# Patient Record
Sex: FEMALE | Race: WHITE | Employment: FULL TIME | URBAN - METROPOLITAN AREA
[De-identification: names, ages, dates, MRNs, and addresses within clinical notes are randomized per-mention and may not be internally consistent; named-entity substitution may affect disease eponyms.]

---

## 2017-01-17 ENCOUNTER — MYC MEDICAL ADVICE (OUTPATIENT)
Dept: FAMILY MEDICINE | Facility: CLINIC | Age: 47
End: 2017-01-17

## 2017-01-17 RX ORDER — ETHYNODIOL DIACETATE AND ETHINYL ESTRADIOL 1 MG-35MCG
1 KIT ORAL DAILY
Qty: 90 TABLET | Refills: 2 | Status: SHIPPED | OUTPATIENT
Start: 2017-01-17 | End: 2017-02-02

## 2017-01-17 NOTE — TELEPHONE ENCOUNTER
Zovia 1-35 E      Last Written Prescription Date:  11/27/2015  Last Fill Quantity: 90,   # refills: 3  Last Office Visit with [facility], [facility] or [facility] prescribing provider: 9/30/2016  Future Office visit:       Routing refill request to provider for review/approval because:  Drug not on the [facility], [facility] or [facility] refill protocol or controlled substance

## 2017-01-17 NOTE — Clinical Note
[address] [id number] [phone]      January 25, 2017      Coty Myles  [address] [facility] [address]              Dear Coty,    In order to ensure we are providing the best quality care, we have reviewed your chart and see that you are due for:    1 Physical with Pap  2 Diabetes Follow-up  3 Fasting cholesterol check    Please call the clinic at your earliest convenience to schedule an appointment.    Thank you for trusting us with your health care.    Sincerely,    Dr. Graciela Gates/jorge luis

## 2017-01-17 NOTE — TELEPHONE ENCOUNTER
Panel Management Review      Patient has the following on her problem list:     Diabetes    ASA:  Not on file    Last A1C  A1C     10.3   8/19/2016  A1C     10.3   4/27/2016  A1C      9.5   11/27/2015  A1C      9.4   8/28/2015  A1C      9.7   4/24/2015  A1C tested: Failed    Last LDL:    CHOL      136   11/27/2015  HDL       52   11/27/2015  LDL       44   11/27/2015  LDL       45   4/29/2014  TRIG      199   11/27/2015  CHOLHDLRATIO      2.1   8/28/2015  NHDL       84   11/27/2015    Is the patient on a Statin? YES             Is the patient on Aspirin? NO    Medications     HMG CoA Reductase Inhibitors    lovastatin (MEVACOR) 40 MG tablet          Last three blood pressure readings:  BP Readings from Last 3 Encounters:   09/30/16 146/76   08/19/16 120/78   04/27/16 128/80       Date of last diabetes office visit: 8/19/16     Tobacco History:     History   Smoking status     Never Smoker    Smokeless tobacco     Never Used             Composite cancer screening  Chart review shows that this patient is due/due soon for the following Pap Smear  Summary:    Patient is due/failing the following:   A1C, PAP and PHYSICAL    Action needed:   Patient needs office visit for Physical with Pap and Diabetes F/u.    Type of outreach:    Sent myQaa message.    Questions for provider review:    None                                                                                                                                      Nichelle Low MA       Chart routed to Care Team .

## 2017-01-23 DIAGNOSIS — E78.5 HYPERLIPIDEMIA LDL GOAL <100: Primary | ICD-10-CM

## 2017-01-23 RX ORDER — LOVASTATIN 40 MG
40 TABLET ORAL AT BEDTIME
Qty: 30 TABLET | Refills: 0 | Status: SHIPPED | OUTPATIENT
Start: 2017-01-23 | End: 2017-02-23

## 2017-01-23 NOTE — TELEPHONE ENCOUNTER
lovastatin (MEVACOR) 40 MG tablet     Last Written Prescription Date: 10-  Last Fill Quantity: 90, # refills: 0  Last Office Visit with [facility], [facility] or [facility] prescribing provider: 9-       CHOL      136   11/27/2015  HDL       52   11/27/2015  LDL       44   11/27/2015  LDL       45   4/29/2014  TRIG      199   11/27/2015  CHOLHDLRATIO      2.1   8/28/2015

## 2017-01-23 NOTE — TELEPHONE ENCOUNTER
Medication is being filled for 1 time refill only due to:  Patient needs to be seen because she is due for fasting labs and a diabetes check..     Raquel Doyle RN   January 23, 2017 11:23 AM  [facility] Triage   [phone]

## 2017-01-27 DIAGNOSIS — E11.9 TYPE 2 DIABETES MELLITUS (H): Primary | ICD-10-CM

## 2017-01-27 RX ORDER — INSULIN LISPRO 100 [IU]/ML
INJECTION, SUSPENSION SUBCUTANEOUS
Qty: 15 ML | Refills: 1 | Status: SHIPPED | OUTPATIENT
Start: 2017-01-27 | End: 2017-03-18

## 2017-01-27 NOTE — TELEPHONE ENCOUNTER
insulin pen needle (BD FRANCOISE U/F) 32G X 4 MM      Last Written Prescription Date: 12-  Last Fill Quantity: 100,  # refills: prn   Last Office Visit with [facility], [facility] or  Health prescribing provider: 9-                                                 insulin lispro prot & lispro (HUMALOG MIX 75/25 KWIKPEN) 100 UNITS/ML susp         Last Written Prescription Date: 8-  Last Fill Quantity: 3 mos, # refills: 1  Last Office Visit with [facility], [facility] or  Health prescribing provider:  9-        BP Readings from Last 3 Encounters:   09/30/16 146/76   08/19/16 120/78   04/27/16 128/80     MICROL        6   1/24/2014  No results found for this basename: microalbumin  CREATININE   Date Value Ref Range Status   08/19/2016 0.86 0.52 - 1.04 mg/dL Final   ]  GFR ESTIMATE   Date Value Ref Range Status   08/19/2016 70 >60 mL/min/1.7m2 Final     Comment:     Non  GFR Calc   11/27/2015 77 >60 mL/min/1.7m2 Final     Comment:     Non  GFR Calc   04/24/2015 77 >60 mL/min/1.7m2 Final     Comment:     Non  GFR Calc     GFR ESTIMATE IF BLACK   Date Value Ref Range Status   08/19/2016 85 >60 mL/min/1.7m2 Final     Comment:      GFR Calc   11/27/2015 >90   GFR Calc   >60 mL/min/1.7m2 Final   04/24/2015 >90   GFR Calc   >60 mL/min/1.7m2 Final     CHOL      136   11/27/2015  HDL       52   11/27/2015  LDL       44   11/27/2015  LDL       45   4/29/2014  TRIG      199   11/27/2015  CHOLHDLRATIO      2.1   8/28/2015  AST       34   1/31/2013  ALT       33   1/31/2013  A1C     10.3   8/19/2016  A1C     10.3   4/27/2016  A1C      9.5   11/27/2015  A1C      9.4   8/28/2015  A1C      9.7   4/24/2015  POTASSIUM   Date Value Ref Range Status   08/19/2016 4.1 3.4 - 5.3 mmol/L Final

## 2017-02-02 ENCOUNTER — OFFICE VISIT (OUTPATIENT)
Dept: INTERNAL MEDICINE | Facility: CLINIC | Age: 47
End: 2017-02-02
Payer: COMMERCIAL

## 2017-02-02 VITALS
OXYGEN SATURATION: 98 % | TEMPERATURE: 96.6 F | DIASTOLIC BLOOD PRESSURE: 84 MMHG | BODY MASS INDEX: 40.23 KG/M2 | SYSTOLIC BLOOD PRESSURE: 133 MMHG | HEART RATE: 72 BPM | WEIGHT: 253 LBS

## 2017-02-02 DIAGNOSIS — J45.21 INTERMITTENT ASTHMA, WITH ACUTE EXACERBATION: Primary | Chronic | ICD-10-CM

## 2017-02-02 DIAGNOSIS — R05.9 COUGH: ICD-10-CM

## 2017-02-02 DIAGNOSIS — E66.01 MORBID OBESITY, UNSPECIFIED OBESITY TYPE (H): Chronic | ICD-10-CM

## 2017-02-02 DIAGNOSIS — J22 LOWER RESPIRATORY INFECTION: ICD-10-CM

## 2017-02-02 PROCEDURE — 99214 OFFICE O/P EST MOD 30 MIN: CPT | Performed by: NURSE PRACTITIONER

## 2017-02-02 RX ORDER — AZITHROMYCIN 250 MG/1
TABLET, FILM COATED ORAL
Qty: 6 TABLET | Refills: 0 | Status: SHIPPED
Start: 2017-02-02 | End: 2017-02-27

## 2017-02-02 RX ORDER — CODEINE PHOSPHATE AND GUAIFENESIN 10; 100 MG/5ML; MG/5ML
1 SOLUTION ORAL EVERY 4 HOURS PRN
Qty: 120 ML | Refills: 0 | Status: SHIPPED | OUTPATIENT
Start: 2017-02-02 | End: 2017-02-27

## 2017-02-02 RX ORDER — GUAIFENESIN 600 MG/1
600 TABLET, EXTENDED RELEASE ORAL 2 TIMES DAILY PRN
Qty: 20 TABLET | Refills: 0 | Status: SHIPPED
Start: 2017-02-02 | End: 2017-02-27

## 2017-02-02 NOTE — MR AVS SNAPSHOT
After Visit Summary   2/2/2017    Coty Myles    MRN: [id number]           Patient Information     Date Of Birth          1970        Visit Information        Provider Department      2/2/2017 1:40 PM Anamaria Thomas APRN CNP [facility]        Today's Diagnoses     Intermittent asthma, with acute exacerbation    -  1     Morbid obesity, unspecified obesity type (H)         Uncontrolled type 2 diabetes mellitus with complication, with long-term current use of insulin (H)         Cough           Care Instructions    Start taking 300 mg Invokana daily. You can use up the rest of your current supply by taking 3, 100 mg tablets daily  Follow up diabetes in months          Follow-ups after your visit        Who to contact     If you have questions or need follow up information about today's clinic visit or your schedule please contact [facility] directly at [phone].  Normal or non-critical lab and imaging results will be communicated to you by OrangeScapehart, letter or phone within 4 business days after the clinic has received the results. If you do not hear from us within 7 days, please contact the clinic through OrangeScapehart or phone. If you have a critical or abnormal lab result, we will notify you by phone as soon as possible.  Submit refill requests through PaintZen or call your pharmacy and they will forward the refill request to us. Please allow 3 business days for your refill to be completed.          Additional Information About Your Visit        MyChart Information     [employer]Zen gives you secure access to your electronic health record. If you see a primary care provider, you can also send messages to your care team and make appointments. If you have questions, please call your primary care clinic.  If you do not have a primary care provider, please call [phone] and they will assist you.        Care EveryWhere ID     This is your Care EveryWhere ID. This  could be used by other organizations to access your [facility] medical records  [id number]        Your Vitals Were     Pulse Temperature Pulse Oximetry Last Period          72 96.6  F (35.9  C) (Oral) 98% 06/30/2016         Blood Pressure from Last 3 Encounters:   02/02/17 133/84   09/30/16 146/76   08/19/16 120/78    Weight from Last 3 Encounters:   02/02/17 253 lb (114.76 kg)   09/30/16 258 lb 8 oz (117.255 kg)   08/19/16 253 lb (114.76 kg)              Today, you had the following     No orders found for display         Today's Medication Changes          These changes are accurate as of: 2/2/17  2:09 PM.  If you have any questions, ask your nurse or doctor.               Start taking these medicines.        Dose/Directions    azithromycin 250 MG tablet   Commonly known as:  ZITHROMAX   Used for:  Intermittent asthma, with acute exacerbation   Started by:  Anamaria Thomas APRN CNP        Two tablets first day, then one tablet daily for four days.   Quantity:  6 tablet   Refills:  0       guaiFENesin 600 MG 12 hr tablet   Commonly known as:  MUCINEX   Used for:  Intermittent asthma, with acute exacerbation   Started by:  Anamaria Thomas APRN CNP        Dose:  600 mg   Take 1 tablet (600 mg) by mouth 2 times daily as needed for congestion   Quantity:  20 tablet   Refills:  0       guaiFENesin-codeine 100-10 MG/5ML Soln solution   Commonly known as:  ROBITUSSIN AC   Used for:  Cough   Started by:  Anamaria Thomas APRN CNP        Dose:  1 tsp.   Take 5 mLs by mouth every 4 hours as needed for cough   Quantity:  120 mL   Refills:  0         These medicines have changed or have updated prescriptions.        Dose/Directions    * INVOKANA 100 MG tablet   This may have changed:  Another medication with the same name was added. Make sure you understand how and when to take each.   Generic drug:  canagliflozin   Changed by:  Graciela Gates DO        Dose:  100 mg   Take 100 mg by mouth   Refills:  3       *  canagliflozin 300 MG tablet   Commonly known as:  INVOKANA   This may have changed:  You were already taking a medication with the same name, and this prescription was added. Make sure you understand how and when to take each.   Used for:  Uncontrolled type 2 diabetes mellitus with complication, with long-term current use of insulin (H)   Changed by:  Anamaria Thomas APRN CNP        Dose:  300 mg   Take 1 tablet (300 mg) by mouth every morning (before breakfast)   Quantity:  90 tablet   Refills:  0       * Notice:  This list has 2 medication(s) that are the same as other medications prescribed for you. Read the directions carefully, and ask your doctor or other care provider to review them with you.         Where to get your medicines      These medications were sent to [id number] - [place], [place] - [address] [place] [address]  [address] [facility] [address]     Phone:  [phone]    - azithromycin 250 MG tablet  - canagliflozin 300 MG tablet  - guaiFENesin 600 MG 12 hr tablet      Some of these will need a paper prescription and others can be bought over the counter.  Ask your nurse if you have questions.     Bring a paper prescription for each of these medications    - guaiFENesin-codeine 100-10 MG/5ML Soln solution             Primary Care Provider Office Phone # Fax #    Graciela Ceja Kody,  [phone]       [address] [address]        Thank you!     Thank you for choosing [facility]  for your care. Our goal is always to provide you with excellent care. Hearing back from our patients is one way we can continue to improve our services. Please take a few minutes to complete the written survey that you may receive in the mail after your visit with us. Thank you!             Your Updated Medication List - Protect others around you: Learn how to safely use, store and throw away your medicines at  www.disposemymeds.org.          This list is accurate as of: 2/2/17  2:09 PM.  Always use your most recent med list.                   Brand Name Dispense Instructions for use    albuterol 108 (90 BASE) MCG/ACT Inhaler    albuterol    1 Inhaler    Inhale 2 puffs into the lungs every 6 hours       azithromycin 250 MG tablet    ZITHROMAX    6 tablet    Two tablets first day, then one tablet daily for four days.       blood glucose monitoring lancets     1 Box    Testing twice daily.       blood glucose monitoring test strip    no brand specified    1 Month    2-3 times daily testing       cyclobenzaprine 10 MG tablet    FLEXERIL    30 tablet    Take 0.5-1 tablets (5-10 mg) by mouth 3 times daily as needed for muscle spasms       fluticasone 110 MCG/ACT Inhaler    FLOVENT HFA    3 Inhaler    Inhale 2 puffs into the lungs 2 times daily       guaiFENesin 600 MG 12 hr tablet    MUCINEX    20 tablet    Take 1 tablet (600 mg) by mouth 2 times daily as needed for congestion       guaiFENesin-codeine 100-10 MG/5ML Soln solution    ROBITUSSIN AC    120 mL    Take 5 mLs by mouth every 4 hours as needed for cough       HYDROcodone-acetaminophen 5-325 MG per tablet    NORCO    40 tablet    Take 1 tablet by mouth daily as needed for pain       insulin lispro prot & lispro injection    HumaLOG MIX 75/25 KWIKPEN    15 mL    Use twice daily. 48 units.       insulin pen needle 32G X 4 MM    BD FRANCOISE U/F    100 each    Use twice daily.       * INVOKANA 100 MG tablet   Generic drug:  canagliflozin      Take 100 mg by mouth       * canagliflozin 300 MG tablet    INVOKANA    90 tablet    Take 1 tablet (300 mg) by mouth every morning (before breakfast)       levothyroxine 88 MCG tablet    SYNTHROID    90 tablet    Take 1 tablet (88 mcg) by mouth daily       LEXAPRO PO      Take 40 mg by mouth daily.       lisinopril-hydrochlorothiazide 10-12.5 MG per tablet    PRINZIDE/ZESTORETIC    180 tablet    Take 2 tablets by mouth daily        LORazepam 0.5 MG tablet    ATIVAN     0.5 mg as needed       lovastatin 40 MG tablet    MEVACOR    30 tablet    Take 1 tablet (40 mg) by mouth At Bedtime **Due for office visit for further refills**       LUNESTA 3 MG tablet   Generic drug:  eszopiclone     0    1 TABLET AT BEDTIME PRN       metFORMIN 500 MG tablet    GLUCOPHAGE    450 tablet    Take 3 tablets in the am and 2 in the pm.       metoprolol 100 MG tablet    LOPRESSOR    180 tablet    Take 1 tablet (100 mg) by mouth 2 times daily       Multi-vitamin Tabs tablet   Generic drug:  multivitamin, therapeutic with minerals     0    1 TAB PO QD       naproxen sodium 275 MG tablet    ANAPROX    60 tablet    Take 1 tablet (275 mg) by mouth 2 times daily as needed       olopatadine 0.1 % ophthalmic solution    PATANOL    5 mL    Place 1 drop into both eyes 2 times daily       UNKNOWN MED DOSAGE     0    FOLIC ACID- 1 TABLET DAILY       vitamin D 1000 UNITS capsule      1 CAPSULE DAILY       * Notice:  This list has 2 medication(s) that are the same as other medications prescribed for you. Read the directions carefully, and ask your doctor or other care provider to review them with you.

## 2017-02-02 NOTE — PATIENT INSTRUCTIONS
Start taking 300 mg Invokana daily. You can use up the rest of your current supply by taking 3, 100 mg tablets daily  Follow up diabetes in months

## 2017-02-02 NOTE — PROGRESS NOTES
SUBJECTIVE:                                                    Coty Myles is a 46 year old female who presents to clinic today for the following health issues:      ENT Symptoms             Symptoms: cc Present Absent Comment   Fever/Chills  x  Fever- but hasnt checked    Fatigue  x     Muscle Aches  x     Eye Irritation  x  More watery    Sneezing   x    Nasal Luciano/Drg  x     Sinus Pressure/Pain  x  More chest congestion then sinus    Loss of smell   x    Dental pain  x  A week before this had happened    Sore Throat  x  Not severe    Swollen Glands   x    Ear Pain/Fullness  x  Left- more itchy then usual    Cough  x  Not sleeping at night because of cough    Wheeze  x     Chest Pain  x  When she coughs    Shortness of breath  x  More frequent then usual- going up the stairs   Rash   x    Other  x  Propping head up at night to sleep     Symptom duration:  1 week    Symptom severity:  moderate    Treatments tried:  Nyquil calms it down enough to sleep   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  Additional history: none    Patient Active Problem List   Diagnosis     Attention deficit disorder     Sciatica     Hypothyroidism     iamLUMBOSACRAL NEURITIS NOS     Hyperlipidemia LDL goal <100     Hypertension goal BP (blood pressure) < 130/80     Moderate major depression (H)     Family history of ischemic heart disease     Intermittent asthma     ASCUS with positive high risk HPV     Morbid obesity (H)     Headache     Uncontrolled type 2 diabetes mellitus with  complication (H)     Past Surgical History   Procedure Laterality Date     Surgical history of -   1990     right knee arthroscopic     Surgical history of -   2000     lump on neck removed     Ent surgery       Orthopedic surgery         Social History   Substance Use Topics     Smoking status: Never Smoker      Smokeless tobacco: Never Used     Alcohol Use: Yes      Comment: 3 to 4 per year     Family History   Problem Relation Age of Onset     HEART DISEASE Father      heart attack at 64     DIABETES Father      DIABETES Paternal Grandmother      DIABETES Mother      CANCER Paternal Grandfather      Lung CA     DIABETES Paternal Aunt      DIABETES Paternal Uncle      Alcohol/Drug Paternal Uncle      Gynecology Mother      hysterectomy- ovarian cyst     Respiratory Mother      COPD- dependent on oxygen, passed away at age 69     DIABETES Maternal Grandmother      DIABETES Brother      Allergies Mother      to percocet     CEREBROVASCULAR DISEASE No family hx of      C.A.D. Father          Current Outpatient Prescriptions   Medication Sig Dispense Refill     azithromycin (ZITHROMAX) 250 MG tablet Two tablets first day, then one tablet daily for four days. 6 tablet 0     guaiFENesin-codeine (ROBITUSSIN AC) 100-10 MG/5ML SOLN solution Take 5 mLs by mouth every 4 hours as needed for cough 120 mL 0     guaiFENesin (MUCINEX) 600 MG 12 hr tablet Take 1 tablet (600 mg) by mouth 2 times daily as needed for congestion 20 tablet 0     canagliflozin (INVOKANA) 300 MG tablet Take 1 tablet (300 mg) by mouth every morning (before breakfast) 90 tablet 0     insulin pen needle (BD FRANCOISE U/F) 32G X 4 MM Use twice daily. 100 each prn     insulin lispro prot & lispro (HUMALOG MIX 75/25 KWIKPEN) injection Use twice daily. 48 units. 15 mL 1     lovastatin (MEVACOR) 40 MG tablet Take 1 tablet (40 mg) by mouth At Bedtime **Due for office visit for further refills** 30 tablet 0     blood glucose monitoring (NO BRAND SPECIFIED) test strip  2-3 times daily testing 1 Month 12     metoprolol (LOPRESSOR) 100 MG tablet Take 1 tablet (100 mg) by mouth 2 times daily 180 tablet 0     INVOKANA 100 MG tablet Take 100 mg by mouth  3     LORazepam (ATIVAN) 0.5 MG tablet 0.5 mg as needed  2     metFORMIN (GLUCOPHAGE) 500 MG tablet Take 3 tablets in the am and 2 in the pm. 450 tablet 1     olopatadine (PATANOL) 0.1 % ophthalmic solution Place 1 drop into both eyes 2 times daily 5 mL 3     HYDROcodone-acetaminophen (NORCO) 5-325 MG per tablet Take 1 tablet by mouth daily as needed for pain 40 tablet 0     lisinopril-hydrochlorothiazide (PRINZIDE,ZESTORETIC) 10-12.5 MG per tablet Take 2 tablets by mouth daily 180 tablet 3     levothyroxine (SYNTHROID) 88 MCG tablet Take 1 tablet (88 mcg) by mouth daily 90 tablet 1     blood glucose monitoring (ACCU-CHEK MULTICLIX) lancets Testing twice daily. 1 Box 12     fluticasone (FLOVENT HFA) 110 MCG/ACT inhaler Inhale 2 puffs into the lungs 2 times daily 3 Inhaler 1     albuterol (ALBUTEROL) 108 (90 BASE) MCG/ACT inhaler Inhale 2 puffs into the lungs every 6 hours 1 Inhaler 0     naproxen sodium (ANAPROX) 275 MG tablet Take 1 tablet (275 mg) by mouth 2 times daily as needed 60 tablet 1     cyclobenzaprine (FLEXERIL) 10 MG tablet Take 0.5-1 tablets (5-10 mg) by mouth 3 times daily as needed for muscle spasms 30 tablet 1     Escitalopram Oxalate (LEXAPRO PO) Take 40 mg by mouth daily.       VITAMIN D 1000 UNIT PO CAPS 1 CAPSULE DAILY       LUNESTA 3 MG OR TABS 1 TABLET AT BEDTIME PRN 0 0     UNKNOWN MED DOSAGE FOLIC ACID- 1 TABLET DAILY 0 0     MULTI-VITAMIN OR TABS 1 TAB PO QD 0 0     Recent Labs   Lab Test  08/19/16   0829  04/27/16   1634  11/27/15   1037  08/28/15   0859   04/29/14   1350   05/09/13   1111  01/31/13   1812   05/15/12   0809   08/09/11   0939   A1C  10.3*  10.3*  9.5*  9.4*   < >  8.0*   < >  9.3*   --    < >  11.3*   < >   --    LDL   --    --   44  28   --   45   --   43   --    --   84   --   63   HDL   --     --   52  53   --    --    --   48*   --    --   49*   --   48*   TRIG   --    --   199*  159*   --    --    --   210*   --    --   241*   --   193*   ALT   --    --    --    --    --    --    --    --   33   --   18   --   19   CR  0.86   --   0.80   --    < >  0.89   < >   --   0.85   < >  0.80   < >   --    GFRESTIMATED  70   --   77   --    < >  68   < >   --   73   < >  78   < >   --    GFRESTBLACK  85   --   >90   GFR Calc     --    < >  83   < >   --   88   < >  >90   < >   --    POTASSIUM  4.1   --   4.4   --    < >  3.7   < >   --   4.5   < >  4.3   < >   --    TSH   --    --   3.30  4.76*   < >  1.35   --   2.07   --    --   3.81   --    --     < > = values in this interval not displayed.      BP Readings from Last 3 Encounters:   02/02/17 133/84   09/30/16 146/76   08/19/16 120/78    Wt Readings from Last 3 Encounters:   02/02/17 253 lb (114.76 kg)   09/30/16 258 lb 8 oz (117.255 kg)   08/19/16 253 lb (114.76 kg)                  Problem list, Medication list, Allergies, and Medical/Social/Surgical histories reviewed in [facility] and updated as appropriate.    ROS:  Constitutional, HEENT, cardiovascular, pulmonary, gi and gu systems are negative, except as otherwise noted.    OBJECTIVE:                                                    /84 mmHg  Pulse 72  Temp(Src) 96.6  F (35.9  C) (Oral)  Wt 253 lb (114.76 kg)  SpO2 98%  LMP 06/30/2016  Body mass index is 40.23 kg/(m^2).  GENERAL: healthy, alert and no distress  HENT: ear canals and TM's normal, nose and mouth without ulcers or lesions  NECK: no adenopathy  RESP: scattered wheezing, rhonchi, no labored breathing  CV: regular rate and rhythm, normal S1 S2, no S3 or S4, no murmur, click or rub    Diagnostic Test Results:  none      ASSESSMENT/PLAN:                                                        ICD-10-CM    1. Intermittent asthma, with acute exacerbation J45.21 azithromycin (ZITHROMAX) 250 MG tablet     guaiFENesin  (MUCINEX) 600 MG 12 hr tablet   2. Morbid obesity, unspecified obesity type (H) E66.01    3. Uncontrolled type 2 diabetes mellitus with complication, with long-term current use of insulin (H) E11.8 canagliflozin (INVOKANA) 300 MG tablet    E11.65     Z79.4    4. Cough R05 guaiFENesin-codeine (ROBITUSSIN AC) 100-10 MG/5ML SOLN solution   5. Lower respiratory infection J22      Recommend Abx treatment for respiratory infection > 10 weeks during in patient with history of asthma  Due to poorly controlled diabetes, can not do steorid burst  Increase Invokana  Follow up in 3 months  Patient requests refill of Norco at the end of visit. I advised her to get this from her primary care provider. I do see she is getting serial Norco scripts for headache. I recommended to her that she establish a pain contract with one provider.  Patient do for several health maintenance items. She will call to schedule   Patient Instructions   Start taking 300 mg Invokana daily. You can use up the rest of your current supply by taking 3, 100 mg tablets daily  Follow up diabetes in months          CLOVER Gandhi [facility]

## 2017-02-08 DIAGNOSIS — H10.13 CONJUNCTIVITIS, ALLERGIC, BILATERAL: Primary | ICD-10-CM

## 2017-02-08 NOTE — TELEPHONE ENCOUNTER
Routing refill request to provider for review/approval because:  There is no corresponding dx.  Jennifer Pike RN

## 2017-02-08 NOTE — TELEPHONE ENCOUNTER
Olopatadine 0.1% eye drops      Last Written Prescription Date: 9/30/2016  Last Fill Quantity: 5 ml,  # refills: 3   Last Office Visit with [facility], [facility] or [facility] prescribing provider: 9/30/2016                                         Next 5 appointments (look out 90 days)     Feb 27, 2017  8:00 AM   [facility] with Graciela Gates DO   [facility] ([facility])    [address] [place] [address] [id number] [phone]

## 2017-02-14 DIAGNOSIS — I10 HYPERTENSION GOAL BP (BLOOD PRESSURE) < 130/80: Chronic | ICD-10-CM

## 2017-02-14 DIAGNOSIS — E11.9 TYPE 2 DIABETES MELLITUS (H): Primary | ICD-10-CM

## 2017-02-14 DIAGNOSIS — E03.9 HYPOTHYROIDISM: ICD-10-CM

## 2017-02-14 RX ORDER — METOPROLOL TARTRATE 100 MG
100 TABLET ORAL 2 TIMES DAILY
Qty: 180 TABLET | Refills: 1 | Status: SHIPPED | OUTPATIENT
Start: 2017-02-14 | End: 2017-07-21

## 2017-02-14 RX ORDER — LEVOTHYROXINE SODIUM 88 UG/1
88 TABLET ORAL DAILY
Qty: 30 TABLET | Refills: 0 | Status: SHIPPED | OUTPATIENT
Start: 2017-02-14 | End: 2017-03-24

## 2017-02-14 NOTE — TELEPHONE ENCOUNTER
Due for labs, sent x1 with note, ordered HM, other pescription approved per [facility] Refill Protocol.  Delia Begum RN

## 2017-02-14 NOTE — TELEPHONE ENCOUNTER
levothyroxine (SYNTHROID) 88 MCG tablet     Last Written Prescription Date: 8-  Last Quantity: 90, # refills: 1  Last Office Visit with [facility], [facility] or  Health prescribing provider: 2-2-2017   Next 5 appointments (look out 90 days)     Feb 27, 2017  8:00 AM CST   [facility] with Hellina Tegagne Kody, DO   [facility]    [address] [place] [id number]   [phone]                   TSH   Date Value Ref Range Status   11/27/2015 3.30 0.40 - 4.00 mU/L Final       Metoprolol Tart 100mg tab      Last Written Prescription Date: 11-  Last Fill Quantity: 180, # refills: 0    Last Office Visit with [facility], [facility] or  Health prescribing provider:  2-2-2017   Future Office Visit:    Next 5 appointments (look out 90 days)     Feb 27, 2017  8:00 AM CST   [facility] with Hellina Tegagne Kody, DO   [facility]    [address] [place] [id number]   [phone]                    BP Readings from Last 3 Encounters:   02/02/17 133/84   09/30/16 146/76   08/19/16 120/78

## 2017-02-15 RX ORDER — OLOPATADINE HYDROCHLORIDE 1 MG/ML
1 SOLUTION/ DROPS OPHTHALMIC 2 TIMES DAILY
Qty: 5 ML | Refills: 3 | Status: SHIPPED | OUTPATIENT
Start: 2017-02-15 | End: 2017-08-01

## 2017-02-22 DIAGNOSIS — E78.5 HYPERLIPIDEMIA LDL GOAL <100: ICD-10-CM

## 2017-02-22 RX ORDER — LOVASTATIN 40 MG
40 TABLET ORAL AT BEDTIME
Qty: 30 TABLET | Refills: 0 | Status: CANCELLED | OUTPATIENT
Start: 2017-02-22

## 2017-02-22 NOTE — TELEPHONE ENCOUNTER
lovastatin (MEVACOR) 40 MG tablet     Last Written Prescription Date: 1/23/2017  Last Fill Quantity: 30, # refills: 0  Last Office Visit with [facility], [facility] or [facility] prescribing provider: 9/30/2016  Next 5 appointments (look out 90 days)     Feb 27, 2017  8:00 AM SADE [place] [facility] with Graciela Gates,    [facility]    [address] [place] [id number] [phone]                   Lab Results   Component Value Date    CHOL 136 11/27/2015     Lab Results   Component Value Date    HDL 52 11/27/2015     Lab Results   Component Value Date    LDL 44 11/27/2015    LDL 45 04/29/2014     Lab Results   Component Value Date    TRIG 199 11/27/2015     Lab Results   Component Value Date    CHOLHDLRATIO 2.1 08/28/2015

## 2017-02-23 RX ORDER — LOVASTATIN 40 MG
40 TABLET ORAL AT BEDTIME
Qty: 30 TABLET | Refills: 0 | Status: SHIPPED | OUTPATIENT
Start: 2017-02-23 | End: 2017-03-18

## 2017-02-23 NOTE — TELEPHONE ENCOUNTER
Medication is being filled for 1 time refill only due to:  upcomming appointment     Raquel Doyle RN   February 23, 2017 12:20 PM  [facility] Triage   [phone]

## 2017-02-27 ENCOUNTER — OFFICE VISIT (OUTPATIENT)
Dept: FAMILY MEDICINE | Facility: CLINIC | Age: 47
End: 2017-02-27
Payer: COMMERCIAL

## 2017-02-27 ENCOUNTER — TELEPHONE (OUTPATIENT)
Dept: FAMILY MEDICINE | Facility: CLINIC | Age: 47
End: 2017-02-27

## 2017-02-27 VITALS
HEART RATE: 74 BPM | SYSTOLIC BLOOD PRESSURE: 128 MMHG | WEIGHT: 251 LBS | DIASTOLIC BLOOD PRESSURE: 74 MMHG | HEIGHT: 67 IN | TEMPERATURE: 98.1 F | BODY MASS INDEX: 39.39 KG/M2

## 2017-02-27 DIAGNOSIS — Z11.3 SCREEN FOR STD (SEXUALLY TRANSMITTED DISEASE): ICD-10-CM

## 2017-02-27 DIAGNOSIS — Z13.5 SCREENING FOR DIABETIC RETINOPATHY: ICD-10-CM

## 2017-02-27 DIAGNOSIS — R87.610 PAPANICOLAOU SMEAR OF CERVIX WITH ATYPICAL SQUAMOUS CELLS OF UNDETERMINED SIGNIFICANCE (ASC-US): ICD-10-CM

## 2017-02-27 DIAGNOSIS — G89.29 CHRONIC NONINTRACTABLE HEADACHE, UNSPECIFIED HEADACHE TYPE: Chronic | ICD-10-CM

## 2017-02-27 DIAGNOSIS — R51.9 CHRONIC NONINTRACTABLE HEADACHE, UNSPECIFIED HEADACHE TYPE: Chronic | ICD-10-CM

## 2017-02-27 DIAGNOSIS — Z00.01 ENCOUNTER FOR ROUTINE ADULT HEALTH EXAMINATION WITH ABNORMAL FINDINGS: Primary | ICD-10-CM

## 2017-02-27 DIAGNOSIS — E03.9 HYPOTHYROIDISM, UNSPECIFIED TYPE: Chronic | ICD-10-CM

## 2017-02-27 DIAGNOSIS — Z13.89 SCREENING FOR DIABETIC PERIPHERAL NEUROPATHY: ICD-10-CM

## 2017-02-27 DIAGNOSIS — Z12.4 SCREENING FOR MALIGNANT NEOPLASM OF CERVIX: ICD-10-CM

## 2017-02-27 DIAGNOSIS — N84.1 CERVICAL POLYP: ICD-10-CM

## 2017-02-27 DIAGNOSIS — F33.1 MAJOR DEPRESSIVE DISORDER, RECURRENT EPISODE, MODERATE (H): Chronic | ICD-10-CM

## 2017-02-27 DIAGNOSIS — E78.5 HYPERLIPIDEMIA LDL GOAL <100: ICD-10-CM

## 2017-02-27 DIAGNOSIS — M54.30 SCIATICA, UNSPECIFIED LATERALITY: Chronic | ICD-10-CM

## 2017-02-27 DIAGNOSIS — J45.21 INTERMITTENT ASTHMA, WITH ACUTE EXACERBATION: Chronic | ICD-10-CM

## 2017-02-27 LAB — HBA1C MFR BLD: 9.8 % (ref 4.3–6)

## 2017-02-27 PROCEDURE — 87491 CHLMYD TRACH DNA AMP PROBE: CPT | Performed by: FAMILY MEDICINE

## 2017-02-27 PROCEDURE — 99207 C FOOT EXAM  NO CHARGE: CPT | Mod: 25 | Performed by: FAMILY MEDICINE

## 2017-02-27 PROCEDURE — 87591 N.GONORRHOEAE DNA AMP PROB: CPT | Performed by: FAMILY MEDICINE

## 2017-02-27 PROCEDURE — 36415 COLL VENOUS BLD VENIPUNCTURE: CPT | Performed by: FAMILY MEDICINE

## 2017-02-27 PROCEDURE — 84443 ASSAY THYROID STIM HORMONE: CPT | Performed by: FAMILY MEDICINE

## 2017-02-27 PROCEDURE — 87624 HPV HI-RISK TYP POOLED RSLT: CPT | Performed by: FAMILY MEDICINE

## 2017-02-27 PROCEDURE — 80048 BASIC METABOLIC PNL TOTAL CA: CPT | Performed by: FAMILY MEDICINE

## 2017-02-27 PROCEDURE — 99396 PREV VISIT EST AGE 40-64: CPT | Performed by: FAMILY MEDICINE

## 2017-02-27 PROCEDURE — 80061 LIPID PANEL: CPT | Performed by: FAMILY MEDICINE

## 2017-02-27 PROCEDURE — 83036 HEMOGLOBIN GLYCOSYLATED A1C: CPT | Performed by: FAMILY MEDICINE

## 2017-02-27 PROCEDURE — 88175 CYTOPATH C/V AUTO FLUID REDO: CPT | Performed by: FAMILY MEDICINE

## 2017-02-27 RX ORDER — HYDROCODONE BITARTRATE AND ACETAMINOPHEN 5; 325 MG/1; MG/1
1 TABLET ORAL DAILY PRN
Qty: 40 TABLET | Refills: 0 | Status: SHIPPED | OUTPATIENT
Start: 2017-02-27 | End: 2017-07-21

## 2017-02-27 ASSESSMENT — ANXIETY QUESTIONNAIRES
2. NOT BEING ABLE TO STOP OR CONTROL WORRYING: NOT AT ALL
GAD7 TOTAL SCORE: 0
7. FEELING AFRAID AS IF SOMETHING AWFUL MIGHT HAPPEN: NOT AT ALL
3. WORRYING TOO MUCH ABOUT DIFFERENT THINGS: NOT AT ALL
1. FEELING NERVOUS, ANXIOUS, OR ON EDGE: NOT AT ALL
5. BEING SO RESTLESS THAT IT IS HARD TO SIT STILL: NOT AT ALL
6. BECOMING EASILY ANNOYED OR IRRITABLE: NOT AT ALL

## 2017-02-27 ASSESSMENT — PATIENT HEALTH QUESTIONNAIRE - PHQ9: 5. POOR APPETITE OR OVEREATING: NOT AT ALL

## 2017-02-27 NOTE — TELEPHONE ENCOUNTER
Ordered.  Can be closed.    Grace BAUTISTA, Certified Medical Assistant (AAMA)February 27, 2017 1:52 PM

## 2017-02-27 NOTE — PATIENT INSTRUCTIONS
Follow up for eye exam  Follow up with gyn , get pelvic US done    Preventive Health Recommendations  Female Ages 40 to 49    Yearly exam:     See your health care provider every year in order to  1. Review health changes.   2. Discuss preventive care.    3. Review your medicines if your doctor prescribed any.      Get a Pap test every three years (unless you have an abnormal result and your provider advises testing more often).      If you get Pap tests with HPV test, you only need to test every 5 years, unless you have an abnormal result. You do not need a Pap test if your uterus was removed (hysterectomy) and you have not had cancer.      You should be tested each year for STDs (sexually transmitted diseases), if you're at risk.       Ask your doctor if you should have a mammogram.      Have a colonoscopy (test for colon cancer) if someone in your family has had colon cancer or polyps before age 50.       Have a cholesterol test every 5 years.       Have a diabetes test (fasting glucose) after age 45. If you are at risk for diabetes, you should have this test every 3 years.    Shots: Get a flu shot each year. Get a tetanus shot every 10 years.     Nutrition:     Eat at least 5 servings of fruits and vegetables each day.    Eat whole-grain bread, whole-wheat pasta and brown rice instead of white grains and rice.    Talk to your provider about Calcium and Vitamin D.     Lifestyle    Exercise at least 150 minutes a week (an average of 30 minutes a day, 5 days a week). This will help you control your weight and prevent disease.    Limit alcohol to one drink per day.    No smoking.     Wear sunscreen to prevent skin cancer.    See your dentist every six months for an exam and cleaning.

## 2017-02-27 NOTE — MR AVS SNAPSHOT
After Visit Summary   2/27/2017    Coty Myles    MRN: [id number]           Patient Information     Date Of Birth          1970        Visit Information        Provider Department      2/27/2017 8:00 AM Graciela Gates DO [facility]        Today's Diagnoses     Uncontrolled type 2 diabetes mellitus with complication, with long-term current use of insulin (H)    -  1    Screening for malignant neoplasm of cervix        Screening for diabetic retinopathy        Screening for diabetic peripheral neuropathy        Type 2 diabetes mellitus (H)        Hyperlipidemia LDL goal <100        Hypothyroidism, unspecified type        Major depressive disorder, recurrent episode, moderate (H)        Intermittent asthma, with acute exacerbation        Cervical polyp        Chronic nonintractable headache, unspecified headache type        Sciatica, unspecified laterality        Papanicolaou smear of cervix with atypical squamous cells of undetermined significance (ASC-US)          Care Instructions    Follow up for eye exam  Follow up with gyn , get pelvic US done    Preventive Health Recommendations  Female Ages 40 to 49    Yearly exam:     See your health care provider every year in order to  1. Review health changes.   2. Discuss preventive care.    3. Review your medicines if your doctor prescribed any.      Get a Pap test every three years (unless you have an abnormal result and your provider advises testing more often).      If you get Pap tests with HPV test, you only need to test every 5 years, unless you have an abnormal result. You do not need a Pap test if your uterus was removed (hysterectomy) and you have not had cancer.      You should be tested each year for STDs (sexually transmitted diseases), if you're at risk.       Ask your doctor if you should have a mammogram.      Have a colonoscopy (test for colon cancer) if someone in your family has had colon cancer or polyps  before age 50.       Have a cholesterol test every 5 years.       Have a diabetes test (fasting glucose) after age 45. If you are at risk for diabetes, you should have this test every 3 years.    Shots: Get a flu shot each year. Get a tetanus shot every 10 years.     Nutrition:     Eat at least 5 servings of fruits and vegetables each day.    Eat whole-grain bread, whole-wheat pasta and brown rice instead of white grains and rice.    Talk to your provider about Calcium and Vitamin D.     Lifestyle    Exercise at least 150 minutes a week (an average of 30 minutes a day, 5 days a week). This will help you control your weight and prevent disease.    Limit alcohol to one drink per day.    No smoking.     Wear sunscreen to prevent skin cancer.    See your dentist every six months for an exam and cleaning.        Follow-ups after your visit        Who to contact     If you have questions or need follow up information about today's clinic visit or your schedule please contact [facility] directly at [phone].  Normal or non-critical lab and imaging results will be communicated to you by [employer], letter or phone within 4 business days after the clinic has received the results. If you do not hear from us within 7 days, please contact the clinic through [employer] or phone. If you have a critical or abnormal lab result, we will notify you by phone as soon as possible.  Submit refill requests through [employer] or call your pharmacy and they will forward the refill request to us. Please allow 3 business days for your refill to be completed.          Additional Information About Your Visit        [employer] Information     [employer] gives you secure access to your electronic health record. If you see a primary care provider, you can also send messages to your care team and make appointments. If you have questions, please call your primary care clinic.  If you do not have a primary care provider, please call  "[phone] and they will assist you.        Care EveryWhere ID     This is your Care EveryWhere ID. This could be used by other organizations to access your [facility] [employer] medical records  [id number]        Your Vitals Were     Pulse Temperature Height Last Period BMI (Body Mass Index)       74 98.1  F (36.7  C) (Oral) 5' 6.5\" (1.689 m) 06/30/2016 39.91 kg/m2        Blood Pressure from Last 3 Encounters:   02/27/17 128/74   02/02/17 133/84   09/30/16 146/76    Weight from Last 3 Encounters:   02/27/17 251 lb (113.9 kg)   02/02/17 253 lb (114.8 kg)   09/30/16 258 lb 8 oz (117.3 kg)              We Performed the Following     Hemoglobin A1c          Today's Medication Changes          These changes are accurate as of: 2/27/17  9:01 AM.  If you have any questions, ask your nurse or doctor.               Stop taking these medicines if you haven't already. Please contact your care team if you have questions.     azithromycin 250 MG tablet   Commonly known as:  ZITHROMAX   Stopped by:  Graciela Gates,            cyclobenzaprine 10 MG tablet   Commonly known as:  FLEXERIL   Stopped by:  Graciela Gates DO           guaiFENesin 600 MG 12 hr tablet   Commonly known as:  MUCINEX   Stopped by:  Graciela Gates DO           guaiFENesin-codeine 100-10 MG/5ML Soln solution   Commonly known as:  ROBITUSSIN AC   Stopped by:  Graciela Gates DO           naproxen sodium 275 MG tablet   Commonly known as:  ANAPROX   Stopped by:  Graciela Gates DO                    Primary Care Provider Office Phone # Fax #    Graciela Gates [phone]       [address] [address]        Thank you!     Thank you for choosing [facility]  for your care. Our goal is always to provide you with excellent care. Hearing back from our patients is one way we can continue to improve our services. Please take a few " minutes to complete the written survey that you may receive in the mail after your visit with us. Thank you!             Your Updated Medication List - Protect others around you: Learn how to safely use, store and throw away your medicines at www.disposemymeds.org.          This list is accurate as of: 2/27/17  9:01 AM.  Always use your most recent med list.                   Brand Name Dispense Instructions for use    albuterol 108 (90 BASE) MCG/ACT Inhaler    albuterol    1 Inhaler    Inhale 2 puffs into the lungs every 6 hours       blood glucose monitoring lancets     1 Box    Testing twice daily.       blood glucose monitoring test strip    no brand specified    1 Month    2-3 times daily testing       fluticasone 110 MCG/ACT Inhaler    FLOVENT HFA    3 Inhaler    Inhale 2 puffs into the lungs 2 times daily       HYDROcodone-acetaminophen 5-325 MG per tablet    NORCO    40 tablet    Take 1 tablet by mouth daily as needed for pain       insulin lispro prot & lispro injection    HumaLOG MIX 75/25 KWIKPEN    15 mL    Use twice daily. 48 units.       insulin pen needle 32G X 4 MM    BD FRANCOISE U/F    100 each    Use twice daily.       * INVOKANA 100 MG tablet   Generic drug:  canagliflozin      Take 100 mg by mouth       * canagliflozin 300 MG tablet    INVOKANA    90 tablet    Take 1 tablet (300 mg) by mouth every morning (before breakfast)       levothyroxine 88 MCG tablet    SYNTHROID    30 tablet    Take 1 tablet (88 mcg) by mouth daily Due for labs for refills! Please schedule a lab only appt- I will place orders.       LEXAPRO PO      Take 40 mg by mouth daily.       lisinopril-hydrochlorothiazide 10-12.5 MG per tablet    PRINZIDE/ZESTORETIC    180 tablet    Take 2 tablets by mouth daily       LORazepam 0.5 MG tablet    ATIVAN     0.5 mg as needed       lovastatin 40 MG tablet    MEVACOR    30 tablet    Take 1 tablet (40 mg) by mouth At Bedtime **Due for office visit for further refills**       LUNESTA 3 MG  tablet   Generic drug:  eszopiclone     0    1 TABLET AT BEDTIME PRN       metFORMIN 500 MG tablet    GLUCOPHAGE    450 tablet    Take 3 tablets in the am and 2 in the pm.       metoprolol 100 MG tablet    LOPRESSOR    180 tablet    Take 1 tablet (100 mg) by mouth 2 times daily       Multi-vitamin Tabs tablet   Generic drug:  multivitamin, therapeutic with minerals     0    1 TAB PO QD       olopatadine 0.1 % ophthalmic solution    PATANOL    5 mL    Place 1 drop into both eyes 2 times daily       UNKNOWN MED DOSAGE     0    FOLIC ACID- 1 TABLET DAILY       vitamin D 1000 UNITS capsule      1 CAPSULE DAILY       * Notice:  This list has 2 medication(s) that are the same as other medications prescribed for you. Read the directions carefully, and ask your doctor or other care provider to review them with you.

## 2017-02-27 NOTE — TELEPHONE ENCOUNTER
Patient here for appointment today and left without taking the prescription.      I left a message for patient to return call to clinic.  We need to find out how she wants to get the script. I can put at  for her or we can mail to her pharmacy?    Grace BAUTISTA, Certified Medical Assistant (AAMA)February 27, 2017 1:55 PM

## 2017-02-27 NOTE — NURSING NOTE
"Chief Complaint   Patient presents with     Physical     Other     Due for Health MAintenance       Initial /74 (BP Location: Right arm, Patient Position: Chair, Cuff Size: Adult Large)  Pulse 74  Temp 98.1  F (36.7  C) (Oral)  Ht 5' 6.5\" (1.689 m)  Wt 251 lb (113.9 kg)  LMP 06/30/2016  BMI 39.91 kg/m2 Estimated body mass index is 39.91 kg/(m^2) as calculated from the following:    Height as of this encounter: 5' 6.5\" (1.689 m).    Weight as of this encounter: 251 lb (113.9 kg).  Medication Reconciliation: complete   Charo De León MA      "

## 2017-02-27 NOTE — LETTER
[facility]  [address] [place] [id number] [phone]      March 13, 2017      Coty Myles  [address] [facility] [address]              Dear Coty,    Your renal function is slightly abnormal, please recheck in 3 months labs, otherwise normal   Results for orders placed or performed in visit on 02/27/17   BASIC METABOLIC PANEL   Result Value Ref Range    Sodium 139 133 - 144 mmol/L    Potassium 4.1 3.4 - 5.3 mmol/L    Chloride 105 94 - 109 mmol/L    Carbon Dioxide 25 20 - 32 mmol/L    Anion Gap 9 3 - 14 mmol/L    Glucose 153 (H) 70 - 99 mg/dL    Urea Nitrogen 30 7 - 30 mg/dL    Creatinine 1.10 (H) 0.52 - 1.04 mg/dL    GFR Estimate 53 (L) >60 mL/min/1.7m2    GFR Estimate If Black 64 >60 mL/min/1.7m2    Calcium 9.6 8.5 - 10.1 mg/dL   Lipid panel reflex to direct LDL   Result Value Ref Range    Cholesterol 168 <200 mg/dL    Triglycerides 300 (H) <150 mg/dL    HDL Cholesterol 42 (L) >49 mg/dL    LDL Cholesterol Calculated 66 <100 mg/dL    Non HDL Cholesterol 126 <130 mg/dL   TSH   Result Value Ref Range    TSH 3.55 0.40 - 4.00 mU/L   HPV High Risk Types DNA Cervical   Result Value Ref Range    HPV 16 DNA Negative NEG    HPV 18 DNA Negative NEG    Other HR HPV Positive (A) NEG    Final Diagnosis       This patient's sample is positive for other HR HPV DNA (types 31, 33, 35, 39, 45,   51, 52, 56, 58, 59, 66 or 68), not HPV 16 or HPV 18 DNA. This result requires   clinical correlation with concurrent cytology findings.  (Note)  METHODOLOGY:  The Roche festus 4800 system uses automated extraction,  simultaneous amplification of HPV (L1 region) and beta-globin,  followed by  real time detection of fluorescent labeled HPV and beta  globin using specific oligonucleotide probes . The test specifically  identifies types HPV 16 DNA and HPV 18 DNA while concurrently  detecting the rest of the high risk types (31, 33, 35, 39, 45, 51,  52, 56, 58, 59, 66 or 68).    COMMENTS:   This test is not intended for use as a screening device  for women under age 30 with normal cervical cytology.  Results should  be correlated with cytologic and histologic findings. Close clinical  followup is recommended.    This test was developed and its performance characteristics  determined by the [facility]. It has not been cleared or approved by the  FDA. The laboratory is regulated under CLIA as qualified to perform  high-complexity testing. This test is used for clinical purposes. It  should not be regarded as investigational or for research.        Specimen Description Cervical Cells  [id number]      Hemoglobin A1c   Result Value Ref Range    Hemoglobin A1C 9.8 (H) 4.3 - 6.0 %   Pap imaged thin layer diagnostic with HPV (select HPV order below)   Result Value Ref Range    PAP NIL     Copath Report         Patient Name: MIKE PADILLA  MR#: [id number]  Specimen #: [id number]  Collected: 2/27/2017  Received: 2/28/2017  Reported: 3/2/2017 10:17  Ordering Phy(s): MALKA HERNANDEZ    For improved result formatting, select 'View Enhanced Report Format'  under Linked Documents section.    SPECIMEN/STAIN PROCESS:  Pap Imaged thin layer prep diagnostic (SurePath, FocalPoint with guided  screening)       Pap-Cyto x 1, HPV ordered x 1    SOURCE: Cervical, endocervical  ----------------------------------------------------------------   Pap Imaged thin layer prep diagnostic (SurePath, FocalPoint with guided  screening)  SPECIMEN ADEQUACY:  Satisfactory for evaluation.  -Transformation zone component present.    CYTOLOGIC INTERPRETATION:    Negative for Intraepithelial Lesion or Malignancy         Other Non-Neoplastic Findings:  -Inflammation present.    Electronically signed out by:  CATALINA Krueger (ASCP)    Processed and screened at [facility],  [facility]    CLINICAL HISTORY:  LMP: 6/30/16  Previous  ASC-US  Date of Last Pap: 11/27/15,    Papanicolaou Test Limitations:  Cervical cytology is a screening test  with limited sensitivity; regular screening is critical for cancer  prevention; Pap tests are primarily effective for the  diagnosis/prevention of squamous cell carcinoma, not adenocarcinomas or  other cancers.    TESTING LAB LOCATION:  [address]  [phone]    COLLECTION SITE:  Client:  [facility]  Location: [facility] (B)     Neisseria gonorrhoeae PCR   Result Value Ref Range    Specimen Descrip Cervix     N Gonorrhea PCR  NEG     Negative   Negative for N. gonorrhoeae rRNA by transcription mediated amplification.   A negative result by transcription mediated amplification does not preclude the   presence of N. gonorrhoeae infection because results are dependent on proper   and adequate collection, absence of inhibitors, and sufficient rRNA to be   detected.     Chlamydia trachomatis PCR   Result Value Ref Range    Specimen Description Cervix     Chlamydia Trachomatis PCR  NEG     Negative   Negative for C. trachomatis rRNA by transcription mediated amplification.   A negative result by transcription mediated amplification does not preclude the   presence of C. trachomatis infection because results are dependent on proper   and adequate collection, absence of inhibitors, and sufficient rRNA to be   detected.             Sincerely,    Graciela Gates DO/jorge luis

## 2017-02-28 LAB
ANION GAP SERPL CALCULATED.3IONS-SCNC: 9 MMOL/L (ref 3–14)
BUN SERPL-MCNC: 30 MG/DL (ref 7–30)
C TRACH DNA SPEC QL NAA+PROBE: NORMAL
CALCIUM SERPL-MCNC: 9.6 MG/DL (ref 8.5–10.1)
CHLORIDE SERPL-SCNC: 105 MMOL/L (ref 94–109)
CHOLEST SERPL-MCNC: 168 MG/DL
CO2 SERPL-SCNC: 25 MMOL/L (ref 20–32)
CREAT SERPL-MCNC: 1.1 MG/DL (ref 0.52–1.04)
GFR SERPL CREATININE-BSD FRML MDRD: 53 ML/MIN/1.7M2
GLUCOSE SERPL-MCNC: 153 MG/DL (ref 70–99)
HDLC SERPL-MCNC: 42 MG/DL
LDLC SERPL CALC-MCNC: 66 MG/DL
N GONORRHOEA DNA SPEC QL NAA+PROBE: NORMAL
NONHDLC SERPL-MCNC: 126 MG/DL
POTASSIUM SERPL-SCNC: 4.1 MMOL/L (ref 3.4–5.3)
SODIUM SERPL-SCNC: 139 MMOL/L (ref 133–144)
SPECIMEN SOURCE: NORMAL
SPECIMEN SOURCE: NORMAL
TRIGL SERPL-MCNC: 300 MG/DL
TSH SERPL DL<=0.05 MIU/L-ACNC: 3.55 MU/L (ref 0.4–4)

## 2017-02-28 ASSESSMENT — PATIENT HEALTH QUESTIONNAIRE - PHQ9: SUM OF ALL RESPONSES TO PHQ QUESTIONS 1-9: 5

## 2017-02-28 ASSESSMENT — ASTHMA QUESTIONNAIRES: ACT_TOTALSCORE: 25

## 2017-02-28 ASSESSMENT — ANXIETY QUESTIONNAIRES: GAD7 TOTAL SCORE: 0

## 2017-03-02 LAB
COPATH REPORT: NORMAL
PAP: NORMAL

## 2017-03-03 LAB
FINAL DIAGNOSIS: ABNORMAL
HPV HR 12 DNA CVX QL NAA+PROBE: POSITIVE
HPV16 DNA SPEC QL NAA+PROBE: NEGATIVE
HPV18 DNA SPEC QL NAA+PROBE: NEGATIVE
SPECIMEN DESCRIPTION: ABNORMAL

## 2017-03-13 NOTE — PROGRESS NOTES
Your renal function is slightly abnormal, please recheck in 3 months labs, otherwise normal  Take care  CHARLES GonzalezO

## 2017-03-18 ENCOUNTER — MYC REFILL (OUTPATIENT)
Dept: FAMILY MEDICINE | Facility: CLINIC | Age: 47
End: 2017-03-18

## 2017-03-18 DIAGNOSIS — E78.5 HYPERLIPIDEMIA LDL GOAL <100: ICD-10-CM

## 2017-03-18 DIAGNOSIS — E11.9 TYPE 2 DIABETES MELLITUS (H): ICD-10-CM

## 2017-03-20 DIAGNOSIS — E11.9 TYPE 2 DIABETES MELLITUS (H): Primary | ICD-10-CM

## 2017-03-20 RX ORDER — PEN NEEDLE, DIABETIC 32GX 5/32"
NEEDLE, DISPOSABLE MISCELLANEOUS
Qty: 200 EACH | Refills: 0 | Status: SHIPPED | OUTPATIENT
Start: 2017-03-20 | End: 2017-06-29

## 2017-03-20 NOTE — TELEPHONE ENCOUNTER
metFORMIN (GLUCOPHAGE) 500 MG tablet         Last Written Prescription Date: 9/30/16  Last Fill Quantity: 450, # refills: 1  Last Office Visit with [facility], [facility] or  Health prescribing provider:  2/27/17   Next 5 appointments (look out 90 days)     Mar 30, 2017  3:15 PM CDT   [facility] Visit with Jessica Huizar MD   [facility]    [address] [place] [id number] [phone]                   BP Readings from Last 3 Encounters:   02/27/17 128/74   02/02/17 133/84   09/30/16 146/76     Lab Results   Component Value Date    MICROL 6 01/24/2014     No results found for: MICROALBUMIN  Creatinine   Date Value Ref Range Status   02/27/2017 1.10 (H) 0.52 - 1.04 mg/dL Final   ]  GFR Estimate   Date Value Ref Range Status   02/27/2017 53 (L) >60 mL/min/1.7m2 Final     Comment:     Non  GFR Calc   08/19/2016 70 >60 mL/min/1.7m2 Final     Comment:     Non  GFR Calc   11/27/2015 77 >60 mL/min/1.7m2 Final     Comment:     Non  GFR Calc     GFR Estimate If Black   Date Value Ref Range Status   02/27/2017 64 >60 mL/min/1.7m2 Final     Comment:      GFR Calc   08/19/2016 85 >60 mL/min/1.7m2 Final     Comment:      GFR Calc   11/27/2015 >90   GFR Calc   >60 mL/min/1.7m2 Final     Lab Results   Component Value Date    CHOL 168 02/27/2017     Lab Results   Component Value Date    HDL 42 02/27/2017     Lab Results   Component Value Date    LDL 66 02/27/2017    LDL 45 04/29/2014     Lab Results   Component Value Date    TRIG 300 02/27/2017     Lab Results   Component Value Date    CHOLHDLRATIO 2.1 08/28/2015     Lab Results   Component Value Date    AST 34 01/31/2013     Lab Results   Component Value Date    ALT 33 01/31/2013     Lab Results   Component Value Date    A1C 9.8 02/27/2017    A1C 10.3 08/19/2016    A1C 10.3 04/27/2016    A1C 9.5 11/27/2015    A1C 9.4  08/28/2015     Potassium   Date Value Ref Range Status   02/27/2017 4.1 3.4 - 5.3 mmol/L Final       lovastatin (MEVACOR) 40 MG tablet     Last Written Prescription Date: 2/23/17  Last Fill Quantity: 30, # refills: 0  Last Office Visit with [facility] or [facility] prescribing provider: 2/27/17  Next 5 appointments (look out 90 days)     Mar 30, 2017  3:15 PM CDT   Office Visit with [facility] MD   [facility]    [address] [place] [phone]                   Lab Results   Component Value Date    CHOL 168 02/27/2017     Lab Results   Component Value Date    HDL 42 02/27/2017     Lab Results   Component Value Date    LDL 66 02/27/2017    LDL 45 04/29/2014     Lab Results   Component Value Date    TRIG 300 02/27/2017     Lab Results   Component Value Date    CHOLHDLRATIO 2.1 08/28/2015       insulin pen needle (BD FRANCOISE U/F) 32G X 4 MM   Last Written Prescription Date: 1/27/17  Last Fill Quantity: 100,  # refills: prn   Last Office Visit with [facility], [facility] or  Health prescribing provider: 2/27/17                                         Next 5 appointments (look out 90 days)     Mar 30, 2017  3:15 PM CDT   Office Visit with Jessica Huizar MD   [facility]    [address] [place] [id number] [phone]                  insulin lispro prot & lispro (HUMALOG MIX 75/25 KWIKPEN) injection         Last Written Prescription Date:  1/27/17  Last Fill Quantity: 15 ML,   # refills: 1  Last Office Visit with [facility], [facility] or  Health prescribing provider: 2/27/17  Future Office visit:    Next 5 appointments (look out 90 days)     Mar 30, 2017  3:15 PM CDT   Office Visit with Jessica Huizar MD   [facility]    [address] [place] [id number] [phone]                   Routing refill  request to provider for review/approval because:  Drug not on the [facility] or [facility] refill protocol or controlled substance

## 2017-03-20 NOTE — TELEPHONE ENCOUNTER
insulin pen needle (BD FRANCOISE U/F) 32G X 4 MM    Ordered: prn  Edit     Summary: Use twice daily.  Disp-100 each, R-prn  E-Prescribe   Start: 1/27/2017  Ord/Sold: 1/27/2017 (O)  Report  Taking:   Long-term:   Pharmacy: [facility] [id number] [place] [facility] - [place], [place] - [address]  Med Dose History       Patient Sig: Use twice daily.       Ordered on: 1/27/2017       Authorized by: MALKA HERNANDEZ       Dispense: 100 each       Admin Instructions: Use twice daily.       Prior Authorization: Request PA        Last Office Visit with [facility], [facility] or [facility] prescribing provider: 2-                                         Next 5 appointments (look out 90 days)     Mar 30, 2017  3:15 PM CDT   Office Visit with Jessica Huizar MD   [facility]    [address] [place] [address] [phone]

## 2017-03-20 NOTE — TELEPHONE ENCOUNTER
Prescription approved per [facility] [place] [place] Refill Protocol.     Raquel Doyle RN   March 20, 2017 2:00 PM  [facility] Triage   [phone]

## 2017-03-20 NOTE — TELEPHONE ENCOUNTER
Message from MyChart:  Original authorizing provider: Graciela Gates DO    Coty Myles would like a refill of the following medications:  metFORMIN (GLUCOPHAGE) 500 MG tablet [Graciela Gates DO]  insulin pen needle (BD FRANCOISE U/F) 32G X 4 MM [Graciela Gates DO]  insulin lispro prot & lispro (HUMALOG MIX 75/25 KWIKPEN) injection [Graciela Gates DO]  lovastatin (MEVACOR) 40 MG tablet [Graciela Gates DO]    Preferred pharmacy: [place] [id number] [place] [address]    Comment:

## 2017-03-20 NOTE — TELEPHONE ENCOUNTER
metFORMIN (GLUCOPHAGE) 500 MG tablet    1 ordered  Edit     Summary: Take 3 tablets in the am and 2 in the pm., Disp-450 tablet, R-1, E-Prescribe   Start: 9/30/2016  Ord/Sold: 9/30/2016 (O)  Report  Taking:   Long-term:   Pharmacy: [facility] [id number] [place] [facility] - [address]  Med Dose History       Patient Sig: Take 3 tablets in the am and 2 in the pm.       Ordered on: 9/30/2016       Authorized by: MALKA HERNANDEZ       Dispense: 450 tablet       Admin Instructions: Take 3 tablets in the am and 2 in the pm.        Last Office Visit with G, P or [facility] prescribing provider:  2-   Next 5 appointments (look out 90 days)     Mar 30, 2017  3:15 PM CDT   Office Visit with Jessica Huizar MD   [facility]    [address] [place] [address] [phone]                   BP Readings from Last 3 Encounters:   02/27/17 128/74   02/02/17 133/84   09/30/16 146/76     Lab Results   Component Value Date    MICROL 6 01/24/2014     No results found for: MICROALBUMIN  Creatinine   Date Value Ref Range Status   02/27/2017 1.10 (H) 0.52 - 1.04 mg/dL Final   ]  GFR Estimate   Date Value Ref Range Status   02/27/2017 53 (L) >60 mL/min/1.7m2 Final     Comment:     Non  GFR Calc   08/19/2016 70 >60 mL/min/1.7m2 Final     Comment:     Non  GFR Calc   11/27/2015 77 >60 mL/min/1.7m2 Final     Comment:     Non  GFR Calc     GFR Estimate If Black   Date Value Ref Range Status   02/27/2017 64 >60 mL/min/1.7m2 Final     Comment:      GFR Calc   08/19/2016 85 >60 mL/min/1.7m2 Final     Comment:      GFR Calc   11/27/2015 >90   GFR Calc   >60 mL/min/1.7m2 Final     Lab Results   Component Value Date    CHOL 168 02/27/2017     Lab Results   Component Value Date    HDL 42 02/27/2017     Lab Results   Component Value Date    LDL 66  02/27/2017    LDL 45 04/29/2014     Lab Results   Component Value Date    TRIG 300 02/27/2017     Lab Results   Component Value Date    CHOLHDLRATIO 2.1 08/28/2015     Lab Results   Component Value Date    AST 34 01/31/2013     Lab Results   Component Value Date    ALT 33 01/31/2013     Lab Results   Component Value Date    A1C 9.8 02/27/2017    A1C 10.3 08/19/2016    A1C 10.3 04/27/2016    A1C 9.5 11/27/2015    A1C 9.4 08/28/2015     Potassium   Date Value Ref Range Status   02/27/2017 4.1 3.4 - 5.3 mmol/L Final

## 2017-03-21 NOTE — TELEPHONE ENCOUNTER
Pres[place] per [facility].  Delia Begum RN

## 2017-03-24 DIAGNOSIS — I10 HYPERTENSION GOAL BP (BLOOD PRESSURE) < 130/80: Chronic | ICD-10-CM

## 2017-03-24 RX ORDER — LOVASTATIN 40 MG
40 TABLET ORAL AT BEDTIME
Qty: 90 TABLET | Refills: 3 | Status: SHIPPED | OUTPATIENT
Start: 2017-03-24 | End: 2017-07-06 | Stop reason: SINTOL

## 2017-03-24 RX ORDER — INSULIN LISPRO 100 [IU]/ML
INJECTION, SUSPENSION SUBCUTANEOUS
Qty: 45 ML | Refills: 0 | Status: SHIPPED | OUTPATIENT
Start: 2017-03-24 | End: 2017-07-06

## 2017-03-24 NOTE — TELEPHONE ENCOUNTER
[place] [facility] [place].     Raquel Doyle RN   March 24, 2017 10:40 AM  [facility]   [phone]

## 2017-03-24 NOTE — TELEPHONE ENCOUNTER
levothyroxine (SYNTHROID) 88 MCG tablet   88 mcg, DAILY 0 ordered  Edit     Summary: Take 1 tablet (88 mcg) by mouth daily Due for labs for refills! Please schedule a lab only appt- I will place orders., Disp-30 tablet, R-0, E-Prescribe   Dose, Route, Frequency: 88 mcg, Oral, DAILY  Start: 2/14/2017  Ord/Sold: 2/14/2017 (O)  Report  Taking:   Long-term:   Pharmacy: [place] [id number] [place] [address]  Med Dose History       Patient Sig: Take 1 tablet (88 mcg) by mouth daily Due for labs for refills! Please schedule a lab only appt- I will place orders.       Ordered on: 2/14/2017       Authorized by: MALKA HERNANDEZ       Dispense: 30 tablet       Admin Instructions: Due for labs for refills! Please schedule a lab only appt- I will place orders.       Prior Authorization: Request PA        Last Office Visit with FMUMBERTO, MARTITA or [facility] prescribing provider: 2-   Next 5 appointments (look out 90 days)     Mar 30, 2017  3:15 PM CDT   [facility]   [facility]    [address] [facility] [address] [phone]                   TSH   Date Value Ref Range Status   02/27/2017 3.55 0.40 - 4.00 mU/L Final

## 2017-03-27 DIAGNOSIS — E78.5 HYPERLIPIDEMIA LDL GOAL <100: ICD-10-CM

## 2017-03-27 RX ORDER — LEVOTHYROXINE SODIUM 88 UG/1
88 TABLET ORAL DAILY
Qty: 90 TABLET | Refills: 3 | Status: SHIPPED | OUTPATIENT
Start: 2017-03-27 | End: 2018-03-26

## 2017-03-27 RX ORDER — LOVASTATIN 40 MG
TABLET ORAL
Qty: 30 TABLET | Refills: 0 | OUTPATIENT
Start: 2017-03-27

## 2017-03-27 NOTE — TELEPHONE ENCOUNTER
Prescription approved per [facility] [place] [facility] Refill Protocol.     Raquel Doyle RN

## 2017-03-28 NOTE — TELEPHONE ENCOUNTER
Called patient because she never picked up her script for Norco.    Left VM message for patient to call back and ask to speak to Melissa  to let us know whether she wants this script or not.    Placed script in MA pending folder.    Melissa Dhillon

## 2017-03-30 NOTE — TELEPHONE ENCOUNTER
An Item was picked up at the [facility] :    Date it was picked up?  March 30, 2017    What was picked up?  Envelope/script    Who picked them up?  [place]    Relationship to patient?  Patient     Did they show ID?  Yes    Was it logged in book? Yes    Who gave it to the patient?  Marta Alvarez-Patient Representative

## 2017-03-30 NOTE — TELEPHONE ENCOUNTER
Patient states that she will be in tomorrow to  the script.  Will place back at the .    Grace BAUTISTA, Certified Medical Assistant (AAMA)March 29, 2017 7:04 PM

## 2017-03-31 ENCOUNTER — TELEPHONE (OUTPATIENT)
Dept: NURSING | Facility: CLINIC | Age: 47
End: 2017-03-31

## 2017-03-31 NOTE — TELEPHONE ENCOUNTER
Pharmacy can't fill prescription of [place]. Needs a BLANCA. Please call.  Flor Dudley RN-[facility]

## 2017-04-03 NOTE — TELEPHONE ENCOUNTER
Called [facility], confirmed they are referring to the norco script from 2/27 & gave Dr. Gates's BLANCA number.  Delia Begum RN

## 2017-04-05 ENCOUNTER — OFFICE VISIT (OUTPATIENT)
Dept: OBGYN | Facility: CLINIC | Age: 47
End: 2017-04-05
Payer: COMMERCIAL

## 2017-04-05 VITALS
TEMPERATURE: 98 F | HEIGHT: 67 IN | DIASTOLIC BLOOD PRESSURE: 76 MMHG | WEIGHT: 252.8 LBS | SYSTOLIC BLOOD PRESSURE: 118 MMHG | BODY MASS INDEX: 39.68 KG/M2

## 2017-04-05 DIAGNOSIS — R87.810 ASCUS WITH POSITIVE HIGH RISK HPV CERVICAL: ICD-10-CM

## 2017-04-05 DIAGNOSIS — R87.610 ASCUS WITH POSITIVE HIGH RISK HPV CERVICAL: ICD-10-CM

## 2017-04-05 DIAGNOSIS — N84.1 CERVICAL POLYP: Primary | ICD-10-CM

## 2017-04-05 PROCEDURE — 57500 BIOPSY OF CERVIX: CPT | Performed by: OBSTETRICS & GYNECOLOGY

## 2017-04-05 PROCEDURE — 88305 TISSUE EXAM BY PATHOLOGIST: CPT | Performed by: OBSTETRICS & GYNECOLOGY

## 2017-04-05 PROCEDURE — 99201 ZZC OFFICE/OUTPT VISIT, NEW, LEVEL I: CPT | Mod: 25 | Performed by: OBSTETRICS & GYNECOLOGY

## 2017-04-05 NOTE — MR AVS SNAPSHOT
"              After Visit Summary   4/5/2017    Coty Myles    MRN: [id number]           Patient Information     Date Of Birth          1970        Visit Information        Provider Department      4/5/2017 8:30 AM Jessica Huizar MD [facility]        Today's Diagnoses     Cervical polyp    -  1    ASCUS with positive high risk HPV cervical           Follow-ups after your visit        Who to contact     If you have questions or need follow up information about today's clinic visit or your schedule please contact [facility] directly at [phone].  Normal or non-critical lab and imaging results will be communicated to you by Place[employer], letter or phone within 4 business days after the clinic has received the results. If you do not hear from us within 7 days, please contact the clinic through [employer] or phone. If you have a critical or abnormal lab result, we will notify you by phone as soon as possible.  Submit refill requests through [employer] or call your pharmacy and they will forward the refill request to us. Please allow 3 business days for your refill to be completed.          Additional Information About Your Visit        MyChart Information     [employer] gives you secure access to your electronic health record. If you see a primary care provider, you can also send messages to your care team and make appointments. If you have questions, please call your primary care clinic.  If you do not have a primary care provider, please call [phone] and they will assist you.        Care EveryWhere ID     This is your Care EveryWhere ID. This could be used by other organizations to access your [place] medical records  [id number]        Your Vitals Were     Temperature Height Last Period Breastfeeding? BMI (Body Mass Index)       98  F (36.7  C) (Oral) 5' 6.5\" (1.689 m) 06/30/2016 No 40.19 kg/m2        Blood Pressure from Last 3 Encounters:   04/05/17 118/76   02/27/17 " 128/74   02/02/17 133/84    Weight from Last 3 Encounters:   04/05/17 252 lb 12.8 oz (114.7 kg)   02/27/17 251 lb (113.9 kg)   02/02/17 253 lb (114.8 kg)              We Performed the Following     BIOPSY/EXCIS CERVICAL LESION W/WO FULGURATION     Surgical pathology exam        Primary Care Provider Office Phone # Fax #    Graciela Gates [phone]       [facility] [address] [facility] [address]        Thank you!     Thank you for choosing [facility]  for your care. Our goal is always to provide you with excellent care. Hearing back from our patients is one way we can continue to improve our services. Please take a few minutes to complete the written survey that you may receive in the mail after your visit with us. Thank you!             Your Updated Medication List - Protect others around you: Learn how to safely use, store and throw away your medicines at www.disposemymeds.org.          This list is accurate as of: 4/5/17 11:59 PM.  Always use your most recent med list.                   Brand Name Dispense Instructions for use    albuterol 108 (90 BASE) MCG/ACT Inhaler    albuterol    1 Inhaler    Inhale 2 puffs into the lungs every 6 hours       BD FRANCOISE U/F 32G X 4 MM   Generic drug:  insulin pen needle     200 each    USE TWICE DAILY.       blood glucose monitoring lancets     1 Box    Testing twice daily.       blood glucose monitoring test strip    no brand specified    1 Month    2-3 times daily testing       fluticasone 110 MCG/ACT Inhaler    FLOVENT HFA    3 Inhaler    Inhale 2 puffs into the lungs 2 times daily       HYDROcodone-acetaminophen 5-325 MG per tablet    NORCO    40 tablet    Take 1 tablet by mouth daily as needed for pain       insulin lispro prot & lispro injection    HumaLOG MIX 75/25 KWIKPEN    45 mL    Use twice daily. 48 units.       * INVOKANA 100 MG tablet   Generic drug:  canagliflozin      Take 100 mg by  mouth       * canagliflozin 300 MG tablet    INVOKANA    90 tablet    Take 1 tablet (300 mg) by mouth every morning (before breakfast)       levothyroxine 88 MCG tablet    SYNTHROID/LEVOTHROID    90 tablet    Take 1 tablet (88 mcg) by mouth daily       LEXAPRO PO      Take 40 mg by mouth daily.       lisinopril-hydrochlorothiazide 10-12.5 MG per tablet    PRINZIDE/ZESTORETIC    180 tablet    Take 2 tablets by mouth daily       LORazepam 0.5 MG tablet    ATIVAN     0.5 mg as needed       lovastatin 40 MG tablet    MEVACOR    90 tablet    Take 1 tablet (40 mg) by mouth At Bedtime       LUNESTA 3 MG tablet   Generic drug:  eszopiclone     0    1 TABLET AT BEDTIME PRN       metFORMIN 500 MG tablet    GLUCOPHAGE    450 tablet    TAKE 3 TABLETS BY MOUTH IN THE AM AND 2 TABS IN THE PM.       metoprolol 100 MG tablet    LOPRESSOR    180 tablet    Take 1 tablet (100 mg) by mouth 2 times daily       Multi-vitamin Tabs tablet   Generic drug:  multivitamin, therapeutic with minerals     0    1 TAB PO QD       olopatadine 0.1 % ophthalmic solution    PATANOL    5 mL    Place 1 drop into both eyes 2 times daily       UNKNOWN MED DOSAGE     0    FOLIC ACID- 1 TABLET DAILY       vitamin D 1000 UNITS capsule      1 CAPSULE DAILY       * Notice:  This list has 2 medication(s) that are the same as other medications prescribed for you. Read the directions carefully, and ask your doctor or other care provider to review them with you.

## 2017-04-05 NOTE — NURSING NOTE
"Chief Complaint   Patient presents with     Consult     RECHECK       Initial /76 (BP Location: Right arm, Patient Position: Chair, Cuff Size: Adult Large)  Temp 98  F (36.7  C) (Oral)  Ht 5' 6.5\" (1.689 m)  Wt 252 lb 12.8 oz (114.7 kg)  LMP 2016  Breastfeeding? No  BMI 40.19 kg/m2 Estimated body mass index is 40.19 kg/(m^2) as calculated from the following:    Height as of this encounter: 5' 6.5\" (1.689 m).    Weight as of this encounter: 252 lb 12.8 oz (114.7 kg).  BP completed using cuff size: large        The following HM Due: mammogram      The following patient reported/Care Every where data was sent to:  P ABSTRACT QUALITY INITIATIVES [78812]  n/a    patient has appointment for today and Pt declines to have mammo.             "

## 2017-04-05 NOTE — PROGRESS NOTES
CC: cervical polyp  HPI:  Coty Myles is a 47 year old female here for a consultation regarding:     Recent annual physical exam noted cervical polyp.  Recurrent ASCUS paps with + HP HPV --- ongoing for past several  Yrs.  Patient does not want colpo.     3 months of nearly continuous bleeding light and heavy for 3 months Sept to Nov then sptopped and nothing.  occ hot flashes.      Patient's last menstrual period was 06/30/2016.            Past GYN history:   Lab Results   Component Value Date    PAP NIL 02/27/2017    PAP ASC-US 11/27/2015    PAP NIL 11/07/2014           Past Medical History:   Diagnosis Date     Alexia and dyslexia      Allergic rhinitis due to other allergen      ASCUS on Pap smear 9/20/13    neg HPV. refused colp. repeat pap/HPV in 2014 with annual exam     ASCUS with positive high risk HPV 8/13/13,11/27/15    9/11/12 colp- WNL     Attention deficit disorder without mention of hyperactivity      Calculus of kidney      Cervical high risk HPV (human papillomavirus) test positive 02/27/2017     Depressive disorder, not elsewhere classified      Diabetes mellitus with complication (H) 3/22/2010     Essential hypertension, benign      High risk HPV infection 11/7/14    normal pap     Migraine headaches 5/11/2011     Mild intermittent asthma      Moderate major depression (H) 11/22/2010     Other and unspecified hyperlipidemia      Sciatica      Type II or unspecified type diabetes mellitus without mention of complication, not stated as uncontrolled        Past Surgical History:   Procedure Laterality Date     ENT SURGERY       ORTHOPEDIC SURGERY       SURGICAL HISTORY OF -   1990    right knee arthroscopic     SURGICAL HISTORY OF -   2000    lump on neck removed       Family History documented in the data base    Medications:    Current Outpatient Prescriptions:      levothyroxine (SYNTHROID/LEVOTHROID) 88 MCG tablet, Take 1 tablet (88 mcg) by mouth daily, Disp: 90 tablet, Rfl: 3     insulin lispro  prot & lispro (HUMALOG MIX 75/25 KWIKPEN) injection, Use twice daily. 48 units., Disp: 45 mL, Rfl: 0     lovastatin (MEVACOR) 40 MG tablet, Take 1 tablet (40 mg) by mouth At Bedtime, Disp: 90 tablet, Rfl: 3     metFORMIN (GLUCOPHAGE) 500 MG tablet, TAKE 3 TABLETS BY MOUTH IN THE AM AND 2 TABS IN THE PM., Disp: 450 tablet, Rfl: 1     BD FRANCOISE U/F 32G X 4 MM insulin pen needle, USE TWICE DAILY., Disp: 200 each, Rfl: 0     HYDROcodone-acetaminophen (NORCO) 5-325 MG per tablet, Take 1 tablet by mouth daily as needed for pain, Disp: 40 tablet, Rfl: 0     olopatadine (PATANOL) 0.1 % ophthalmic solution, Place 1 drop into both eyes 2 times daily, Disp: 5 mL, Rfl: 3     metoprolol (LOPRESSOR) 100 MG tablet, Take 1 tablet (100 mg) by mouth 2 times daily, Disp: 180 tablet, Rfl: 1     canagliflozin (INVOKANA) 300 MG tablet, Take 1 tablet (300 mg) by mouth every morning (before breakfast), Disp: 90 tablet, Rfl: 0     blood glucose monitoring (NO BRAND SPECIFIED) test strip, 2-3 times daily testing, Disp: 1 Month, Rfl: 12     INVOKANA 100 MG tablet, Take 100 mg by mouth, Disp: , Rfl: 3     LORazepam (ATIVAN) 0.5 MG tablet, 0.5 mg as needed, Disp: , Rfl: 2     lisinopril-hydrochlorothiazide (PRINZIDE,ZESTORETIC) 10-12.5 MG per tablet, Take 2 tablets by mouth daily, Disp: 180 tablet, Rfl: 3     blood glucose monitoring (ACCU-CHEK MULTICLIX) lancets, Testing twice daily., Disp: 1 Box, Rfl: 12     fluticasone (FLOVENT HFA) 110 MCG/ACT inhaler, Inhale 2 puffs into the lungs 2 times daily, Disp: 3 Inhaler, Rfl: 1     albuterol (ALBUTEROL) 108 (90 BASE) MCG/ACT inhaler, Inhale 2 puffs into the lungs every 6 hours, Disp: 1 Inhaler, Rfl: 0     Escitalopram Oxalate (LEXAPRO PO), Take 40 mg by mouth daily., Disp: , Rfl:      VITAMIN D 1000 UNIT PO CAPS, 1 CAPSULE DAILY, Disp: , Rfl:      LUNESTA 3 MG OR TABS, 1 TABLET AT BEDTIME PRN, Disp: 0, Rfl: 0     UNKNOWN MED DOSAGE, FOLIC ACID- 1 TABLET DAILY, Disp: 0, Rfl: 0     MULTI-VITAMIN OR  "TABS, 1 TAB PO QD, Disp: 0, Rfl: 0    Allergies:  Meperidine; Percocet [oxycodone-acetaminophen]; Prednisone; Trazodone; and Zocor [hmg-coa-r inhibitors]    ROS:  She denies abnormal vaginal bleeding, discharge or unusual pelvic pain, no dysuria, frequency or hematuria.    EXAM:  /76 (BP Location: Right arm, Patient Position: Chair, Cuff Size: Adult Large)  Temp 98  F (36.7  C) (Oral)  Ht 5' 6.5\" (1.689 m)  Wt 252 lb 12.8 oz (114.7 kg)  LMP 06/30/2016  Breastfeeding? No  BMI 40.19 kg/m2  Appearance: in no apparent distress     Pelvic:  EG - normal adult female.  BUS - within normal limits.  Vagina - well rugated, no discharge.  Cervix -  < 1 cm cervical polyp BME not done.     procedure: after verbal consent was given, the < 1 cm  endocervical polyp was grasped with a ring forceps and gently removed without difficulty.  Silver nitrate was used to cauterize the base of the polyp.  The polyp was submitted to pathology.  Pt tolerated it without a problem.        ASSESSMENT:  Encounter Diagnoses   Name Primary?     Cervical polyp Yes     ASCUS with positive high risk HPV cervical         PLAN:   Cervical polyp removed and sent to path today  Persistent ASCUS pap with HR HPV- other --> discussed and reviewed pap findings going back to 2012.  Patient declines another colpo.  discussed ok to do cotesting in one yr as she is good about follow up.      Noted today that patient has a lot of anxiety/discomfort with pelvic exams and really does not like to be touched \"down there\". But she tolerated the polyp removal ok today.     Jessica Huizar MD            "

## 2017-04-06 DIAGNOSIS — E11.9 TYPE 2 DIABETES MELLITUS (H): ICD-10-CM

## 2017-04-06 LAB — COPATH REPORT: NORMAL

## 2017-04-06 NOTE — TELEPHONE ENCOUNTER
insulin lispro prot & lispro (HUMALOG MIX 75/25 KWIKPEN) injection    0 ordered  Reorder     Summary: Use twice daily. 48 units., Disp-45 mL, R-0, E-Prescribe   Start: 3/24/2017  Ord/Sold: 3/24/2017 (O)  Report  Taking:   Long-term:   Pharmacy: [facility] [id number] [place] [facility] [place], [place] - [address]  Med Dose History  ChangeDiscontinue       Patient Sig: Use twice daily. 48 units.       Ordered on: 3/24/2017       Authorized by: MALKA HERNANDEZ       Dispense: 45 mL       Admin Instructions: Use twice daily. 48 units.       Prior Authorization: Request PA          Last Office Visit with [facility] or [facility] prescribing provider:  2-        BP Readings from Last 3 Encounters:   04/05/17 118/76   02/27/17 128/74   02/02/17 133/84     Lab Results   Component Value Date    MICROL 6 01/24/2014     Lab Results   Component Value Date    UMALCR 4.51 01/24/2014     Creatinine   Date Value Ref Range Status   02/27/2017 1.10 (H) 0.52 - 1.04 mg/dL Final   ]  GFR Estimate   Date Value Ref Range Status   02/27/2017 53 (L) >60 mL/min/1.7m2 Final     Comment:     Non  GFR Calc   08/19/2016 70 >60 mL/min/1.7m2 Final     Comment:     Non  GFR Calc   11/27/2015 77 >60 mL/min/1.7m2 Final     Comment:     Non  GFR Calc     GFR Estimate If Black   Date Value Ref Range Status   02/27/2017 64 >60 mL/min/1.7m2 Final     Comment:      GFR Calc   08/19/2016 85 >60 mL/min/1.7m2 Final     Comment:      GFR Calc   11/27/2015 >90   GFR Calc   >60 mL/min/1.7m2 Final     Lab Results   Component Value Date    CHOL 168 02/27/2017     Lab Results   Component Value Date    HDL 42 02/27/2017     Lab Results   Component Value Date    LDL 66 02/27/2017     Lab Results   Component Value Date    TRIG 300 02/27/2017     Lab Results   Component Value Date    CHOLHDLRATIO 2.1 08/28/2015     Lab Results   Component Value Date    AST 34  01/31/2013     Lab Results   Component Value Date    ALT 33 01/31/2013     Lab Results   Component Value Date    A1C 9.8 02/27/2017    A1C 10.3 08/19/2016    A1C 10.3 04/27/2016    A1C 9.5 11/27/2015    A1C 9.4 08/28/2015     Potassium   Date Value Ref Range Status   02/27/2017 4.1 3.4 - 5.3 mmol/L Final

## 2017-04-07 RX ORDER — INSULIN LISPRO 100 [IU]/ML
INJECTION, SUSPENSION SUBCUTANEOUS
Qty: 45 ML | Refills: 0 | Status: SHIPPED | OUTPATIENT
Start: 2017-04-07 | End: 2017-05-01

## 2017-04-07 NOTE — TELEPHONE ENCOUNTER
Prescription approved per [facility] Refill [facility].     Raquel Doyle RN

## 2017-05-01 ENCOUNTER — OFFICE VISIT (OUTPATIENT)
Dept: INTERNAL MEDICINE | Facility: CLINIC | Age: 47
End: 2017-05-01
Payer: COMMERCIAL

## 2017-05-01 VITALS
BODY MASS INDEX: 39.91 KG/M2 | SYSTOLIC BLOOD PRESSURE: 120 MMHG | DIASTOLIC BLOOD PRESSURE: 82 MMHG | WEIGHT: 251 LBS | HEART RATE: 78 BPM | TEMPERATURE: 97.9 F | OXYGEN SATURATION: 97 %

## 2017-05-01 DIAGNOSIS — E11.8 TYPE 2 DIABETES MELLITUS WITH COMPLICATION, WITH LONG-TERM CURRENT USE OF INSULIN (H): ICD-10-CM

## 2017-05-01 DIAGNOSIS — Z13.5 SCREENING FOR DIABETIC RETINOPATHY: ICD-10-CM

## 2017-05-01 DIAGNOSIS — Z79.4 TYPE 2 DIABETES MELLITUS WITH COMPLICATION, WITH LONG-TERM CURRENT USE OF INSULIN (H): ICD-10-CM

## 2017-05-01 DIAGNOSIS — E78.5 HYPERLIPIDEMIA LDL GOAL <100: ICD-10-CM

## 2017-05-01 DIAGNOSIS — G72.9 MYOPATHY: ICD-10-CM

## 2017-05-01 LAB
ANION GAP SERPL CALCULATED.3IONS-SCNC: 15 MMOL/L (ref 3–14)
BUN SERPL-MCNC: 34 MG/DL (ref 7–30)
CALCIUM SERPL-MCNC: 9.8 MG/DL (ref 8.5–10.1)
CHLORIDE SERPL-SCNC: 100 MMOL/L (ref 94–109)
CHOLEST SERPL-MCNC: 189 MG/DL
CK SERPL-CCNC: 212 U/L (ref 30–225)
CO2 SERPL-SCNC: 24 MMOL/L (ref 20–32)
CREAT SERPL-MCNC: 1.05 MG/DL (ref 0.52–1.04)
GFR SERPL CREATININE-BSD FRML MDRD: 56 ML/MIN/1.7M2
GLUCOSE SERPL-MCNC: 208 MG/DL (ref 70–99)
HBA1C MFR BLD: 9.7 % (ref 4.3–6)
HDLC SERPL-MCNC: 45 MG/DL
LDLC SERPL CALC-MCNC: ABNORMAL MG/DL
LDLC SERPL DIRECT ASSAY-MCNC: 92 MG/DL
NONHDLC SERPL-MCNC: 144 MG/DL
POTASSIUM SERPL-SCNC: 4.3 MMOL/L (ref 3.4–5.3)
SODIUM SERPL-SCNC: 139 MMOL/L (ref 133–144)
TRIGL SERPL-MCNC: 429 MG/DL

## 2017-05-01 PROCEDURE — 82550 ASSAY OF CK (CPK): CPT | Performed by: NURSE PRACTITIONER

## 2017-05-01 PROCEDURE — 80048 BASIC METABOLIC PNL TOTAL CA: CPT | Performed by: NURSE PRACTITIONER

## 2017-05-01 PROCEDURE — 83721 ASSAY OF BLOOD LIPOPROTEIN: CPT | Performed by: NURSE PRACTITIONER

## 2017-05-01 PROCEDURE — 80061 LIPID PANEL: CPT | Performed by: NURSE PRACTITIONER

## 2017-05-01 PROCEDURE — 83036 HEMOGLOBIN GLYCOSYLATED A1C: CPT | Performed by: NURSE PRACTITIONER

## 2017-05-01 PROCEDURE — 36415 COLL VENOUS BLD VENIPUNCTURE: CPT | Performed by: NURSE PRACTITIONER

## 2017-05-01 PROCEDURE — 99213 OFFICE O/P EST LOW 20 MIN: CPT | Performed by: NURSE PRACTITIONER

## 2017-05-01 ASSESSMENT — PAIN SCALES - GENERAL: PAINLEVEL: NO PAIN (0)

## 2017-05-01 NOTE — PATIENT INSTRUCTIONS
Stop taking your statin for 2 weeks. If you still have the cramping after this time, restart taking it  If the cramping and muscle aches have resolved call the clinic and I will order a different statin for you to try

## 2017-05-01 NOTE — MR AVS SNAPSHOT
After Visit Summary   5/1/2017    Coty Myles    MRN: [id number]           Patient Information     Date Of Birth          1970        Visit Information        Provider Department      5/1/2017 9:00 AM Anamaria Thomas APRN CNP [facility]        Today's Diagnoses     Uncontrolled type 2 diabetes mellitus with complication, with long-term current use of insulin (H)    -  1    Screening for diabetic retinopathy        Hyperlipidemia LDL goal <100          Care Instructions    Stop taking your statin for 2 weeks. If you still have the cramping after this time, restart taking it  If the cramping and muscle aches have resolved call the clinic and I will order a different statin for you to try            Follow-ups after your visit        Who to contact     If you have questions or need follow up information about today's clinic visit or your schedule please contact [facility] directly at [phone].  Normal or non-critical lab and imaging results will be communicated to you by MyChart, letter or phone within 4 business days after the clinic has received the results. If you do not hear from us within 7 days, please contact the clinic through [employer] or phone. If you have a critical or abnormal lab result, we will notify you by phone as soon as possible.  Submit refill requests through COTA or call your pharmacy and they will forward the refill request to us. Please allow 3 business days for your refill to be completed.          Additional Information About Your Visit        MyChart Information     COTA gives you secure access to your electronic health record. If you see a primary care provider, you can also send messages to your care team and make appointments. If you have questions, please call your primary care clinic.  If you do not have a primary care provider, please call [phone] and they will assist you.        Care EveryWhere ID      This is your Care EveryWhere ID. This could be used by other organizations to access your [facility] medical records  [id number]        Your Vitals Were     Pulse Temperature Last Period Pulse Oximetry Breastfeeding? BMI (Body Mass Index)    78 97.9  F (36.6  C) (Oral) 06/30/2016 97% No 39.91 kg/m2       Blood Pressure from Last 3 Encounters:   05/01/17 120/82   04/05/17 118/76   02/27/17 128/74    Weight from Last 3 Encounters:   05/01/17 251 lb (113.9 kg)   04/05/17 252 lb 12.8 oz (114.7 kg)   02/27/17 251 lb (113.9 kg)              We Performed the Following     Albumin Random Urine Quantitative     Hemoglobin A1c     Lipid panel reflex to direct LDL          Today's Medication Changes          These changes are accurate as of: 5/1/17  9:38 AM.  If you have any questions, ask your nurse or doctor.               These medicines have changed or have updated prescriptions.        Dose/Directions    canagliflozin 300 MG tablet   Commonly known as:  INVOKANA   This may have changed:  Another medication with the same name was removed. Continue taking this medication, and follow the directions you see here.   Used for:  Uncontrolled type 2 diabetes mellitus with complication, with long-term current use of insulin (H)   Changed by:  Anamaria Thomas APRN CNP        Dose:  300 mg   Take 1 tablet (300 mg) by mouth every morning (before breakfast)   Quantity:  90 tablet   Refills:  3            Where to get your medicines      These medications were sent to [facility] [id number] [facility] - [place], [place] - [address]  [address] [address]     Phone:  [phone]     canagliflozin 300 MG tablet                Primary Care Provider Office Phone # Fax #    Graciela Otthusseingenesis Kody,  [phone]       [address] [address]        Thank you!     Thank you for choosing [facility]  for your care. Our goal is  always to provide you with excellent care. Hearing back from our patients is one way we can continue to improve our services. Please take a few minutes to complete the written survey that you may receive in the mail after your visit with us. Thank you!             Your Updated Medication List - Protect others around you: Learn how to safely use, store and throw away your medicines at www.disposemymeds.org.          This list is accurate as of: 5/1/17  9:38 AM.  Always use your most recent med list.                   Brand Name Dispense Instructions for use    albuterol 108 (90 BASE) MCG/ACT Inhaler    albuterol    1 Inhaler    Inhale 2 puffs into the lungs every 6 hours       BD FRANCOISE U/F 32G X 4 MM   Generic drug:  insulin pen needle     200 each    USE TWICE DAILY.       blood glucose monitoring lancets     1 Box    Testing twice daily.       blood glucose monitoring test strip    no brand specified    1 Month    2-3 times daily testing       canagliflozin 300 MG tablet    INVOKANA    90 tablet    Take 1 tablet (300 mg) by mouth every morning (before breakfast)       fluticasone 110 MCG/ACT Inhaler    FLOVENT HFA    3 Inhaler    Inhale 2 puffs into the lungs 2 times daily       HYDROcodone-acetaminophen 5-325 MG per tablet    NORCO    40 tablet    Take 1 tablet by mouth daily as needed for pain       insulin lispro prot & lispro injection    HumaLOG MIX 75/25 KWIKPEN    45 mL    Use twice daily. 48 units.       levothyroxine 88 MCG tablet    SYNTHROID/LEVOTHROID    90 tablet    Take 1 tablet (88 mcg) by mouth daily       LEXAPRO PO      Take 40 mg by mouth daily.       lisinopril-hydrochlorothiazide 10-12.5 MG per tablet    PRINZIDE/ZESTORETIC    180 tablet    Take 2 tablets by mouth daily       LORazepam 0.5 MG tablet    ATIVAN     0.5 mg as needed Reported on 5/1/2017       lovastatin 40 MG tablet    MEVACOR    90 tablet    Take 1 tablet (40 mg) by mouth At Bedtime       LUNESTA 3 MG tablet   Generic drug:   eszopiclone     0    1 TABLET AT BEDTIME PRN       metFORMIN 500 MG tablet    GLUCOPHAGE    450 tablet    TAKE 3 TABLETS BY MOUTH IN THE AM AND 2 TABS IN THE PM.       metoprolol 100 MG tablet    LOPRESSOR    180 tablet    Take 1 tablet (100 mg) by mouth 2 times daily       Multi-vitamin Tabs tablet   Generic drug:  multivitamin, therapeutic with minerals     0    1 TAB PO QD       olopatadine 0.1 % ophthalmic solution    PATANOL    5 mL    Place 1 drop into both eyes 2 times daily       UNKNOWN MED DOSAGE     0    FOLIC ACID- 1 TABLET DAILY       vitamin D 1000 UNITS capsule      1 CAPSULE DAILY

## 2017-05-01 NOTE — PROGRESS NOTES
SUBJECTIVE:                                                    Coty Myles is a 47 year old female who presents to clinic today for the following health issues:        Diabetes Follow-up      Patient is checking blood sugars: few  times a week    Diabetic concerns: None     Symptoms of hypoglycemia (low blood sugar): shaky at around 3 in the afternoon and needs a snack.     Paresthesias (numbness or burning in feet) or sores: No     Date of last diabetic eye exam: over due     Hyperlipidemia Follow-Up      Rate your low fat/cholesterol diet?: fair    Taking statin?  Yes, muscle aches after starting statin    Other lipid medications/supplements?:  none     Hypertension Follow-up      Outpatient blood pressures are being checked at work.  Results are with in normal range.    Low Salt Diet: low salt         Amount of exercise or physical activity: 1 day/week for an average of 15-30 minutes    Problems taking medications regularly: No    Medication side effects: muscle aches    Diet: low salt, low fat/cholesterol and carbohydrate counting    Was following with endocrinology. Location is too far for convenience  I increased invokana 2/2/17  Lab Results   Component Value Date    A1C 9.8 02/27/2017    A1C 10.3 08/19/2016    A1C 10.3 04/27/2016    A1C 9.5 11/27/2015    A1C 9.4 08/28/2015     This morning glucose was 160-170  Was feeling lightheaded in the afternoon. Now she has a snack 3-4 hours after lunch and is no longer having hypoglycemic episodes  Does not check blood sugar daily. Checks maybe twice a week. Only remembers reading from this morning  Denies other concerns    Complains of intermittent muscle aches in right lower extremity from knee to ankle  Happens 5-7 times a week. Concerns it's from her statin      Problem list and histories reviewed & adjusted, as indicated.  Additional history: none    Patient Active Problem List   Diagnosis     Attention deficit disorder     Sciatica     Hypothyroidism      iamLUMBOSACRAL NEURITIS NOS     Hyperlipidemia LDL goal <100     Hypertension goal BP (blood pressure) < 130/80     Moderate major depression (H)     Family history of ischemic heart disease     Intermittent asthma     ASCUS with positive high risk HPV cervical     Morbid obesity (H)     Headache     Uncontrolled type 2 diabetes mellitus with complication (H)     Past Surgical History:   Procedure Laterality Date     ENT SURGERY       ORTHOPEDIC SURGERY       SURGICAL HISTORY OF -   1990    right knee arthroscopic     SURGICAL HISTORY OF - 2000    lump on neck removed       Social History   Substance Use Topics     Smoking status: Never Smoker     Smokeless tobacco: Never Used     Alcohol use Yes      Comment: 3 to 4 per year     Family History   Problem Relation Age of Onset     HEART DISEASE Father      heart attack at 64     DIABETES Father      C.A.D. Father      DIABETES Paternal Grandmother      CANCER Paternal Grandfather      Lung CA     DIABETES Maternal Grandmother      DIABETES Mother      Gynecology Mother      hysterectomy- ovarian cyst     Respiratory Mother      COPD- dependent on oxygen, passed away at age 69     Allergies Mother      to percocet     DIABETES Paternal Aunt      DIABETES Paternal Uncle      Alcohol/Drug Paternal Uncle      DIABETES Brother      CEREBROVASCULAR DISEASE No family hx of          Current Outpatient Prescriptions   Medication Sig Dispense Refill     canagliflozin (INVOKANA) 300 MG tablet Take 1 tablet (300 mg) by mouth every morning (before breakfast) 90 tablet 3     blood glucose monitoring (NO BRAND SPECIFIED) test strip 2-3 times daily testing 1 Box 3     blood glucose monitoring (NO BRAND SPECIFIED) meter device kit Use to test blood sugar 2-3 times daily or as directed. 1 kit 0     levothyroxine (SYNTHROID/LEVOTHROID) 88 MCG tablet Take 1 tablet (88 mcg) by mouth daily 90 tablet 3     insulin lispro prot & lispro (HUMALOG MIX 75/25 KWIKPEN) injection Use twice  daily. 48 units. 45 mL 0     lovastatin (MEVACOR) 40 MG tablet Take 1 tablet (40 mg) by mouth At Bedtime 90 tablet 3     metFORMIN (GLUCOPHAGE) 500 MG tablet TAKE 3 TABLETS BY MOUTH IN THE AM AND 2 TABS IN THE PM. 450 tablet 1     BD FRANCOISE U/F 32G X 4 MM insulin pen needle USE TWICE DAILY. 200 each 0     olopatadine (PATANOL) 0.1 % ophthalmic solution Place 1 drop into both eyes 2 times daily 5 mL 3     metoprolol (LOPRESSOR) 100 MG tablet Take 1 tablet (100 mg) by mouth 2 times daily 180 tablet 1     lisinopril-hydrochlorothiazide (PRINZIDE,ZESTORETIC) 10-12.5 MG per tablet Take 2 tablets by mouth daily 180 tablet 3     blood glucose monitoring (ACCU-CHEK MULTICLIX) lancets Testing twice daily. 1 Box 12     albuterol (ALBUTEROL) 108 (90 BASE) MCG/ACT inhaler Inhale 2 puffs into the lungs every 6 hours 1 Inhaler 0     Escitalopram Oxalate (LEXAPRO PO) Take 40 mg by mouth daily.       VITAMIN D 1000 UNIT PO CAPS 1 CAPSULE DAILY       LUNESTA 3 MG OR TABS 1 TABLET AT BEDTIME PRN 0 0     UNKNOWN MED DOSAGE FOLIC ACID- 1 TABLET DAILY 0 0     MULTI-VITAMIN OR TABS 1 TAB PO QD 0 0     [DISCONTINUED] HUMALOG MIX 75/25 KWIKPEN (75-25) 100 UNIT/ML susp INJECT 48 UNITS SUBCUTANEOUSLY TWICE DAILY AS INSTRUCTED 45 mL 0     HYDROcodone-acetaminophen (NORCO) 5-325 MG per tablet Take 1 tablet by mouth daily as needed for pain (Patient not taking: Reported on 5/1/2017) 40 tablet 0     [DISCONTINUED] blood glucose monitoring (NO BRAND SPECIFIED) test strip 2-3 times daily testing 1 Month 12     LORazepam (ATIVAN) 0.5 MG tablet 0.5 mg as needed Reported on 5/1/2017  2     fluticasone (FLOVENT HFA) 110 MCG/ACT inhaler Inhale 2 puffs into the lungs 2 times daily (Patient not taking: Reported on 5/1/2017) 3 Inhaler 1       Reviewed and updated as needed this visit by clinical staff  Tobacco  Allergies  Meds  Med Hx  Surg Hx  Fam Hx  Soc Hx      Reviewed and updated as needed this visit by Provider         ROS:  Constitutional,  HEENT, cardiovascular, pulmonary, gi and gu systems are negative, except as otherwise noted.    OBJECTIVE:                                                    /82 (BP Location: Right arm, Patient Position: Chair, Cuff Size: Adult Large)  Pulse 78  Temp 97.9  F (36.6  C) (Oral)  Wt 251 lb (113.9 kg)  LMP 06/30/2016  SpO2 97%  Breastfeeding? No  BMI 39.91 kg/m2  Body mass index is 39.91 kg/(m^2).  GENERAL: healthy, alert and no distress  RESP: lungs clear to auscultation - no rales, rhonchi or wheezes  CV: regular rate and rhythm, normal S1 S2, no S3 or S4, no murmur, click or rub, no peripheral edema  MS: no gross musculoskeletal defects noted, no edema  SKIN: no suspicious lesions or rashes  NEURO: Normal strength and tone, mentation intact and speech normal  PSYCH: mentation appears normal, affect normal/bright    Diagnostic Test Results:  none      ASSESSMENT/PLAN:                                                        ICD-10-CM    1. Uncontrolled type 2 diabetes mellitus with complication, with long-term current use of insulin (H) E11.8 canagliflozin (INVOKANA) 300 MG tablet    E11.65 Hemoglobin A1c    Z79.4 blood glucose monitoring (NO BRAND SPECIFIED) meter device kit     Albumin Random Urine Quantitative     CANCELED: Albumin Random Urine Quantitative   2. Screening for diabetic retinopathy Z13.5    3. Hyperlipidemia LDL goal <100 E78.5 Lipid panel reflex to direct LDL   4. Type 2 diabetes mellitus with complication, with long-term current use of insulin (H) E11.8 blood glucose monitoring (NO BRAND SPECIFIED) test strip    Z79.4 Basic metabolic panel   5. Myopathy G72.9 CK total       Muscle pain does not appear to be statin related myopathy but reasonable to discontinue for 2 weeks to evaluate for symptom resolution. Advised if pain not resolved after 2 weeks restart statin. If pain has completely resolved, call clinic and will try alternative statin treatment  Medication adjustment pending A1c.  Likely will need insulin increase vs consider adding third agent.   Monitor kidney function with Inovakan  BP well controlled  Continue current medications    CLOVER Gandhi CNP  [facility]

## 2017-05-01 NOTE — NURSING NOTE
"Chief Complaint   Patient presents with     Diabetes       Initial /82 (BP Location: Right arm, Patient Position: Chair, Cuff Size: Adult Large)  Pulse 78  Temp 97.9  F (36.6  C) (Oral)  Wt 251 lb (113.9 kg)  LMP 06/30/2016  SpO2 97%  Breastfeeding? No  BMI 39.91 kg/m2 Estimated body mass index is 39.91 kg/(m^2) as calculated from the following:    Height as of 4/5/17: 5' 6.5\" (1.689 m).    Weight as of this encounter: 251 lb (113.9 kg).  Medication Reconciliation: complete.  ANITRA Terry MA      "

## 2017-05-02 ENCOUNTER — TELEPHONE (OUTPATIENT)
Dept: INTERNAL MEDICINE | Facility: CLINIC | Age: 47
End: 2017-05-02

## 2017-05-02 NOTE — TELEPHONE ENCOUNTER
I left voicemail for patient to return call to clinic. Please see if I'm available when she calls or ask for a time that I can call her back.  You can let her know I'm calling about her diabetes. Her renal function has declined slightly since increasing the Invokana. I discontinued that medication and would like to start something new, called Trulicity. It's a once weekly injectable medication.

## 2017-05-02 NOTE — TELEPHONE ENCOUNTER
Spoke with patient regarding lab results. Invokana discontinued.  Reviewed proper usage and risk and benefits of Trulicity.  Follow up in 1 month (either clinic visit or by phone) will discuss glucose and consider increasing dosage of Trulicity  Recheck BMP in 1 month to monitor kidney function  Recheck lipids in 3 months. Triglycerides spiked.

## 2017-05-02 NOTE — PROGRESS NOTES
Coty,  Your lab results have been released to [facility].  I tried to reach you by phone to make some medication adjustments. Please call the clinic back or let me know when I can call you to discuss this.   Anamaria Thomas, CNP

## 2017-05-02 NOTE — TELEPHONE ENCOUNTER
"Patient returned call to RN line and left message for call back to her at [phone].  I sent [facility] message to Anamaria, she is not available right now and states she will call patient back.  Attempted to call patient at home number, left message on voicemail identified as \"Coty\" requesting call back to clinic to discuss; I did ask her to leave us a time that might work best for provider to call her back directly if she ends up with voicemail on RN line.  Routed to Anamaria to reach out to patient later.  Megan Hendrickson, RN  [facility]        "

## 2017-05-02 NOTE — TELEPHONE ENCOUNTER
Patient called back, I spoke to her, ghassan Conrad but no response.  Patient states she will have her phone with her until about 2 or 2:15 pm for call back from provider.    Routed to Anamaria.  Megan Hendrickson RN  [facility]

## 2017-05-04 ENCOUNTER — MYC MEDICAL ADVICE (OUTPATIENT)
Dept: INTERNAL MEDICINE | Facility: CLINIC | Age: 47
End: 2017-05-04

## 2017-05-04 ENCOUNTER — TELEPHONE (OUTPATIENT)
Dept: INTERNAL MEDICINE | Facility: CLINIC | Age: 47
End: 2017-05-04

## 2017-05-04 NOTE — TELEPHONE ENCOUNTER
I sent in a prescription for Bydureon which is covered. Same mechanism of action and use.   Again, please call in 1 month, or make clinic visit and we will evaluate for improvement in blood sugars. Don't forget to check daily in the morning and record the numbers.

## 2017-05-04 NOTE — TELEPHONE ENCOUNTER
PA is needed for the medication, Trulicity 0.75mg/0.5 ml Pen Injctr.     Insurance has been called.  Alternatives that are covered are: Victoza, Byetta and Bydureon ( Bydureon is also dosed weekly).         Would you like to start PA or Rx alternative medication?    If PA, please list all medications this patient has tried along with any contraindications that may have been experienced in the past. Thank you.    Pharmacy: [facility]   Phone: [phone]    Insurance Plan: [employer]  Phone: [phone]  Pt ID: [id number]  Group: [id number]    Forwarded to Anamaria Thomas CNP for review.

## 2017-05-04 NOTE — TELEPHONE ENCOUNTER
Called and left message for patient to call back.  Also sent a Mychart with the below message from Nicol Thomas CNP.

## 2017-05-09 ENCOUNTER — MYC MEDICAL ADVICE (OUTPATIENT)
Dept: FAMILY MEDICINE | Facility: CLINIC | Age: 47
End: 2017-05-09

## 2017-05-09 NOTE — TELEPHONE ENCOUNTER
Panel Management Review      Patient has the following on her problem list:     Diabetes    ASA: Not on File    Last A1C  Lab Results   Component Value Date    A1C 9.7 05/01/2017    A1C 9.8 02/27/2017    A1C 10.3 08/19/2016    A1C 10.3 04/27/2016    A1C 9.5 11/27/2015     A1C tested: FAILED    Last LDL:    Lab Results   Component Value Date    CHOL 189 05/01/2017     Lab Results   Component Value Date    HDL 45 05/01/2017     Lab Results   Component Value Date    LDL  05/01/2017     Cannot estimate LDL when triglyceride exceeds 400 mg/dL    LDL 92 05/01/2017     Lab Results   Component Value Date    TRIG 429 05/01/2017     Lab Results   Component Value Date    CHOLHDLRATIO 2.1 08/28/2015     Lab Results   Component Value Date    NHDL 144 05/01/2017       Is the patient on a Statin? YES             Is the patient on Aspirin? NO    Medications     HMG CoA Reductase Inhibitors    lovastatin (MEVACOR) 40 MG tablet          Last three blood pressure readings:  BP Readings from Last 3 Encounters:   05/01/17 120/82   04/05/17 118/76   02/27/17 128/74       Date of last diabetes office visit: 5/1/17     Tobacco History:     History   Smoking Status     Never Smoker   Smokeless Tobacco     Never Used           Composite cancer screening  Chart review shows that this patient is due/due soon for the following None  Summary:    Patient is due/failing the following:   A1C    Action needed:   None at this time- patient was just seen 5/1/17 by Anamaria Thomas CNP, recheck in 3 months    Type of outreach:    None    Questions for provider review:    None                                                                                                                                      Nichelle Low MA

## 2017-05-14 ENCOUNTER — TRANSFERRED RECORDS (OUTPATIENT)
Dept: HEALTH INFORMATION MANAGEMENT | Facility: CLINIC | Age: 47
End: 2017-05-14

## 2017-06-29 DIAGNOSIS — E11.9 TYPE 2 DIABETES MELLITUS (H): ICD-10-CM

## 2017-06-29 NOTE — TELEPHONE ENCOUNTER
exenatide ER (BYDUREON) 2 MG recon vial kit susp for weekly inj         Last Written Prescription Date: 5-4-17  Last Fill Quantity: 1, # refills: 1  Last Office Visit with [facility], [facility] or [facility] prescribing provider:  5-1-17   Next 5 appointments (look out 90 days)     Jun 29, 2017  3:20 PM CDT   SHORT with Vijaya Mahoney PA-C   [facility] ([facility])    [address] [place] [id number] [phone]                   BP Readings from Last 3 Encounters:   05/01/17 120/82   04/05/17 118/76   02/27/17 128/74     Lab Results   Component Value Date    MICROL 6 01/24/2014     Lab Results   Component Value Date    UMALCR 4.51 01/24/2014     Creatinine   Date Value Ref Range Status   05/01/2017 1.05 (H) 0.52 - 1.04 mg/dL Final   ]  GFR Estimate   Date Value Ref Range Status   05/01/2017 56 (L) >60 mL/min/1.7m2 Final     Comment:     Non  GFR Calc   02/27/2017 53 (L) >60 mL/min/1.7m2 Final     Comment:     Non  GFR Calc   08/19/2016 70 >60 mL/min/1.7m2 Final     Comment:     Non  GFR Calc     GFR Estimate If Black   Date Value Ref Range Status   05/01/2017 67 >60 mL/min/1.7m2 Final     Comment:      GFR Calc   02/27/2017 64 >60 mL/min/1.7m2 Final     Comment:      GFR Calc   08/19/2016 85 >60 mL/min/1.7m2 Final     Comment:      GFR Calc     Lab Results   Component Value Date    CHOL 189 05/01/2017     Lab Results   Component Value Date    HDL 45 05/01/2017     Lab Results   Component Value Date    LDL  05/01/2017     Cannot estimate LDL when triglyceride exceeds 400 mg/dL    LDL 92 05/01/2017     Lab Results   Component Value Date    TRIG 429 05/01/2017     Lab Results   Component Value Date    CHOLHDLRATIO 2.1 08/28/2015     Lab Results   Component Value Date    AST 34 01/31/2013     Lab Results   Component Value Date    ALT 33 01/31/2013     Lab Results   Component  Value Date    A1C 9.7 05/01/2017    A1C 9.8 02/27/2017    A1C 10.3 08/19/2016    A1C 10.3 04/27/2016    A1C 9.5 11/27/2015     Potassium   Date Value Ref Range Status   05/01/2017 4.3 3.4 - 5.3 mmol/L Final

## 2017-06-29 NOTE — TELEPHONE ENCOUNTER
Medication Detail      Disp Refills Start End CHAVA   BD FRANCOISE U/F 32G X 4 MM insulin pen needle 200 each 0 3/20/2017  No   Sig: USE TWICE DAILY.   Class: E-Prescribe   Notes to Pharmacy: PATIENT NEEDS A REFILL   Order: [id number]        Last Office Visit with [facility], [facility] or [facility] prescribing provider: 2/27/2017                                         Next 5 appointments (look out 90 days)     Jul 06, 2017  8:20 AM CDT   SHORT with CLOVER Ramirez CNP   [facility]    [address] [place] [id number]   [phone]

## 2017-06-30 RX ORDER — PEN NEEDLE, DIABETIC 32GX 5/32"
NEEDLE, DISPOSABLE MISCELLANEOUS
Qty: 200 EACH | Refills: 0 | Status: SHIPPED | OUTPATIENT
Start: 2017-06-30 | End: 2017-09-30

## 2017-06-30 NOTE — TELEPHONE ENCOUNTER
Prescription approved per [facility] [place] [place] Refill Protocol.     Raquel Doyle RN

## 2017-07-03 ENCOUNTER — MYC MEDICAL ADVICE (OUTPATIENT)
Dept: FAMILY MEDICINE | Facility: CLINIC | Age: 47
End: 2017-07-03

## 2017-07-03 NOTE — TELEPHONE ENCOUNTER
Panel Management Review      Patient has the following on her problem list:     Diabetes    ASA:  Not on File    Last A1C  Lab Results   Component Value Date    A1C 9.7 05/01/2017    A1C 9.8 02/27/2017    A1C 10.3 08/19/2016    A1C 10.3 04/27/2016    A1C 9.5 11/27/2015     A1C tested: FAILED    Last LDL:    Lab Results   Component Value Date    CHOL 189 05/01/2017     Lab Results   Component Value Date    HDL 45 05/01/2017     Lab Results   Component Value Date    LDL  05/01/2017     Cannot estimate LDL when triglyceride exceeds 400 mg/dL    LDL 92 05/01/2017     Lab Results   Component Value Date    TRIG 429 05/01/2017     Lab Results   Component Value Date    CHOLHDLRATIO 2.1 08/28/2015     Lab Results   Component Value Date    NHDL 144 05/01/2017       Is the patient on a Statin? YES             Is the patient on Aspirin? NO    Medications     HMG CoA Reductase Inhibitors    lovastatin (MEVACOR) 40 MG tablet          Last three blood pressure readings:  BP Readings from Last 3 Encounters:   05/01/17 120/82   04/05/17 118/76   02/27/17 128/74       Date of last diabetes office visit: 5/1/17     Tobacco History:     History   Smoking Status     Never Smoker   Smokeless Tobacco     Never Used           Composite cancer screening  Chart review shows that this patient is due/due soon for the following None  Summary:    Patient is due/failing the following:   A1C    Action needed:   Patient needs office visit for  Diabetes Check.    Type of outreach:     None [facility]    Questions for provider review:    None                                                                                                                                      Nichelle Low MA

## 2017-07-06 ENCOUNTER — OFFICE VISIT (OUTPATIENT)
Dept: FAMILY MEDICINE | Facility: CLINIC | Age: 47
End: 2017-07-06
Payer: COMMERCIAL

## 2017-07-06 VITALS
TEMPERATURE: 97.6 F | DIASTOLIC BLOOD PRESSURE: 82 MMHG | BODY MASS INDEX: 39.59 KG/M2 | SYSTOLIC BLOOD PRESSURE: 120 MMHG | HEART RATE: 91 BPM | WEIGHT: 249 LBS | OXYGEN SATURATION: 96 %

## 2017-07-06 DIAGNOSIS — Z79.4 TYPE 2 DIABETES MELLITUS WITH COMPLICATION, WITH LONG-TERM CURRENT USE OF INSULIN (H): ICD-10-CM

## 2017-07-06 DIAGNOSIS — E11.8 TYPE 2 DIABETES MELLITUS WITH COMPLICATION, WITH LONG-TERM CURRENT USE OF INSULIN (H): ICD-10-CM

## 2017-07-06 LAB
ANION GAP SERPL CALCULATED.3IONS-SCNC: 9 MMOL/L (ref 3–14)
BUN SERPL-MCNC: 24 MG/DL (ref 7–30)
CALCIUM SERPL-MCNC: 9.2 MG/DL (ref 8.5–10.1)
CHLORIDE SERPL-SCNC: 106 MMOL/L (ref 94–109)
CO2 SERPL-SCNC: 25 MMOL/L (ref 20–32)
CREAT SERPL-MCNC: 0.87 MG/DL (ref 0.52–1.04)
GFR SERPL CREATININE-BSD FRML MDRD: 69 ML/MIN/1.7M2
GLUCOSE SERPL-MCNC: 95 MG/DL (ref 70–99)
HBA1C MFR BLD: 8.8 % (ref 4.3–6)
POTASSIUM SERPL-SCNC: 3.7 MMOL/L (ref 3.4–5.3)
SODIUM SERPL-SCNC: 140 MMOL/L (ref 133–144)

## 2017-07-06 PROCEDURE — 36415 COLL VENOUS BLD VENIPUNCTURE: CPT | Performed by: NURSE PRACTITIONER

## 2017-07-06 PROCEDURE — 80048 BASIC METABOLIC PNL TOTAL CA: CPT | Performed by: NURSE PRACTITIONER

## 2017-07-06 PROCEDURE — 99213 OFFICE O/P EST LOW 20 MIN: CPT | Performed by: NURSE PRACTITIONER

## 2017-07-06 PROCEDURE — 83036 HEMOGLOBIN GLYCOSYLATED A1C: CPT | Performed by: NURSE PRACTITIONER

## 2017-07-06 RX ORDER — INSULIN LISPRO 100 [IU]/ML
INJECTION, SUSPENSION SUBCUTANEOUS
Qty: 45 ML | Refills: 3 | Status: SHIPPED | OUTPATIENT
Start: 2017-07-06 | End: 2017-07-06

## 2017-07-06 RX ORDER — ROSUVASTATIN CALCIUM 5 MG/1
5 TABLET, COATED ORAL DAILY
Qty: 30 TABLET | Refills: 1 | Status: SHIPPED | OUTPATIENT
Start: 2017-07-06 | End: 2017-07-21

## 2017-07-06 RX ORDER — INSULIN LISPRO 100 [IU]/ML
INJECTION, SUSPENSION SUBCUTANEOUS
Qty: 45 ML | Refills: 3 | Status: SHIPPED | OUTPATIENT
Start: 2017-07-06 | End: 2017-07-31

## 2017-07-06 ASSESSMENT — PAIN SCALES - GENERAL: PAINLEVEL: NO PAIN (0)

## 2017-07-06 NOTE — MR AVS SNAPSHOT
After Visit Summary   7/6/2017    Coty Myles    MRN: [id number]           Patient Information     Date Of Birth          1970        Visit Information        Provider Department      7/6/2017 8:20 AM Anamaria Thomas APRN CNP [facility]        Today's Diagnoses     Uncontrolled type 2 diabetes mellitus with complication, with long-term current use of insulin (H)    -  1    Type 2 diabetes mellitus with complication, with long-term current use of insulin (H)          Care Instructions    We increased insulin to 50 units twice daily  Continue to work on increasing daily exercise            Follow-ups after your visit        Who to contact     If you have questions or need follow up information about today's clinic visit or your schedule please contact [facility] directly at [phone].  Normal or non-critical lab and imaging results will be communicated to you by MyChart, letter or phone within 4 business days after the clinic has received the results. If you do not hear from us within 7 days, please contact the clinic through [employer] or phone. If you have a critical or abnormal lab result, we will notify you by phone as soon as possible.  Submit refill requests through [employer] or call your pharmacy and they will forward the refill request to us. Please allow 3 business days for your refill to be completed.          Additional Information About Your Visit        MyChart Information     [employer] gives you secure access to your electronic health record. If you see a primary care provider, you can also send messages to your care team and make appointments. If you have questions, please call your primary care clinic.  If you do not have a primary care provider, please call [phone] and they will assist you.        Care EveryWhere ID     This is your Care EveryWhere ID. This could be used by other organizations to access your [facility]  records  [id number]        Your Vitals Were     Pulse Temperature Last Period Pulse Oximetry BMI (Body Mass Index)       91 97.6  F (36.4  C) (Oral) 06/30/2016 96% 39.59 kg/m2        Blood Pressure from Last 3 Encounters:   07/06/17 120/82   05/01/17 120/82   04/05/17 118/76    Weight from Last 3 Encounters:   07/06/17 249 lb (112.9 kg)   05/01/17 251 lb (113.9 kg)   04/05/17 252 lb 12.8 oz (114.7 kg)              We Performed the Following     Albumin Random Urine Quantitative     Basic metabolic panel     Hemoglobin A1c          Today's Medication Changes          These changes are accurate as of: 7/6/17  8:44 AM.  If you have any questions, ask your nurse or doctor.               Start taking these medicines.        Dose/Directions    rosuvastatin 5 MG tablet   Commonly known as:  CRESTOR   Used for:  Uncontrolled type 2 diabetes mellitus with complication, with long-term current use of insulin (H)   Started by:  Anamaria Thomas APRN CNP        Dose:  5 mg   Take 1 tablet (5 mg) by mouth daily   Quantity:  30 tablet   Refills:  1         These medicines have changed or have updated prescriptions.        Dose/Directions    insulin lispro prot & lispro injection   Commonly known as:  HumaLOG MIX 75/25 KWIKPEN   This may have changed:  additional instructions   Used for:  Type 2 diabetes mellitus with complication, with long-term current use of insulin (H)   Changed by:  Anamaria Thomas APRN CNP        Use twice daily. 50 units.   Quantity:  45 mL   Refills:  3         Stop taking these medicines if you haven't already. Please contact your care team if you have questions.     lovastatin 40 MG tablet   Commonly known as:  MEVACOR   Stopped by:  Anamaria Thomas APRN CNP                Where to get your medicines      These medications were sent to [facility] [id number] [place] [facility] [place], [place] - [address] [facility]  [address] [place] [address]     Phone:  [phone]     exenatide  ER 2 MG recon vial kit susp for weekly inj    rosuvastatin 5 MG tablet         Some of these will need a paper prescription and others can be bought over the counter.  Ask your nurse if you have questions.     Bring a paper prescription for each of these medications     insulin lispro prot & lispro injection                Primary Care Provider Office Phone # Fax Steven Gates [phone]       [address] [address]        Equal Access to Services     ORI JORGE : Hadii aad ku hadasho Soomaali, waaxda luqadaha, qaybta kaalmada adeegyada, waxay idiin hayaan soraya renner. So [facility] [phone].    ATENCIÓN: Si habla español, tiene a garcia disposición servicios gratuitos de asistencia lingüística. Llame al [phone].    We comply with applicable federal civil rights laws and [place] laws. We do not discriminate on the basis of race, color, national origin, age, disability sex, sexual orientation or gender identity.            Thank you!     Thank you for choosing [facility]  for your care. Our goal is always to provide you with excellent care. Hearing back from our patients is one way we can continue to improve our services. Please take a few minutes to complete the written survey that you may receive in the mail after your visit with us. Thank you!             Your Updated Medication List - Protect others around you: Learn how to safely use, store and throw away your medicines at www.disposemymeds.org.          This list is accurate as of: 7/6/17  8:44 AM.  Always use your most recent med list.                   Brand Name Dispense Instructions for use Diagnosis    albuterol 108 (90 BASE) MCG/ACT Inhaler    albuterol    1 Inhaler    Inhale 2 puffs into the lungs every 6 hours    Intermittent asthma, uncomplicated       BD FRANCOISE U/F 32G X 4 MM   Generic drug:  insulin pen needle     200 each    USE  TWICE A DAY    Type 2 diabetes mellitus (H)       blood glucose monitoring lancets     1 Box    Testing twice daily.    Type 1 diabetes mellitus, uncontrolled (H)       blood glucose monitoring meter device kit    no brand specified    1 kit    Use to test blood sugar 2-3 times daily or as directed.    Uncontrolled type 2 diabetes mellitus with complication, with long-term current use of insulin (H)       blood glucose monitoring test strip    no brand specified    1 Box    2-3 times daily testing    Type 2 diabetes mellitus with complication, with long-term current use of insulin (H)       exenatide ER 2 MG recon vial kit susp for weekly inj    BYDUREON    1 kit    Inject 2 mg Subcutaneous every 7 days    Uncontrolled type 2 diabetes mellitus with complication, with long-term current use of insulin (H)       fluticasone 110 MCG/ACT Inhaler    FLOVENT HFA    3 Inhaler    Inhale 2 puffs into the lungs 2 times daily    Morbid obesity, unspecified obesity type (H)       HYDROcodone-acetaminophen 5-325 MG per tablet    NORCO    40 tablet    Take 1 tablet by mouth daily as needed for pain    Chronic nonintractable headache, unspecified headache type, Sciatica, unspecified laterality       insulin lispro prot & lispro injection    HumaLOG MIX 75/25 KWIKPEN    45 mL    Use twice daily. 50 units.    Type 2 diabetes mellitus with complication, with long-term current use of insulin (H)       levothyroxine 88 MCG tablet    SYNTHROID/LEVOTHROID    90 tablet    Take 1 tablet (88 mcg) by mouth daily    Hypertension goal BP (blood pressure) < 130/80       LEXAPRO PO      Take 40 mg by mouth daily.        lisinopril-hydrochlorothiazide 10-12.5 MG per tablet    PRINZIDE/ZESTORETIC    180 tablet    Take 2 tablets by mouth daily    HTN (hypertension)       LORazepam 0.5 MG tablet    ATIVAN     0.5 mg as needed Reported on 5/1/2017        LUNESTA 3 MG tablet   Generic drug:  eszopiclone     0    1 TABLET AT BEDTIME PRN        metFORMIN  500 MG tablet    GLUCOPHAGE    450 tablet    TAKE 3 TABLETS BY MOUTH IN THE AM AND 2 TABS IN THE PM.    Type 2 diabetes mellitus (H)       metoprolol 100 MG tablet    LOPRESSOR    180 tablet    Take 1 tablet (100 mg) by mouth 2 times daily    Hypertension goal BP (blood pressure) < 130/80       Multi-vitamin Tabs tablet   Generic drug:  multivitamin, therapeutic with minerals     0    1 TAB PO QD        olopatadine 0.1 % ophthalmic solution    PATANOL    5 mL    Place 1 drop into both eyes 2 times daily    Conjunctivitis, allergic, bilateral       rosuvastatin 5 MG tablet    CRESTOR    30 tablet    Take 1 tablet (5 mg) by mouth daily    Uncontrolled type 2 diabetes mellitus with complication, with long-term current use of insulin (H)       UNKNOWN MED DOSAGE     0    FOLIC ACID- 1 TABLET DAILY    OTC -PATIENT CHOICE       vitamin D 1000 UNITS capsule      1 CAPSULE DAILY

## 2017-07-06 NOTE — PROGRESS NOTES
SUBJECTIVE:                                                    Coty Myles is a 47 year old female who presents to clinic today for the following health issues:      Diabetes Follow-up      Patient is checking blood sugars: 2 times a week    Diabetic concerns: None     Symptoms of hypoglycemia (low blood sugar): none     Paresthesias (numbness or burning in feet) or sores: No     Date of last diabetic eye exam: done on 5/17      Amount of exercise or physical activity: None    Problems taking medications regularly: Yes,  side effects from the lisinopril.    Medication side effects: muscle aches and cramping     Diet: low salt, low fat/cholesterol and carbohydrate counting    Started Bydureon injections 5/4/17  Tolerating without side effects    Stopped statin. Concerned it was causing her leg cramps. She reports the muscle cramps went away after stopping the Lipitor    She got a fit bit and is now encouraged to get more physical activity  She is happy about recent weight loss    Lab Results   Component Value Date    A1C 8.8 07/06/2017    A1C 9.7 05/01/2017    A1C 9.8 02/27/2017    A1C 10.3 08/19/2016    A1C 10.3 04/27/2016         Problem list and histories reviewed & adjusted, as indicated.  Additional history: none    Patient Active Problem List   Diagnosis     Attention deficit disorder     Sciatica     Hypothyroidism     iamLUMBOSACRAL NEURITIS NOS     Hyperlipidemia LDL goal <100     Hypertension goal BP (blood pressure) < 130/80     Moderate major depression (H)     Family history of ischemic heart disease     Intermittent asthma     ASCUS with positive high risk HPV cervical     Morbid obesity (H)     Headache     Uncontrolled type 2 diabetes mellitus with complication (H)     Past Surgical History:   Procedure Laterality Date     ENT SURGERY       ORTHOPEDIC SURGERY       SURGICAL HISTORY OF -   1990    right knee arthroscopic     SURGICAL HISTORY OF -   2000    lump on neck removed       Social History    Substance Use Topics     Smoking status: Never Smoker     Smokeless tobacco: Never Used     Alcohol use Yes      Comment: 3 to 4 per year     Family History   Problem Relation Age of Onset     HEART DISEASE Father      heart attack at 64     DIABETES Father      C.A.D. Father      DIABETES Paternal Grandmother      CANCER Paternal Grandfather      Lung CA     DIABETES Maternal Grandmother      DIABETES Mother      Gynecology Mother      hysterectomy- ovarian cyst     Respiratory Mother      COPD- dependent on oxygen, passed away at age 69     Allergies Mother      to percocet     DIABETES Paternal Aunt      DIABETES Paternal Uncle      Alcohol/Drug Paternal Uncle      DIABETES Brother      CEREBROVASCULAR DISEASE No family hx of          Current Outpatient Prescriptions   Medication Sig Dispense Refill     rosuvastatin (CRESTOR) 5 MG tablet Take 1 tablet (5 mg) by mouth daily 30 tablet 1     exenatide ER (BYDUREON) 2 MG recon vial kit susp for weekly inj Inject 2 mg Subcutaneous every 7 days 1 kit 1     insulin lispro prot & lispro (HUMALOG MIX 75/25 KWIKPEN) injection 50 Units twice daily 45 mL 3     BD FRANCOISE U/F 32G X 4 MM insulin pen needle USE TWICE A  each 0     blood glucose monitoring (NO BRAND SPECIFIED) test strip 2-3 times daily testing 1 Box 3     blood glucose monitoring (NO BRAND SPECIFIED) meter device kit Use to test blood sugar 2-3 times daily or as directed. 1 kit 0     levothyroxine (SYNTHROID/LEVOTHROID) 88 MCG tablet Take 1 tablet (88 mcg) by mouth daily 90 tablet 3     metFORMIN (GLUCOPHAGE) 500 MG tablet TAKE 3 TABLETS BY MOUTH IN THE AM AND 2 TABS IN THE PM. 450 tablet 1     HYDROcodone-acetaminophen (NORCO) 5-325 MG per tablet Take 1 tablet by mouth daily as needed for pain 40 tablet 0     olopatadine (PATANOL) 0.1 % ophthalmic solution Place 1 drop into both eyes 2 times daily 5 mL 3     metoprolol (LOPRESSOR) 100 MG tablet Take 1 tablet (100 mg) by mouth 2 times daily 180 tablet 1      LORazepam (ATIVAN) 0.5 MG tablet 0.5 mg as needed Reported on 5/1/2017  2     blood glucose monitoring (ACCU-CHEK MULTICLIX) lancets Testing twice daily. 1 Box 12     fluticasone (FLOVENT HFA) 110 MCG/ACT inhaler Inhale 2 puffs into the lungs 2 times daily 3 Inhaler 1     albuterol (ALBUTEROL) 108 (90 BASE) MCG/ACT inhaler Inhale 2 puffs into the lungs every 6 hours 1 Inhaler 0     Escitalopram Oxalate (LEXAPRO PO) Take 40 mg by mouth daily.       VITAMIN D 1000 UNIT PO CAPS 1 CAPSULE DAILY       LUNESTA 3 MG OR TABS 1 TABLET AT BEDTIME PRN 0 0     UNKNOWN MED DOSAGE FOLIC ACID- 1 TABLET DAILY 0 0     MULTI-VITAMIN OR TABS 1 TAB PO QD 0 0     [DISCONTINUED] insulin lispro prot & lispro (HUMALOG MIX 75/25 KWIKPEN) injection Use twice daily. 50 units. 45 mL 3     [DISCONTINUED] exenatide ER (BYDUREON) 2 MG recon vial kit susp for weekly inj Inject 2 mg Subcutaneous every 7 days 1 kit 1     [DISCONTINUED] insulin lispro prot & lispro (HUMALOG MIX 75/25 KWIKPEN) injection Use twice daily. 48 units. 45 mL 0     lisinopril-hydrochlorothiazide (PRINZIDE,ZESTORETIC) 10-12.5 MG per tablet Take 2 tablets by mouth daily (Patient not taking: Reported on 7/6/2017) 180 tablet 3       Reviewed and updated as needed this visit by clinical staff  Tobacco  Allergies  Meds  Med Hx  Surg Hx  Fam Hx  Soc Hx      Reviewed and updated as needed this visit by Provider         ROS:  Constitutional, HEENT, cardiovascular, pulmonary, gi and gu systems are negative, except as otherwise noted.    OBJECTIVE:     /82  Pulse 91  Temp 97.6  F (36.4  C) (Oral)  Wt 249 lb (112.9 kg)  LMP 06/30/2016  SpO2 96%  BMI 39.59 kg/m2  Body mass index is 39.59 kg/(m^2).  GENERAL: healthy, alert and no distress  RESP: lungs clear to auscultation - no rales, rhonchi or wheezes  CV: regular rate and rhythm, normal S1 S2, no S3 or S4, no murmur, click or rub, no peripheral edema and peripheral pulses strong    Diagnostic Test Results:  A1c  8.8%    ASSESSMENT/PLAN:       ICD-10-CM    1. Uncontrolled type 2 diabetes mellitus with complication, with long-term current use of insulin (H) E11.8 Hemoglobin A1c    E11.65 Basic metabolic panel    Z79.4 rosuvastatin (CRESTOR) 5 MG tablet     exenatide ER (BYDUREON) 2 MG recon vial kit susp for weekly inj     Albumin Random Urine Quantitative     CANCELED: Albumin Random Urine Quantitative   2. Type 2 diabetes mellitus with complication, with long-term current use of insulin (H) E11.8 insulin lispro prot & lispro (HUMALOG MIX 75/25 KWIKPEN) injection    Z79.4 DISCONTINUED: insulin lispro prot & lispro (HUMALOG MIX 75/25 KWIKPEN) injection       Recommend trying rosuvastatin. If intolerable, will discontinue and not try additional statin  Increased insulin to 50 Units BID  She has only been on Bydureon for 2 months. Expect additional improvement in A1c  Continue with working on diet and exercise  Follow up early fall  If A1c still greater than goal 8%, consider referral to [facility]

## 2017-07-06 NOTE — NURSING NOTE
"Chief Complaint   Patient presents with     Diabetes     High A1C       Initial /90 (BP Location: Right arm, Patient Position: Chair, Cuff Size: Adult Large)  Pulse 91  Temp 97.6  F (36.4  C) (Oral)  Wt 249 lb (112.9 kg)  LMP 06/30/2016  SpO2 96%  BMI 39.59 kg/m2 Estimated body mass index is 39.59 kg/(m^2) as calculated from the following:    Height as of 4/5/17: 5' 6.5\" (1.689 m).    Weight as of this encounter: 249 lb (112.9 kg).  Medication Reconciliation: complete.  ANITRA Terry MA      "

## 2017-07-07 NOTE — PROGRESS NOTES
Coty,  Your lab results have been released to [facility]. Your metabolic panel (looks at kidney function and electrolytes) is within normal ranges.   Anamaria Thomas, CNP

## 2017-07-21 ENCOUNTER — OFFICE VISIT (OUTPATIENT)
Dept: FAMILY MEDICINE | Facility: CLINIC | Age: 47
End: 2017-07-21
Payer: COMMERCIAL

## 2017-07-21 VITALS
TEMPERATURE: 97.3 F | BODY MASS INDEX: 40.02 KG/M2 | OXYGEN SATURATION: 95 % | WEIGHT: 255 LBS | HEART RATE: 93 BPM | RESPIRATION RATE: 14 BRPM | DIASTOLIC BLOOD PRESSURE: 80 MMHG | SYSTOLIC BLOOD PRESSURE: 128 MMHG | HEIGHT: 67 IN

## 2017-07-21 DIAGNOSIS — M25.512 ACUTE PAIN OF LEFT SHOULDER: Primary | ICD-10-CM

## 2017-07-21 DIAGNOSIS — I10 ESSENTIAL HYPERTENSION: ICD-10-CM

## 2017-07-21 PROCEDURE — 99213 OFFICE O/P EST LOW 20 MIN: CPT | Performed by: NURSE PRACTITIONER

## 2017-07-21 RX ORDER — LISINOPRIL/HYDROCHLOROTHIAZIDE 10-12.5 MG
2 TABLET ORAL DAILY
Qty: 180 TABLET | Refills: 1 | Status: SHIPPED | OUTPATIENT
Start: 2017-07-21 | End: 2018-04-02

## 2017-07-21 RX ORDER — METOPROLOL TARTRATE 100 MG
100 TABLET ORAL 2 TIMES DAILY
Qty: 180 TABLET | Refills: 1 | Status: SHIPPED | OUTPATIENT
Start: 2017-07-21 | End: 2018-01-20

## 2017-07-21 RX ORDER — HYDROCODONE BITARTRATE AND ACETAMINOPHEN 5; 325 MG/1; MG/1
1 TABLET ORAL DAILY PRN
Qty: 40 TABLET | Refills: 0 | Status: SHIPPED | OUTPATIENT
Start: 2017-07-21 | End: 2018-02-05

## 2017-07-21 ASSESSMENT — PAIN SCALES - GENERAL: PAINLEVEL: MILD PAIN (3)

## 2017-07-21 NOTE — PROGRESS NOTES
SUBJECTIVE:                                                    Coty Myles is a 47 year old female who presents to clinic today for the following health issues:    Musculoskeletal problem/pain      Duration: 7 months    Description  Location: left shoulder and arm    Intensity:  moderate, severe    Accompanying signs and symptoms: radiation of pain to arm    History  Previous similar problem: no   Previous evaluation:  none    Precipitating or alleviating factors:  Trauma or overuse: no   Aggravating factors include: lifting, exercise and rotation of left shoulder and loss of range of motion    Therapies tried and outcome: stretching, exercises and acetaminophen    Patient denies injury, history of neck injuries.  She does have some chronic cervicalgia that she does exercises for.      Patient tried crestor for one dose and developed leg pain.  She stopped taking meds and does not wish to take another one.      Problem list and histories reviewed & adjusted, as indicated.  Additional history: as documented    Patient Active Problem List   Diagnosis     Attention deficit disorder     Sciatica     Hypothyroidism     iamLUMBOSACRAL NEURITIS NOS     Hyperlipidemia LDL goal <100     Hypertension goal BP (blood pressure) < 130/80     Moderate major depression (H)     Family history of ischemic heart disease     Intermittent asthma     ASCUS with positive high risk HPV cervical     Morbid obesity (H)     Headache     Uncontrolled type 2 diabetes mellitus with complication (H)     Past Surgical History:   Procedure Laterality Date     ENT SURGERY       ORTHOPEDIC SURGERY       SURGICAL HISTORY OF -   1990    right knee arthroscopic     SURGICAL HISTORY OF -   2000    lump on neck removed       Social History   Substance Use Topics     Smoking status: Never Smoker     Smokeless tobacco: Never Used     Alcohol use Yes      Comment: 3 to 4 per year     Family History   Problem Relation Age of Onset     HEART DISEASE Father       heart attack at 64     DIABETES Father      C.A.D. Father      DIABETES Paternal Grandmother      CANCER Paternal Grandfather      Lung CA     DIABETES Maternal Grandmother      DIABETES Mother      Gynecology Mother      hysterectomy- ovarian cyst     Respiratory Mother      COPD- dependent on oxygen, passed away at age 69     Allergies Mother      to percocet     DIABETES Paternal Aunt      DIABETES Paternal Uncle      Alcohol/Drug Paternal Uncle      DIABETES Brother      CEREBROVASCULAR DISEASE No family hx of          Current Outpatient Prescriptions   Medication Sig Dispense Refill     lisinopril-hydrochlorothiazide (PRINZIDE/ZESTORETIC) 10-12.5 MG per tablet Take 2 tablets by mouth daily 180 tablet 1     metoprolol (LOPRESSOR) 100 MG tablet Take 1 tablet (100 mg) by mouth 2 times daily 180 tablet 1     STATIN NOT PRESCRIBED, INTENTIONAL, 1 each daily Please choose reason not prescribed, below       HYDROcodone-acetaminophen (NORCO) 5-325 MG per tablet Take 1 tablet by mouth daily as needed for pain 40 tablet 0     exenatide ER (BYDUREON) 2 MG recon vial kit susp for weekly inj Inject 2 mg Subcutaneous every 7 days 1 kit 1     insulin lispro prot & lispro (HUMALOG MIX 75/25 KWIKPEN) injection 50 Units twice daily 45 mL 3     BD FRANCOISE U/F 32G X 4 MM insulin pen needle USE TWICE A  each 0     blood glucose monitoring (NO BRAND SPECIFIED) test strip 2-3 times daily testing 1 Box 3     blood glucose monitoring (NO BRAND SPECIFIED) meter device kit Use to test blood sugar 2-3 times daily or as directed. 1 kit 0     levothyroxine (SYNTHROID/LEVOTHROID) 88 MCG tablet Take 1 tablet (88 mcg) by mouth daily 90 tablet 3     metFORMIN (GLUCOPHAGE) 500 MG tablet TAKE 3 TABLETS BY MOUTH IN THE AM AND 2 TABS IN THE PM. 450 tablet 1     olopatadine (PATANOL) 0.1 % ophthalmic solution Place 1 drop into both eyes 2 times daily 5 mL 3     LORazepam (ATIVAN) 0.5 MG tablet 0.5 mg as needed Reported on 5/1/2017  2      "blood glucose monitoring (ACCU-CHEK MULTICLIX) lancets Testing twice daily. 1 Box 12     fluticasone (FLOVENT HFA) 110 MCG/ACT inhaler Inhale 2 puffs into the lungs 2 times daily 3 Inhaler 1     albuterol (ALBUTEROL) 108 (90 BASE) MCG/ACT inhaler Inhale 2 puffs into the lungs every 6 hours 1 Inhaler 0     Escitalopram Oxalate (LEXAPRO PO) Take 40 mg by mouth daily.       VITAMIN D 1000 UNIT PO CAPS 1 CAPSULE DAILY       LUNESTA 3 MG OR TABS 1 TABLET AT BEDTIME PRN 0 0     UNKNOWN MED DOSAGE FOLIC ACID- 1 TABLET DAILY 0 0     MULTI-VITAMIN OR TABS 1 TAB PO QD 0 0     [DISCONTINUED] metoprolol (LOPRESSOR) 100 MG tablet Take 1 tablet (100 mg) by mouth 2 times daily 180 tablet 1     [DISCONTINUED] lisinopril-hydrochlorothiazide (PRINZIDE,ZESTORETIC) 10-12.5 MG per tablet Take 2 tablets by mouth daily 180 tablet 3     Allergies   Allergen Reactions     Hmg-Coa-R Inhibitors Other (See Comments) and Muscle Pain (Myalgia)     Muscle pains     Meperidine      Percocet [Oxycodone-Acetaminophen]      Family history (mother would get a rash)     Prednisone Other (See Comments)     Night sweats     Trazodone Itching     BP Readings from Last 3 Encounters:   07/21/17 128/80   07/06/17 120/82   05/01/17 120/82    Wt Readings from Last 3 Encounters:   07/21/17 255 lb (115.7 kg)   07/06/17 249 lb (112.9 kg)   05/01/17 251 lb (113.9 kg)                  Labs reviewed in EPIC        Reviewed and updated as needed this visit by clinical staffTobacco  Allergies  Meds  Med Hx  Surg Hx  Fam Hx  Soc Hx      Reviewed and updated as needed this visit by Provider         ROS:  Constitutional, HEENT, cardiovascular, pulmonary, gi and gu systems are negative, except as otherwise noted.      OBJECTIVE:   /80  Pulse 93  Temp 97.3  F (36.3  C) (Oral)  Resp 14  Ht 5' 6.75\" (1.695 m)  Wt 255 lb (115.7 kg)  LMP 06/30/2016  SpO2 95%  Breastfeeding? No  BMI 40.24 kg/m2  Body mass index is 40.24 kg/(m^2).  GENERAL: healthy, alert " and no distress  RESP: lungs clear to auscultation - no rales, rhonchi or wheezes  CV: regular rate and rhythm, normal S1 S2, no S3 or S4, no murmur, click or rub, no peripheral edema and peripheral pulses strong  MS: Left shoulder: bicep tender to palpation; decreased range of motion all;  Positive Neer's, negative Hawkin's, Positive Speed's, Positive painful arc, no Pop-Eye deformity noted.      Diagnostic Test Results:  none     ASSESSMENT/PLAN:     1. Acute pain of left shoulder  Likely impingement syndrome and biceps tendonitis.  If no improvement with physical therapy patient to contact clinic for referral to Orthopedics.  - [facility] PT, HAND, AND CHIROPRACTIC REFERRAL  - HYDROcodone-acetaminophen (NORCO) 5-325 MG per tablet; Take 1 tablet by mouth daily as needed for pain  Dispense: 40 tablet; Refill: 0    2. Essential hypertension  Stable.  Continue current treatment plan and medications.    - lisinopril-hydrochlorothiazide (PRINZIDE/ZESTORETIC) 10-12.5 MG per tablet; Take 2 tablets by mouth daily  Dispense: 180 tablet; Refill: 1  - metoprolol (LOPRESSOR) 100 MG tablet; Take 1 tablet (100 mg) by mouth 2 times daily  Dispense: 180 tablet; Refill: 1    3. Uncontrolled type 2 diabetes mellitus with complication, with long-term current use of insulin (H)    - STATIN NOT PRESCRIBED, INTENTIONAL,; 1 each daily Please choose reason not prescribed, below    FUTURE APPOINTMENTS:       - Follow-up for annual visit or as needed    [facility]

## 2017-07-21 NOTE — PATIENT INSTRUCTIONS
[facility]    If you have any questions regarding to your visit please contact your care team:     Team Pink:   Clinic Hours Telephone Number   Internal Medicine:  Dr. Alix Mariee NP       7am-7pm  Monday - Thursday   7am-5pm  Fridays  [phone]  (Appointment scheduling available 24/7)    Questions about your visit?  Team Line  [phone]   Urgent Care - [place] and [facility] - 11am-9pm Monday-Friday Saturday-Sunday- 9am-5pm   [place] - 5pm-9pm Monday-Friday Saturday-Sunday- 9am-5pm  [phone] - [place]   [phone] - [place]       What options do I have for visits at the clinic other than the traditional office visit?  To expand how we care for you, many of our providers are utilizing electronic visits (e-visits) and telephone visits, when medically appropriate, for interactions with their patients rather than a visit in the clinic.   We also offer nurse visits for many medical concerns. Just like any other service, we will bill your insurance company for this type of visit based on time spent on the phone with your provider. Not all insurance companies cover these visits. Please check with your medical insurance if this type of visit is covered. You will be responsible for any charges that are not paid by your insurance.      E-visits via [email or web address]:  generally incur a $35.00 fee.  Telephone visits:  Time spent on the phone: *charged based on time that is spent on the phone in increments of 10 minutes. Estimated cost:   5-10 mins $30.00   11-20 mins. $59.00   21-30 mins. $85.00   Use Crescentratingt (secure email communication and access to your chart) to send your primary care provider a message or make an appointment. Ask someone on your Team how to sign up for [email or web address].    For a Price Quote for your services, please call our Consumer Price Line at [phone].    As always, Thank you for trusting us with your health care  needs!    Discharged by RONNY Coronado

## 2017-07-21 NOTE — MR AVS SNAPSHOT
After Visit Summary   7/21/2017    Coty Myles    MRN: [id number]           Patient Information     Date Of Birth          1970        Visit Information        Provider Department      7/21/2017 10:40 AM Jacy Mariee APRN [facility]        Today's Diagnoses     Acute pain of left shoulder    -  1    Essential hypertension        Uncontrolled type 2 diabetes mellitus with complication, with long-term current use of insulin (H)          Care Instructions    [facility]    If you have any questions regarding to your visit please contact your care team:     Team Pink:   Clinic Hours Telephone Number   Internal Medicine:  Dr. Alix Mariee, NP       7am-7pm  Monday - Thursday   7am-5pm  Fridays  [phone]  (Appointment scheduling available 24/7)    Questions about your visit?  Team Line  [phone]   Urgent Care - [facility] and [facility] - 11am-9pm Monday-Friday Saturday-Sunday- 9am-5pm   [place] - 5pm-9pm Monday-Friday Saturday-Sunday- 9am-5pm  [phone] - [facility]  [phone] - [place]       What options do I have for visits at the clinic other than the traditional office visit?  To expand how we care for you, many of our providers are utilizing electronic visits (e-visits) and telephone visits, when medically appropriate, for interactions with their patients rather than a visit in the clinic.   We also offer nurse visits for many medical concerns. Just like any other service, we will bill your insurance company for this type of visit based on time spent on the phone with your provider. Not all insurance companies cover these visits. Please check with your medical insurance if this type of visit is covered. You will be responsible for any charges that are not paid by your insurance.      E-visits via LSEO:  generally incur a $35.00 fee.  Telephone visits:  Time spent on the phone:  *charged based on time that is spent on the phone in increments of 10 minutes. Estimated cost:   5-10 mins $30.00   11-20 mins. $59.00   21-30 mins. $85.00   Use Bioceptivehart (secure email communication and access to your chart) to send your primary care provider a message or make an appointment. Ask someone on your Team how to sign up for Glad to Have Yout.    For a Price Quote for your services, please call our kaleo Price Line at [phone].    As always, Thank you for trusting us with your health care needs!    Discharged by RONNY Coronado            Follow-ups after your visit        Additional Services     [facility] PT, HAND, AND CHIROPRACTIC REFERRAL       **This order will print in the [facility] Scheduling Office**    Physical Therapy, Hand Therapy and Chiropractic Care are available through:    *[facility]  *[facility]  *[facility]    Call one number to schedule at any of the above locations: [phone].    Your provider has referred you to: Physical Therapy at [facility] or [facility]    Indication/Reason for Referral: Shoulder Pain  Onset of Illness:   Therapy Orders: Evaluate and Treat  Special Programs: None  Special Request: None    Sharron Mcdermott      Additional Comments for the Therapist or Chiropractor:     Please be aware that coverage of these services is subject to the terms and limitations of your health insurance plan.  Call member services at your health plan with any benefit or coverage questions.      Please bring the following to your appointment:    *Your personal calendar for scheduling future appointments  *Comfortable clothing                  Who to contact     If you have questions or need follow up information about today's clinic visit or your schedule please contact [facility] directly at [phone].  Normal or non-critical lab and imaging results will be communicated to you by MyChart, letter or phone within 4 business days after the clinic  "has received the results. If you do not hear from us within 7 days, please contact the clinic through TheTakes or phone. If you have a critical or abnormal lab result, we will notify you by phone as soon as possible.  Submit refill requests through TheTakes or call your pharmacy and they will forward the refill request to us. Please allow 3 business days for your refill to be completed.          Additional Information About Your Visit        TheTakes Information     TheTakes gives you secure access to your electronic health record. If you see a primary care provider, you can also send messages to your care team and make appointments. If you have questions, please call your primary care clinic.  If you do not have a primary care provider, please call [phone] and they will assist you.        Care EveryWhere ID     This is your Care EveryWhere ID. This could be used by other organizations to access your [facility] medical records  [id number]        Your Vitals Were     Pulse Temperature Respirations Height Last Period Pulse Oximetry    93 97.3  F (36.3  C) (Oral) 14 5' 6.75\" (1.695 m) 06/30/2016 95%    Breastfeeding? BMI (Body Mass Index)                No 40.24 kg/m2           Blood Pressure from Last 3 Encounters:   07/21/17 128/80   07/06/17 120/82   05/01/17 120/82    Weight from Last 3 Encounters:   07/21/17 255 lb (115.7 kg)   07/06/17 249 lb (112.9 kg)   05/01/17 251 lb (113.9 kg)              We Performed the Following     [facility] PT, HAND, AND CHIROPRACTIC REFERRAL          Today's Medication Changes          These changes are accurate as of: 7/21/17 11:21 AM.  If you have any questions, ask your nurse or doctor.               Start taking these medicines.        Dose/Directions    STATIN NOT PRESCRIBED (INTENTIONAL)   Used for:  Uncontrolled type 2 diabetes mellitus with complication, with long-term current use of insulin (H)   Started by:  Jacy Mariee APRN CNP        Dose:  1 each   1 each daily " Please choose reason not prescribed, below   Refills:  0            Where to get your medicines      These medications were sent to [facility] [id number] [facility] [employer] - [place], [place] - [address]  [address] [address]     Phone:  [phone]     lisinopril-hydrochlorothiazide 10-12.5 MG per tablet    metoprolol 100 MG tablet         Some of these will need a paper prescription and others can be bought over the counter.  Ask your nurse if you have questions.     Bring a paper prescription for each of these medications     HYDROcodone-acetaminophen 5-325 MG per tablet       You don't need a prescription for these medications     STATIN NOT PRESCRIBED (INTENTIONAL)                Primary Care Provider Office Phone # Fax #    Graciela Gates  [phone]       [facility] [address] [address]        Equal Access to Services     ORI JORGE : Brandi palacios Soniru, waaxda luqadaha, qaybta kaalmada adesivakumaryakaris, pati benjamin . So [facility] [phone].    ATENCIÓN: Si habla español, tiene a garcia disposición servicios gratuitos de asistencia lingüística. Ngoc al [phone].    We comply with applicable federal civil rights laws and [place] laws. We do not discriminate on the basis of race, color, national origin, age, disability sex, sexual orientation or gender identity.            Thank you!     Thank you for choosing [facility]  for your care. Our goal is always to provide you with excellent care. Hearing back from our patients is one way we can continue to improve our services. Please take a few minutes to complete the written survey that you may receive in the mail after your visit with us. Thank you!             Your Updated Medication List - Protect others around you: Learn how to safely use, store and throw away your medicines at www.disposemymeds.org.          This list is accurate  as of: 7/21/17 11:21 AM.  Always use your most recent med list.                   Brand Name Dispense Instructions for use Diagnosis    albuterol 108 (90 BASE) MCG/ACT Inhaler    albuterol    1 Inhaler    Inhale 2 puffs into the lungs every 6 hours    Intermittent asthma, uncomplicated       BD FRANCOISE U/F 32G X 4 MM   Generic drug:  insulin pen needle     200 each    USE TWICE A DAY    Type 2 diabetes mellitus (H)       blood glucose monitoring lancets     1 Box    Testing twice daily.    Type 1 diabetes mellitus, uncontrolled (H)       blood glucose monitoring meter device kit    no brand specified    1 kit    Use to test blood sugar 2-3 times daily or as directed.    Uncontrolled type 2 diabetes mellitus with complication, with long-term current use of insulin (H)       blood glucose monitoring test strip    no brand specified    1 Box    2-3 times daily testing    Type 2 diabetes mellitus with complication, with long-term current use of insulin (H)       exenatide ER 2 MG recon vial kit susp for weekly inj    BYDUREON    1 kit    Inject 2 mg Subcutaneous every 7 days    Uncontrolled type 2 diabetes mellitus with complication, with long-term current use of insulin (H)       fluticasone 110 MCG/ACT Inhaler    FLOVENT HFA    3 Inhaler    Inhale 2 puffs into the lungs 2 times daily    Morbid obesity, unspecified obesity type (H)       HYDROcodone-acetaminophen 5-325 MG per tablet    NORCO    40 tablet    Take 1 tablet by mouth daily as needed for pain    Acute pain of left shoulder       insulin lispro prot & lispro injection    HumaLOG MIX 75/25 KWIKPEN    45 mL    50 Units twice daily    Type 2 diabetes mellitus with complication, with long-term current use of insulin (H)       levothyroxine 88 MCG tablet    SYNTHROID/LEVOTHROID    90 tablet    Take 1 tablet (88 mcg) by mouth daily    Hypertension goal BP (blood pressure) < 130/80       LEXAPRO PO      Take 40 mg by mouth daily.        lisinopril-hydrochlorothiazide  10-12.5 MG per tablet    PRINZIDE/ZESTORETIC    180 tablet    Take 2 tablets by mouth daily    Essential hypertension       LORazepam 0.5 MG tablet    ATIVAN     0.5 mg as needed Reported on 5/1/2017        LUNESTA 3 MG tablet   Generic drug:  eszopiclone     0    1 TABLET AT BEDTIME PRN        metFORMIN 500 MG tablet    GLUCOPHAGE    450 tablet    TAKE 3 TABLETS BY MOUTH IN THE AM AND 2 TABS IN THE PM.    Type 2 diabetes mellitus (H)       metoprolol 100 MG tablet    LOPRESSOR    180 tablet    Take 1 tablet (100 mg) by mouth 2 times daily    Essential hypertension       Multi-vitamin Tabs tablet   Generic drug:  multivitamin, therapeutic with minerals     0    1 TAB PO QD        olopatadine 0.1 % ophthalmic solution    PATANOL    5 mL    Place 1 drop into both eyes 2 times daily    Conjunctivitis, allergic, bilateral       STATIN NOT PRESCRIBED (INTENTIONAL)      1 each daily Please choose reason not prescribed, below    Uncontrolled type 2 diabetes mellitus with complication, with long-term current use of insulin (H)       UNKNOWN MED DOSAGE     0    FOLIC ACID- 1 TABLET DAILY    OTC -PATIENT CHOICE       vitamin D 1000 UNITS capsule      1 CAPSULE DAILY

## 2017-07-21 NOTE — NURSING NOTE
"Chief Complaint   Patient presents with     Shoulder Pain     left shoulder     Musculoskeletal Problem     left arm       Initial /80  Pulse 93  Temp 97.3  F (36.3  C) (Oral)  Resp 14  Ht 5' 6.75\" (1.695 m)  Wt 255 lb (115.7 kg)  LMP 06/30/2016  SpO2 95%  Breastfeeding? No  BMI 40.24 kg/m2 Estimated body mass index is 40.24 kg/(m^2) as calculated from the following:    Height as of this encounter: 5' 6.75\" (1.695 m).    Weight as of this encounter: 255 lb (115.7 kg).  Medication Reconciliation: complete   Megan Faulkner CMA (AAMA)      "

## 2017-07-22 ASSESSMENT — ASTHMA QUESTIONNAIRES: ACT_TOTALSCORE: 20

## 2017-07-27 ENCOUNTER — THERAPY VISIT (OUTPATIENT)
Dept: PHYSICAL THERAPY | Facility: CLINIC | Age: 47
End: 2017-07-27
Payer: COMMERCIAL

## 2017-07-27 DIAGNOSIS — M25.512 ACUTE PAIN OF LEFT SHOULDER: Primary | ICD-10-CM

## 2017-07-27 PROCEDURE — 97161 PT EVAL LOW COMPLEX 20 MIN: CPT | Mod: GP | Performed by: PHYSICAL THERAPIST

## 2017-07-27 PROCEDURE — 97110 THERAPEUTIC EXERCISES: CPT | Mod: GP | Performed by: PHYSICAL THERAPIST

## 2017-07-27 PROCEDURE — 97112 NEUROMUSCULAR REEDUCATION: CPT | Mod: GP | Performed by: PHYSICAL THERAPIST

## 2017-07-27 NOTE — LETTER
[facility] ATHLETIC [facility] PHYSICAL THERAPY  [address]  [facility] [address]  [phone]    2017    Re: Coty Myles   :   1970  MRN:  [id number]   REFERRING PHYSICIAN:   Jacy Mariee    [facility]THLETIC [facility] PHYSICAL [facility]    Date of Initial Evaluation:  2017  Visits:  Rxs Used: 1  Reason for Referral:  Acute pain of left shoulder    EVALUATION SUMMARY    Subjective:  Patient is a 47 year old female presenting with rehab left shoulder hpi.   Coty Myles is a 47 year old female with a left shoulder condition. This is a new condition  17 saw MD for L shoulder pain that began 6-8 months ago.  About 2-3 weeks pain got so bad that was in tears.   Patient reports pain:  Upper arm and lateral. Pain is described as sharp and aching and is intermittent and reported as 9/10.  Associated symptoms:  Tingling and loss of motion/stiffness (tingling L 2nd digit, HA's L neck/SO region). Pain is worse in the P.M..  Symptoms are exacerbated by using arm behind back, lying on extremity, lifting and using arm at shoulder level (putting bra on, reaching back for seatbelt, pain after washing hair, putting shirt on , reaching out to the side) and relieved by rest (stretching). Since onset symptoms are gradually improving. General health as reported by patient is fair.  Pertinent medical history includes:  Asthma, diabetes, overweight, thyroid problems and depression (L hand dominant, has stand up desk at work, dyslexia, history of migraines (carmelo- menopausal).  History of L neck pain).  Medical allergies: no.  Other surgeries include:  Orthopedic surgery (R knee surgery ).  Medication history: diabetes meds, lisinopril, lorazepam, lunesta, metoprolol, albuterol.  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    Employment tasks: insurance, plays games with people, computer work.  Barriers include:   None as reported by the patient.  Red flags:  Pain at rest/night.    Objective:  Flexibility/Screens:   Positive screens:  Cervical (+) spurling quadrant testing R facet impingement with flex +R rotation.  (+) for  L UT region pain with flexion and extension with L rotation.  Notes has had neck pain for a long time, feels different than shoulder/arm pain       Shoulder Evaluation:  ROM:  AROM:    Flexion:  Left:  110 +p    Right:  140  Extension: Left: 40 +pRight: 55  Re: Coty Myles   :   1970    Abduction:  Left: 120 +ERP and on decent   Right:  165  External Rotation:  Left:  50 +p    Right:  68  Extension/Internal Rotation:  Left:  Thumb to L buttock + pain    Right:  T8    Strength:  : MMT supraspinatus empty can L 4-/5 + pain, R 5-/5, full can L 4+/5 less pain, R 5/5.  Flexion: Left:4/5 Weak/painful    Pain:    Right: 5-/5     Pain:   Abduction:  Left: 3-/5  Weak/painful  Pain:    Right: 4+/5     Pain:  Internal Rotation:  Left:4+/5   Weak/pain free    Pain:    Right: 5/5     Pain:  External Rotation:   Left:3+/5   Weak/painful    Pain:   Right:4/5     Pain:    Elbow Flexion:  Left:4/5     Pain:    Right:5-/5     Pain:  Elbow Extension:  Left:4/5     Pain:    Right:5-/5     Pain:  Special Tests:    Left shoulder positive for the following special tests:  Impingement (+) ginger garcia  (-) horizontal ADD testing    Assessment/Plan:    Patient is a 47 year old female with left side shoulder complaints.    Patient has the following significant findings with corresponding treatment plan.                Diagnosis 1:  Acute L shoulder pain  Pain -  hot/cold therapy, US, manual therapy, self management, education and home program  Decreased ROM/flexibility - manual therapy, therapeutic exercise and home program  Decreased strength - therapeutic exercise, therapeutic activities and home program  Inflammation - cold therapy and self management/home program  Decreased function - therapeutic activities and home  program  Impaired posture - neuro re-education and home program    Therapy Evaluation Codes:   1) History comprised of:   Personal factors that impact the plan of care:      Past/current experiences and Time since onset of symptoms.    Comorbidity factors that impact the plan of care are:      Migraines/headaches, Pain at night/rest and Weakness.     Medications impacting care: None.  2) Examination of Body Systems comprised of:   Body structures and functions that impact the plan of care:      Cervical spine and Shoulder.   Activity limitations that impact the plan of care are:      Bathing, Cooking, Dressing, Lifting, Sleeping, Laying down and reaching.  3) Clinical presentation characteristics are:   Stable/Uncomplicated.  4) Decision-Making    Low complexity using standardized patient assessment instrument and/or measureable assessment of functional outcome.  Cumulative Therapy Evaluation is: Low complexity.    Previous and current functional limitations:  (See Goal Flow Sheet for this information)    Short term and Long term goals: (See Goal Flow Sheet for this information)       Re: Coty Myles   :   1970    Communication ability:  Patient appears to be able to clearly communicate and understand verbal and written communication and follow directions correctly.  Treatment Explanation - The following has been discussed with the patient:   RX ordered/plan of care  Anticipated outcomes  Possible risks and side effects  This patient would benefit from PT intervention to resume normal activities.   Rehab potential is good.    Frequency:  1 X week, once daily  Duration:  for 6 weeks  Discharge Plan:  Achieve all LTG.  Independent in home treatment program.  Reach maximal therapeutic benefit.    Thank you for your referral.    INQUIRIES  Therapist: TASHA RobinT  [facility] PH[facility]ICAL [facility]ERAP[facility]  [address] [address]  Phone:  [phone]  Fax: [phone]

## 2017-07-27 NOTE — MR AVS SNAPSHOT
After Visit Summary   7/27/2017    Coty Myles    MRN: [id number]           Patient Information     Date Of Birth          1970        Visit Information        Provider Department      7/27/2017 3:20 PM Kate Ewing PT [facility]        Today's Diagnoses     Acute pain of left shoulder    -  1       Follow-ups after your visit        Your next 10 appointments already scheduled     Aug 01, 2017  8:10 AM CDT   MALINI Extremity with Kate Ewing PT   [facility] ([facility])    [address] [place] [address] [address]  [facility] [address]   [phone]            Aug 08, 2017  8:10 AM CDT   MALINI Extremity with Kate Ewing PT   [facility] ([facility])    [address] [place] [address] [address] [place] [address]   [phone]              Who to contact     If you have questions or need follow up information about today's clinic visit or your schedule please contact [facility] ATHLETIC [facility] PHYSICAL THERAPY directly at [phone].  Normal or non-critical lab and imaging results will be communicated to you by Airseedhart, letter or phone within 4 business days after the clinic has received the results. If you do not hear from us within 7 days, please contact the clinic through Airseedhart or phone. If you have a critical or abnormal lab result, we will notify you by phone as soon as possible.  Submit refill requests through CeQur or call your pharmacy and they will forward the refill request to us. Please allow 3 business days for your refill to be completed.          Additional Information About Your Visit        MyChart Information     CeQur gives you secure access to your electronic health record. If you see a primary care provider, you can also send messages to your care team and make appointments.  If you have questions, please call your primary care clinic.  If you do not have a primary care provider, please call [phone] and they will assist you.        Care EveryWhere ID     This is your Care EveryWhere ID. This could be used by other organizations to access your [facility] medical records  [id number]        Your Vitals Were     Last Period                   06/30/2016            Blood Pressure from Last 3 Encounters:   07/21/17 128/80   07/06/17 120/82   05/01/17 120/82    Weight from Last 3 Encounters:   07/21/17 115.7 kg (255 lb)   07/06/17 112.9 kg (249 lb)   05/01/17 113.9 kg (251 lb)              We Performed the Following     HC PT EVAL, LOW COMPLEXITY     MALINI INITIAL EVAL REPORT     NEUROMUSCULAR RE-EDUCATION     THERAPEUTIC EXERCISES        Primary Care Provider Office Phone # Fax #    Graciela Gates [phone]       [address] [address]        Equal Access to Services     ORI JORGE AH: Hadii aad ku hadasho Soomaali, waaxda luqadaha, qaybta kaalmada adeegyada, waxay idiin haycarmel renner. So [facility] [phone].    ATENCIÓN: Si habla español, tiene a garcia disposición servicios gratuitos de asistencia lingüística. Llame al [phone].    We comply with applicable federal civil rights laws and [place] laws. We do not discriminate on the basis of race, color, national origin, age, disability sex, sexual orientation or gender identity.            Thank you!     Thank you for choosing [facility] PHYSICAL THERAPY  for your care. Our goal is always to provide you with excellent care. Hearing back from our patients is one way we can continue to improve our services. Please take a few minutes to complete the written survey that you may receive in the mail after your visit with us. Thank you!             Your Updated Medication List - Protect others around you: Learn how to  safely use, store and throw away your medicines at www.disposemymeds.org.          This list is accurate as of: 7/27/17  9:15 PM.  Always use your most recent med list.                   Brand Name Dispense Instructions for use Diagnosis    albuterol 108 (90 BASE) MCG/ACT Inhaler    albuterol    1 Inhaler    Inhale 2 puffs into the lungs every 6 hours    Intermittent asthma, uncomplicated       BD FRANCOISE U/F 32G X 4 MM   Generic drug:  insulin pen needle     200 each    USE TWICE A DAY    Type 2 diabetes mellitus (H)       blood glucose monitoring lancets     1 Box    Testing twice daily.    Type 1 diabetes mellitus, uncontrolled (H)       blood glucose monitoring meter device kit    no brand specified    1 kit    Use to test blood sugar 2-3 times daily or as directed.    Uncontrolled type 2 diabetes mellitus with complication, with long-term current use of insulin (H)       blood glucose monitoring test strip    no brand specified    1 Box    2-3 times daily testing    Type 2 diabetes mellitus with complication, with long-term current use of insulin (H)       exenatide ER 2 MG recon vial kit susp for weekly inj    BYDUREON    1 kit    Inject 2 mg Subcutaneous every 7 days    Uncontrolled type 2 diabetes mellitus with complication, with long-term current use of insulin (H)       fluticasone 110 MCG/ACT Inhaler    FLOVENT HFA    3 Inhaler    Inhale 2 puffs into the lungs 2 times daily    Morbid obesity, unspecified obesity type (H)       HYDROcodone-acetaminophen 5-325 MG per tablet    NORCO    40 tablet    Take 1 tablet by mouth daily as needed for pain    Acute pain of left shoulder       insulin lispro prot & lispro injection    HumaLOG MIX 75/25 KWIKPEN    45 mL    50 Units twice daily    Type 2 diabetes mellitus with complication, with long-term current use of insulin (H)       levothyroxine 88 MCG tablet    SYNTHROID/LEVOTHROID    90 tablet    Take 1 tablet (88 mcg) by mouth daily    Hypertension goal BP (blood  pressure) < 130/80       LEXAPRO PO      Take 40 mg by mouth daily.        lisinopril-hydrochlorothiazide 10-12.5 MG per tablet    PRINZIDE/ZESTORETIC    180 tablet    Take 2 tablets by mouth daily    Essential hypertension       LORazepam 0.5 MG tablet    ATIVAN     0.5 mg as needed Reported on 5/1/2017        LUNESTA 3 MG tablet   Generic drug:  eszopiclone     0    1 TABLET AT BEDTIME PRN        metFORMIN 500 MG tablet    GLUCOPHAGE    450 tablet    TAKE 3 TABLETS BY MOUTH IN THE AM AND 2 TABS IN THE PM.    Type 2 diabetes mellitus (H)       metoprolol 100 MG tablet    LOPRESSOR    180 tablet    Take 1 tablet (100 mg) by mouth 2 times daily    Essential hypertension       Multi-vitamin Tabs tablet   Generic drug:  multivitamin, therapeutic with minerals     0    1 TAB PO QD        olopatadine 0.1 % ophthalmic solution    PATANOL    5 mL    Place 1 drop into both eyes 2 times daily    Conjunctivitis, allergic, bilateral       STATIN NOT PRESCRIBED (INTENTIONAL)      1 each daily Please choose reason not prescribed, below    Uncontrolled type 2 diabetes mellitus with complication, with long-term current use of insulin (H)       UNKNOWN MED DOSAGE     0    FOLIC ACID- 1 TABLET DAILY    OTC -PATIENT CHOICE       vitamin D 1000 UNITS capsule      1 CAPSULE DAILY

## 2017-07-27 NOTE — PROGRESS NOTES
[facility] Initial Evaluation      Subjective:    Patient is a 47 year old female presenting with rehab left shoulder hpi.   Coty Myles is a 47 year old female with a left shoulder condition.      This is a new condition  7/21/17 saw MD for L shoulder pain that began 6-8 months ago.  About 2-3 weeks pain got so bad that was in tears. .    Patient reports pain:  Upper arm and lateral.    Pain is described as sharp and aching and is intermittent and reported as 9/10.  Associated symptoms:  Tingling and loss of motion/stiffness (tingling L 2nd digit, HA's L neck/SO region). Pain is worse in the P.M..  Symptoms are exacerbated by using arm behind back, lying on extremity, lifting and using arm at shoulder level (putting bra on, reaching back for seatbelt, pain after washing hair, putting shirt on , reaching out to the side) and relieved by rest (stretching).  Since onset symptoms are gradually improving.        General health as reported by patient is fair.  Pertinent medical history includes:  Asthma, diabetes, overweight, thyroid problems and depression (L hand dominant, has stand up desk at work, dyslexia, history of migraines (carmelo- menopausal).  History of L neck pain).  Medical allergies: no.  Other surgeries include:  Orthopedic surgery (R knee surgery 1980's).  Medication history: diabetes meds, lisinopril, lorazepam, lunesta, metoprolol, albuterol.  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    Employment tasks: insurance, plays games with people, computer work.    Barriers include:  None as reported by the patient.    Red flags:  Pain at rest/night.                        Objective:          Flexibility/Screens:   Positive screens:  Cervical (+) spurling quadrant testing R facet impingement with flex +R rotation.  (+) for  L UT region pain with flexion and extension with L rotation.  Notes has had neck pain for a long time, feels different than shoulder/arm  pain                             Shoulder Evaluation:  ROM:  AROM:    Flexion:  Left:  110 +p    Right:  140  Extension: Left: 40 +pRight: 55  Abduction:  Left: 120 +ERP and on decent   Right:  165      External Rotation:  Left:  50 +p    Right:  68            Extension/Internal Rotation:  Left:  Thumb to L buttock + pain    Right:  T8          Strength:  : MMT supraspinatus empty can L 4-/5 + pain, R 5-/5, full can L 4+/5 less pain, R 5/5.  Flexion: Left:4/5 Weak/painful    Pain:    Right: 5-/5     Pain:     Abduction:  Left: 3-/5  Weak/painful  Pain:    Right: 4+/5     Pain:    Internal Rotation:  Left:4+/5   Weak/pain free    Pain:    Right: 5/5     Pain:  External Rotation:   Left:3+/5   Weak/painful    Pain:   Right:4/5     Pain:        Elbow Flexion:  Left:4/5     Pain:    Right:5-/5     Pain:  Elbow Extension:  Left:4/5     Pain:    Right:5-/5     Pain:    Special Tests:    Left shoulder positive for the following special tests:  Impingement (+) ginger garcia  (-) horizontal ADD testing                                         General     ROS    Assessment/Plan:      Patient is a 47 year old female with left side shoulder complaints.    Patient has the following significant findings with corresponding treatment plan.                Diagnosis 1:  Acute L shoulder pain    Pain -  hot/cold therapy, US, manual therapy, self management, education and home program  Decreased ROM/flexibility - manual therapy, therapeutic exercise and home program  Decreased strength - therapeutic exercise, therapeutic activities and home program  Inflammation - cold therapy and self management/home program  Decreased function - therapeutic activities and home program  Impaired posture - neuro re-education and home program    Therapy Evaluation Codes:   1) History comprised of:   Personal factors that impact the plan of care:      Past/current experiences and Time since onset of symptoms.    Comorbidity factors that impact the plan of  care are:      Migraines/headaches, Pain at night/rest and Weakness.     Medications impacting care: None.  2) Examination of Body Systems comprised of:   Body structures and functions that impact the plan of care:      Cervical spine and Shoulder.   Activity limitations that impact the plan of care are:      Bathing, Cooking, Dressing, Lifting, Sleeping, Laying down and reaching.  3) Clinical presentation characteristics are:   Stable/Uncomplicated.  4) Decision-Making    Low complexity using standardized patient assessment instrument and/or measureable assessment of functional outcome.  Cumulative Therapy Evaluation is: Low complexity.    Previous and current functional limitations:  (See Goal Flow Sheet for this information)    Short term and Long term goals: (See Goal Flow Sheet for this information)     Communication ability:  Patient appears to be able to clearly communicate and understand verbal and written communication and follow directions correctly.  Treatment Explanation - The following has been discussed with the patient:   RX ordered/plan of care  Anticipated outcomes  Possible risks and side effects  This patient would benefit from PT intervention to resume normal activities.   Rehab potential is good.    Frequency:  1 X week, once daily  Duration:  for 6 weeks  Discharge Plan:  Achieve all LTG.  Independent in home treatment program.  Reach maximal therapeutic benefit.    Please refer to the daily flowsheet for treatment today, total treatment time and time spent performing 1:1 timed codes.

## 2017-07-31 DIAGNOSIS — Z79.4 TYPE 2 DIABETES MELLITUS WITH COMPLICATION, WITH LONG-TERM CURRENT USE OF INSULIN (H): ICD-10-CM

## 2017-07-31 DIAGNOSIS — E11.8 TYPE 2 DIABETES MELLITUS WITH COMPLICATION, WITH LONG-TERM CURRENT USE OF INSULIN (H): ICD-10-CM

## 2017-07-31 RX ORDER — INSULIN LISPRO 100 [IU]/ML
INJECTION, SUSPENSION SUBCUTANEOUS
Qty: 45 ML | Refills: 0 | Status: SHIPPED | OUTPATIENT
Start: 2017-07-31 | End: 2017-10-06

## 2017-07-31 NOTE — TELEPHONE ENCOUNTER
Prescription approved per [facility] Refill Protocol.     Raquel Doyle RN

## 2017-07-31 NOTE — TELEPHONE ENCOUNTER
insulin lispro prot & lispro (HUMALOG MIX 75/25 KWIKPEN) injection    3 ordered  Edit     Summary: 50 Units twice daily, Disp-45 mL, R-3, E-Prescribe   Start: 2017  Ord/Sold: 2017 (O)  Report  Taking:   Long-term:   Pharmacy: [id number] [place] [facility] - [place], [place] - [address] [place] [address] BLVD  Med Dose History       Patient Si Units twice daily       Ordered on: 2017       Authorized by: CHAI BRAUN       Dispense: 45 mL       Admin Instructions: 50 Units twice daily       Prior Authorization: Request PA          Last Office Visit with [facility], [facility] or [facility] prescribing provider:  2017        BP Readings from Last 3 Encounters:   17 128/80   17 120/82   17 120/82     Lab Results   Component Value Date    MICROL 6 2014     Lab Results   Component Value Date    UMALCR 4.51 2014     Creatinine   Date Value Ref Range Status   2017 0.87 0.52 - 1.04 mg/dL Final   ]  GFR Estimate   Date Value Ref Range Status   2017 69 >60 mL/min/1.7m2 Final     Comment:     Non  GFR Calc   2017 56 (L) >60 mL/min/1.7m2 Final     Comment:     Non  GFR Calc   2017 53 (L) >60 mL/min/1.7m2 Final     Comment:     Non  GFR Calc     GFR Estimate If Black   Date Value Ref Range Status   2017 83 >60 mL/min/1.7m2 Final     Comment:      GFR Calc   2017 67 >60 mL/min/1.7m2 Final     Comment:      GFR Calc   2017 64 >60 mL/min/1.7m2 Final     Comment:      GFR Calc     Lab Results   Component Value Date    CHOL 189 2017     Lab Results   Component Value Date    HDL 45 2017     Lab Results   Component Value Date    LDL  2017     Cannot estimate LDL when triglyceride exceeds 400 mg/dL    LDL 92 2017     Lab Results   Component Value Date    TRIG 429 2017     Lab Results   Component Value Date    CHOLHDLRATIO 2.1 2015     Lab  Results   Component Value Date    AST 34 01/31/2013     Lab Results   Component Value Date    ALT 33 01/31/2013     Lab Results   Component Value Date    A1C 8.8 07/06/2017    A1C 9.7 05/01/2017    A1C 9.8 02/27/2017    A1C 10.3 08/19/2016    A1C 10.3 04/27/2016     Potassium   Date Value Ref Range Status   07/06/2017 3.7 3.4 - 5.3 mmol/L Final

## 2017-08-01 ENCOUNTER — OFFICE VISIT (OUTPATIENT)
Dept: FAMILY MEDICINE | Facility: CLINIC | Age: 47
End: 2017-08-01
Payer: COMMERCIAL

## 2017-08-01 VITALS
TEMPERATURE: 97.3 F | BODY MASS INDEX: 39.76 KG/M2 | DIASTOLIC BLOOD PRESSURE: 76 MMHG | HEART RATE: 96 BPM | OXYGEN SATURATION: 94 % | SYSTOLIC BLOOD PRESSURE: 113 MMHG | WEIGHT: 252 LBS

## 2017-08-01 DIAGNOSIS — J45.901 ASTHMA EXACERBATION: ICD-10-CM

## 2017-08-01 DIAGNOSIS — H10.13 CONJUNCTIVITIS, ALLERGIC, BILATERAL: ICD-10-CM

## 2017-08-01 DIAGNOSIS — J06.9 URI, ACUTE: Primary | ICD-10-CM

## 2017-08-01 PROCEDURE — 99214 OFFICE O/P EST MOD 30 MIN: CPT | Performed by: PHYSICIAN ASSISTANT

## 2017-08-01 RX ORDER — FLUTICASONE PROPIONATE 50 MCG
1-2 SPRAY, SUSPENSION (ML) NASAL DAILY
Qty: 1 BOTTLE | Refills: 11 | Status: SHIPPED | OUTPATIENT
Start: 2017-08-01 | End: 2018-04-02

## 2017-08-01 RX ORDER — OLOPATADINE HYDROCHLORIDE 1 MG/ML
SOLUTION/ DROPS OPHTHALMIC
Qty: 5 ML | Refills: 3 | Status: SHIPPED | OUTPATIENT
Start: 2017-08-01 | End: 2018-01-01

## 2017-08-01 RX ORDER — PREDNISONE 20 MG/1
40 TABLET ORAL DAILY
Qty: 10 TABLET | Refills: 0 | Status: SHIPPED | OUTPATIENT
Start: 2017-08-01 | End: 2017-08-06

## 2017-08-01 NOTE — NURSING NOTE
"Chief Complaint   Patient presents with     Cough       Initial /76 (BP Location: Left arm, Patient Position: Chair, Cuff Size: Adult Large)  Pulse 96  Temp 97.3  F (36.3  C) (Oral)  Wt 252 lb (114.3 kg)  LMP 06/30/2016  SpO2 94%  BMI 39.76 kg/m2 Estimated body mass index is 39.76 kg/(m^2) as calculated from the following:    Height as of 7/21/17: 5' 6.75\" (1.695 m).    Weight as of this encounter: 252 lb (114.3 kg).  Medication Reconciliation: complete   Zee Rankin MA      "

## 2017-08-01 NOTE — TELEPHONE ENCOUNTER
Mike      Last Written Prescription Date: 2/15/17  Last Fill Quantity: 5ml,  # refills: 3   Last Office Visit with [facility], [facility] or [facility] prescribing provider: 8/1/17                                               Prescription approved per [facility] Refill Protocol.  Delia Begum RN

## 2017-08-01 NOTE — MR AVS SNAPSHOT
After Visit Summary   8/1/2017    Coty Myles    MRN: [id number]           Patient Information     Date Of Birth          1970        Visit Information        Provider Department      8/1/2017 11:20 AM Татьяна Alcaraz PA-C [facility]        Today's Diagnoses     URI, acute    -  1    Asthma exacerbation          Care Instructions    Try the nasal spray and prednisone  If cough worsens or you get a fever or facial pain worsens call or return to clinic               Follow-ups after your visit        Your next 10 appointments already scheduled     Aug 08, 2017  8:10 AM CDT   MALINI Extremity with Kate Ewing PT   [facility]    [address]  [facility] [address] [phone]              Who to contact     If you have questions or need follow up information about today's clinic visit or your schedule please contact [facility] directly at [phone].  Normal or non-critical lab and imaging results will be communicated to you by [employer], letter or phone within 4 business days after the clinic has received the results. If you do not hear from us within 7 days, please contact the clinic through [employer] or phone. If you have a critical or abnormal lab result, we will notify you by phone as soon as possible.  Submit refill requests through [employer] or call your pharmacy and they will forward the refill request to us. Please allow 3 business days for your refill to be completed.          Additional Information About Your Visit        MyChart Information     [employer] gives you secure access to your electronic health record. If you see a primary care provider, you can also send messages to your care team and make appointments. If you have questions, please call your primary care clinic.  If you do not have a primary care provider, please call  [phone] and they will assist you.        Care EveryWhere ID     This is your Care EveryWhere ID. This could be used by other organizations to access your [facility] medical records  [id number]        Your Vitals Were     Pulse Temperature Last Period Pulse Oximetry BMI (Body Mass Index)       96 97.3  F (36.3  C) (Oral) 06/30/2016 94% 39.76 kg/m2        Blood Pressure from Last 3 Encounters:   08/01/17 113/76   07/21/17 128/80   07/06/17 120/82    Weight from Last 3 Encounters:   08/01/17 252 lb (114.3 kg)   07/21/17 255 lb (115.7 kg)   07/06/17 249 lb (112.9 kg)              Today, you had the following     No orders found for display         Today's Medication Changes          These changes are accurate as of: 8/1/17 11:51 AM.  If you have any questions, ask your nurse or doctor.               Start taking these medicines.        Dose/Directions    fluticasone 50 MCG/ACT spray   Commonly known as:  FLONASE   Used for:  URI, acute   Started by:  Татьяна Alcaraz PA-C        Dose:  1-2 spray   Spray 1-2 sprays into both nostrils daily   Quantity:  1 Bottle   Refills:  11       predniSONE 20 MG tablet   Commonly known as:  DELTASONE   Used for:  URI, acute, Asthma exacerbation   Started by:  Татьяна Alcaraz PA-C        Dose:  40 mg   Take 2 tablets (40 mg) by mouth daily for 5 days   Quantity:  10 tablet   Refills:  0            Where to get your medicines      These medications were sent to [place] [id number] [place] [facility] [place], [place] - [address]  [address] [address]     Phone:  [phone]     fluticasone 50 MCG/ACT spray    predniSONE 20 MG tablet                Primary Care Provider Office Phone # Fax #    Graciela Brenna Gates [phone]       [address] [address]        Equal Access to Services     ORI JORGE AH: Brandi Katz, billy michel, derrick escobar,  pati cardenascarmel moise'aan ah. So [facility] [phone].    ATENCIÓN: Si habla shelley, tiene a garcia disposición servicios gratuitos de asistencia lingüística. Ngoc lee [phone].    We comply with applicable federal civil rights laws and [place] laws. We do not discriminate on the basis of race, color, national origin, age, disability sex, sexual orientation or gender identity.            Thank you!     Thank you for choosing [facility]  for your care. Our goal is always to provide you with excellent care. Hearing back from our patients is one way we can continue to improve our services. Please take a few minutes to complete the written survey that you may receive in the mail after your visit with us. Thank you!             Your Updated Medication List - Protect others around you: Learn how to safely use, store and throw away your medicines at www.disposemymeds.org.          This list is accurate as of: 8/1/17 11:51 AM.  Always use your most recent med list.                   Brand Name Dispense Instructions for use Diagnosis    albuterol 108 (90 BASE) MCG/ACT Inhaler    albuterol    1 Inhaler    Inhale 2 puffs into the lungs every 6 hours    Intermittent asthma, uncomplicated       BD FRANCOISE U/F 32G X 4 MM   Generic drug:  insulin pen needle     200 each    USE TWICE A DAY    Type 2 diabetes mellitus (H)       blood glucose monitoring lancets     1 Box    Testing twice daily.    Type 1 diabetes mellitus, uncontrolled (H)       blood glucose monitoring meter device kit    no brand specified    1 kit    Use to test blood sugar 2-3 times daily or as directed.    Uncontrolled type 2 diabetes mellitus with complication, with long-term current use of insulin (H)       blood glucose monitoring test strip    no brand specified    1 Box    2-3 times daily testing    Type 2 diabetes mellitus with complication, with long-term current use of insulin (H)       exenatide ER 2 MG recon vial kit  susp for weekly inj    BYDUREON    1 kit    Inject 2 mg Subcutaneous every 7 days    Uncontrolled type 2 diabetes mellitus with complication, with long-term current use of insulin (H)       fluticasone 110 MCG/ACT Inhaler    FLOVENT HFA    3 Inhaler    Inhale 2 puffs into the lungs 2 times daily    Morbid obesity, unspecified obesity type (H)       fluticasone 50 MCG/ACT spray    FLONASE    1 Bottle    Spray 1-2 sprays into both nostrils daily    URI, acute       HumaLOG MIX 75/25 KWIKPEN injection   Generic drug:  insulin lispro prot & lispro     45 mL    INJECT 48 UNITS UNDER THE SKIN TWICE DAILY.    Type 2 diabetes mellitus with complication, with long-term current use of insulin (H)       HYDROcodone-acetaminophen 5-325 MG per tablet    NORCO    40 tablet    Take 1 tablet by mouth daily as needed for pain    Acute pain of left shoulder       levothyroxine 88 MCG tablet    SYNTHROID/LEVOTHROID    90 tablet    Take 1 tablet (88 mcg) by mouth daily    Hypertension goal BP (blood pressure) < 130/80       LEXAPRO PO      Take 40 mg by mouth daily.        lisinopril-hydrochlorothiazide 10-12.5 MG per tablet    PRINZIDE/ZESTORETIC    180 tablet    Take 2 tablets by mouth daily    Essential hypertension       LORazepam 0.5 MG tablet    ATIVAN     0.5 mg as needed Reported on 5/1/2017        LUNESTA 3 MG tablet   Generic drug:  eszopiclone     0    1 TABLET AT BEDTIME PRN        metFORMIN 500 MG tablet    GLUCOPHAGE    450 tablet    TAKE 3 TABLETS BY MOUTH IN THE AM AND 2 TABS IN THE PM.    Type 2 diabetes mellitus (H)       metoprolol 100 MG tablet    LOPRESSOR    180 tablet    Take 1 tablet (100 mg) by mouth 2 times daily    Essential hypertension       Multi-vitamin Tabs tablet   Generic drug:  multivitamin, therapeutic with minerals     0    1 TAB PO QD        olopatadine 0.1 % ophthalmic solution    PATANOL    5 mL    Place 1 drop into both eyes 2 times daily    Conjunctivitis, allergic, bilateral       predniSONE  20 MG tablet    DELTASONE    10 tablet    Take 2 tablets (40 mg) by mouth daily for 5 days    URI, acute, Asthma exacerbation       STATIN NOT PRESCRIBED (INTENTIONAL)      1 each daily Please choose reason not prescribed, below    Uncontrolled type 2 diabetes mellitus with complication, with long-term current use of insulin (H)       UNKNOWN MED DOSAGE     0    FOLIC ACID- 1 TABLET DAILY    OTC -PATIENT CHOICE       vitamin D 1000 UNITS capsule      1 CAPSULE DAILY

## 2017-08-01 NOTE — PATIENT INSTRUCTIONS
Try the nasal spray and prednisone  If cough worsens or you get a fever or facial pain worsens call or return to clinic

## 2017-08-01 NOTE — PROGRESS NOTES
SUBJECTIVE:                                                    Coty Myles is a 47 year old female who presents to clinic today for the following health issues:      ENT Symptoms             Symptoms: cc Present Absent Comment   Fever/Chills   x    Fatigue  x     Muscle Aches  x     Eye Irritation   x    Sneezing   x    Nasal Luciano/Drg  x     Sinus Pressure/Pain   x    Loss of smell   x    Dental pain   x    Sore Throat  x  little   Swollen Glands   x    Ear Pain/Fullness   x    Cough  x     Wheeze  x     Chest Pain   x    Shortness of breath  x     Rash       Other         Symptom duration:  1 week   Symptom severity:  moderate   Treatments tried:  hot showers, liquid intake   Contacts:  yes-woks with the public      Using flovent and albuterol.  Both help.    Cough worse at night   Hx of asthma       Problem list and histories reviewed & adjusted, as indicated.  Additional history: as documented    Patient Active Problem List   Diagnosis     Attention deficit disorder     Sciatica     Hypothyroidism     iamLUMBOSACRAL NEURITIS NOS     Hyperlipidemia LDL goal <100     Hypertension goal BP (blood pressure) < 130/80     Moderate major depression (H)     Family history of ischemic heart disease     Intermittent asthma     ASCUS with positive high risk HPV cervical     Morbid obesity (H)     Headache     Uncontrolled type 2 diabetes mellitus with complication (H)     Acute pain of left shoulder     Past Surgical History:   Procedure Laterality Date     ENT SURGERY       ORTHOPEDIC SURGERY       SURGICAL HISTORY OF -   1990    right knee arthroscopic     SURGICAL HISTORY OF -   2000    lump on neck removed       Social History   Substance Use Topics     Smoking status: Never Smoker     Smokeless tobacco: Never Used     Alcohol use Yes      Comment: 3 to 4 per year     Family History   Problem Relation Age of Onset     HEART DISEASE Father      heart attack at 64     DIABETES Father      C.A.D. Father      DIABETES  Paternal Grandmother      CANCER Paternal Grandfather      Lung CA     DIABETES Maternal Grandmother      DIABETES Mother      Gynecology Mother      hysterectomy- ovarian cyst     Respiratory Mother      COPD- dependent on oxygen, passed away at age 69     Allergies Mother      to percocet     DIABETES Paternal Aunt      DIABETES Paternal Uncle      Alcohol/Drug Paternal Uncle      DIABETES Brother      CEREBROVASCULAR DISEASE No family hx of              Reviewed and updated as needed this visit by clinical staffTobacco  Allergies  Med Hx  Surg Hx  Fam Hx  Soc Hx      Reviewed and updated as needed this visit by Provider         ROS:  As above    OBJECTIVE:     /76 (BP Location: Left arm, Patient Position: Chair, Cuff Size: Adult Large)  Pulse 96  Temp 97.3  F (36.3  C) (Oral)  Wt 252 lb (114.3 kg)  LMP 06/30/2016  SpO2 94%  BMI 39.76 kg/m2  Body mass index is 39.76 kg/(m^2).  GENERAL: alert, no distress and obese  HENT: ear canals and TM's normal, oropharynx clear and oral mucous membranes moist  NECK: no adenopathy and no asymmetry, masses, or scars  RESP: lungs clear to auscultation - no rales, rhonchi or wheezes  CV: regular rates and rhythm, normal S1 S2, no S3 or S4 and no murmur, click or rub    Diagnostic Test Results:  none     ASSESSMENT/PLAN:       1. URI, acute  Pt has never had prednisone-allergy is from her mom.  Ok to try.  Did warn of worsening sugars.  Use short course.  If this and flonase doesn't improve symptoms consider antibiotics .    - fluticasone (FLONASE) 50 MCG/ACT spray; Spray 1-2 sprays into both nostrils daily  Dispense: 1 Bottle; Refill: 11  - predniSONE (DELTASONE) 20 MG tablet; Take 2 tablets (40 mg) by mouth daily for 5 days  Dispense: 10 tablet; Refill: 0    2. Asthma exacerbation  As above  - predniSONE (DELTASONE) 20 MG tablet; Take 2 tablets (40 mg) by mouth daily for 5 days  Dispense: 10 tablet; Refill: 0    FUTURE APPOINTMENTS:       - Follow-up for annual  visit or as needed    Татьяна Alcaraz PA-C  [facility]

## 2017-08-08 PROBLEM — M25.512 ACUTE PAIN OF LEFT SHOULDER: Status: RESOLVED | Noted: 2017-07-27 | Resolved: 2017-08-08

## 2017-08-08 NOTE — PROGRESS NOTES
Please refer to initial evaluation for D/C report as patient never returned to therapy.  Patient no showed for 2 appointments after eval.

## 2017-08-25 NOTE — TELEPHONE ENCOUNTER
exenatide ER (BYDUREON) 2 MG recon vial kit susp for weekly inj   2 mg, EVERY 7 DAYS 1 ordered  Reorder     Summary: Inject 2 mg Subcutaneous every 7 days, Disp-1 kit, R-1, E-Prescribe  Profile Rx: patient will contact pharmacy when needed     Dose, Route, Frequency: 2 mg, Subcutaneous, EVERY 7 DAYS  Start: 7/6/2017  Ord/Sold: 7/6/2017 (O)  Report  Long-term:   Pharmacy: [place] [id number] [place] [address]  Med Dose History  ChangeDiscontinue       Patient Sig: Inject 2 mg Subcutaneous every 7 days       Ordered on: 7/6/2017       Authorized by: CHAI BRAUN       Dispense: 1 kit       Med Comments: Profile Rx: patient will contact pharmacy when needed       Prior Authorization: Request PA          Last Office Visit with OCTAVIA, MARTITA or [facility] prescribing provider:  8-1-2017        BP Readings from Last 3 Encounters:   08/01/17 113/76   07/21/17 128/80   07/06/17 120/82     Lab Results   Component Value Date    MICROL 6 01/24/2014     Lab Results   Component Value Date    UMALCR 4.51 01/24/2014     Creatinine   Date Value Ref Range Status   07/06/2017 0.87 0.52 - 1.04 mg/dL Final   ]  GFR Estimate   Date Value Ref Range Status   07/06/2017 69 >60 mL/min/1.7m2 Final     Comment:     Non  GFR Calc   05/01/2017 56 (L) >60 mL/min/1.7m2 Final     Comment:     Non  GFR Calc   02/27/2017 53 (L) >60 mL/min/1.7m2 Final     Comment:     Non  GFR Calc     GFR Estimate If Black   Date Value Ref Range Status   07/06/2017 83 >60 mL/min/1.7m2 Final     Comment:      GFR Calc   05/01/2017 67 >60 mL/min/1.7m2 Final     Comment:      GFR Calc   02/27/2017 64 >60 mL/min/1.7m2 Final     Comment:      GFR Calc     Lab Results   Component Value Date    CHOL 189 05/01/2017     Lab Results   Component Value Date    HDL 45 05/01/2017     Lab Results   Component Value Date    LDL  05/01/2017     Cannot estimate  LDL when triglyceride exceeds 400 mg/dL    LDL 92 05/01/2017     Lab Results   Component Value Date    TRIG 429 05/01/2017     Lab Results   Component Value Date    CHOLHDLRATIO 2.1 08/28/2015     Lab Results   Component Value Date    AST 34 01/31/2013     Lab Results   Component Value Date    ALT 33 01/31/2013     Lab Results   Component Value Date    A1C 8.8 07/06/2017    A1C 9.7 05/01/2017    A1C 9.8 02/27/2017    A1C 10.3 08/19/2016    A1C 10.3 04/27/2016     Potassium   Date Value Ref Range Status   07/06/2017 3.7 3.4 - 5.3 mmol/L Final

## 2017-09-15 DIAGNOSIS — E11.9 TYPE 2 DIABETES MELLITUS (H): ICD-10-CM

## 2017-09-15 NOTE — TELEPHONE ENCOUNTER
metFORMIN (GLUCOPHAGE) 500 MG tablet    1 ordered  Edit     Summary: TAKE 3 TABLETS BY MOUTH IN THE AM AND 2 TABS IN THE PM., Disp-450 tablet, R-1, E-Prescribe   Start: 3/21/2017  Ord/Sold: 3/21/2017 (O)  Report  Taking:   Long-term:   Pharmacy: [facility] [id number] [place] [facility] [place], [place] - [address] [place] [address] BLVD  Med Dose History       Patient Sig: TAKE 3 TABLETS BY MOUTH IN THE AM AND 2 TABS IN THE PM.       Ordered on: 3/21/2017       Authorized by: MALKA HERNANDEZ       Dispense: 450 tablet          Last Office Visit with G, P or [facility] prescribing provider:  8-1-2017        BP Readings from Last 3 Encounters:   08/01/17 113/76   07/21/17 128/80   07/06/17 120/82     Lab Results   Component Value Date    MICROL 6 01/24/2014     Lab Results   Component Value Date    UMALCR 4.51 01/24/2014     Creatinine   Date Value Ref Range Status   07/06/2017 0.87 0.52 - 1.04 mg/dL Final   ]  GFR Estimate   Date Value Ref Range Status   07/06/2017 69 >60 mL/min/1.7m2 Final     Comment:     Non  GFR Calc   05/01/2017 56 (L) >60 mL/min/1.7m2 Final     Comment:     Non  GFR Calc   02/27/2017 53 (L) >60 mL/min/1.7m2 Final     Comment:     Non  GFR Calc     GFR Estimate If Black   Date Value Ref Range Status   07/06/2017 83 >60 mL/min/1.7m2 Final     Comment:      GFR Calc   05/01/2017 67 >60 mL/min/1.7m2 Final     Comment:      GFR Calc   02/27/2017 64 >60 mL/min/1.7m2 Final     Comment:      GFR Calc     Lab Results   Component Value Date    CHOL 189 05/01/2017     Lab Results   Component Value Date    HDL 45 05/01/2017     Lab Results   Component Value Date    LDL  05/01/2017     Cannot estimate LDL when triglyceride exceeds 400 mg/dL    LDL 92 05/01/2017     Lab Results   Component Value Date    TRIG 429 05/01/2017     Lab Results   Component Value Date    CHOLHDLRATIO 2.1 08/28/2015     Lab Results   Component Value Date     AST 34 01/31/2013     Lab Results   Component Value Date    ALT 33 01/31/2013     Lab Results   Component Value Date    A1C 8.8 07/06/2017    A1C 9.7 05/01/2017    A1C 9.8 02/27/2017    A1C 10.3 08/19/2016    A1C 10.3 04/27/2016     Potassium   Date Value Ref Range Status   07/06/2017 3.7 3.4 - 5.3 mmol/L Final

## 2017-09-19 NOTE — TELEPHONE ENCOUNTER
BYDUREON 2 MG recon vial kit susp for weekly inj    0 ordered  Edit     Summary: INJECT 2 MG SUBCUTANEOUS EVERY 7 DAYS, Disp-4 mL, R-0, E-Prescribe   Start: 8/25/2017  Ord/Sold: 8/25/2017 (O)  Report  Taking:   Long-term:   Pharmacy: [facility] [id number] [place] [facility] [place], [place] - [address]  Med Dose History       Patient Sig: INJECT 2 MG SUBCUTANEOUS EVERY 7 DAYS       Ordered on: 8/25/2017       Authorized by: MALKA HERNANDEZ       Dispense: 4 mL       Prior Authorization: Request PA          Last Office Visit with [facility], [facility] or [facility] prescribing provider:  8-1-2017        BP Readings from Last 3 Encounters:   08/01/17 113/76   07/21/17 128/80   07/06/17 120/82     Lab Results   Component Value Date    MICROL 6 01/24/2014     Lab Results   Component Value Date    UMALCR 4.51 01/24/2014     Creatinine   Date Value Ref Range Status   07/06/2017 0.87 0.52 - 1.04 mg/dL Final   ]  GFR Estimate   Date Value Ref Range Status   07/06/2017 69 >60 mL/min/1.7m2 Final     Comment:     Non  GFR Calc   05/01/2017 56 (L) >60 mL/min/1.7m2 Final     Comment:     Non  GFR Calc   02/27/2017 53 (L) >60 mL/min/1.7m2 Final     Comment:     Non  GFR Calc     GFR Estimate If Black   Date Value Ref Range Status   07/06/2017 83 >60 mL/min/1.7m2 Final     Comment:      GFR Calc   05/01/2017 67 >60 mL/min/1.7m2 Final     Comment:      GFR Calc   02/27/2017 64 >60 mL/min/1.7m2 Final     Comment:      GFR Calc     Lab Results   Component Value Date    CHOL 189 05/01/2017     Lab Results   Component Value Date    HDL 45 05/01/2017     Lab Results   Component Value Date    LDL  05/01/2017     Cannot estimate LDL when triglyceride exceeds 400 mg/dL    LDL 92 05/01/2017     Lab Results   Component Value Date    TRIG 429 05/01/2017     Lab Results   Component Value Date    CHOLHDLRATIO 2.1 08/28/2015     Lab Results   Component Value Date     AST 34 01/31/2013     Lab Results   Component Value Date    ALT 33 01/31/2013     Lab Results   Component Value Date    A1C 8.8 07/06/2017    A1C 9.7 05/01/2017    A1C 9.8 02/27/2017    A1C 10.3 08/19/2016    A1C 10.3 04/27/2016     Potassium   Date Value Ref Range Status   07/06/2017 3.7 3.4 - 5.3 mmol/L Final

## 2017-09-30 DIAGNOSIS — E11.9 TYPE 2 DIABETES MELLITUS (H): ICD-10-CM

## 2017-10-02 NOTE — TELEPHONE ENCOUNTER
BD Dania needles     Last Written Prescription Date: 06/30/17  Last Fill Quantity: 200, # refills: 0  Last Office Visit with [facility], [facility] or  Health prescribing provider:  08/1/17 Sharonka   Next 5 appointments (look out 90 days)     Oct 06, 2017  8:40 AM CDT   Office Visit with CLOVER Ramirez CNP   [facility]    [address] [address] [phone]                   BP Readings from Last 3 Encounters:   08/01/17 113/76   07/21/17 128/80   07/06/17 120/82     Lab Results   Component Value Date    MICROL 6 01/24/2014     Lab Results   Component Value Date    UMALCR 4.51 01/24/2014     Creatinine   Date Value Ref Range Status   07/06/2017 0.87 0.52 - 1.04 mg/dL Final   ]  GFR Estimate   Date Value Ref Range Status   07/06/2017 69 >60 mL/min/1.7m2 Final     Comment:     Non  GFR Calc   05/01/2017 56 (L) >60 mL/min/1.7m2 Final     Comment:     Non  GFR Calc   02/27/2017 53 (L) >60 mL/min/1.7m2 Final     Comment:     Non  GFR Calc     GFR Estimate If Black   Date Value Ref Range Status   07/06/2017 83 >60 mL/min/1.7m2 Final     Comment:      GFR Calc   05/01/2017 67 >60 mL/min/1.7m2 Final     Comment:      GFR Calc   02/27/2017 64 >60 mL/min/1.7m2 Final     Comment:      GFR Calc     Lab Results   Component Value Date    CHOL 189 05/01/2017     Lab Results   Component Value Date    HDL 45 05/01/2017     Lab Results   Component Value Date    LDL  05/01/2017     Cannot estimate LDL when triglyceride exceeds 400 mg/dL    LDL 92 05/01/2017     Lab Results   Component Value Date    TRIG 429 05/01/2017     Lab Results   Component Value Date    CHOLHDLRATIO 2.1 08/28/2015     Lab Results   Component Value Date    AST 34 01/31/2013     Lab Results   Component Value Date    ALT 33 01/31/2013     Lab Results   Component Value Date     A1C 8.8 07/06/2017    A1C 9.7 05/01/2017    A1C 9.8 02/27/2017    A1C 10.3 08/19/2016    A1C 10.3 04/27/2016     Potassium   Date Value Ref Range Status   07/06/2017 3.7 3.4 - 5.3 mmol/L Final

## 2017-10-03 RX ORDER — PEN NEEDLE, DIABETIC 32GX 5/32"
NEEDLE, DISPOSABLE MISCELLANEOUS
Qty: 20100 EACH | Refills: 0 | Status: SHIPPED | OUTPATIENT
Start: 2017-10-03 | End: 2018-10-19

## 2017-10-06 ENCOUNTER — OFFICE VISIT (OUTPATIENT)
Dept: FAMILY MEDICINE | Facility: CLINIC | Age: 47
End: 2017-10-06
Payer: COMMERCIAL

## 2017-10-06 ENCOUNTER — TELEPHONE (OUTPATIENT)
Dept: PHARMACY | Facility: OTHER | Age: 47
End: 2017-10-06

## 2017-10-06 VITALS
TEMPERATURE: 97.7 F | DIASTOLIC BLOOD PRESSURE: 87 MMHG | HEART RATE: 83 BPM | SYSTOLIC BLOOD PRESSURE: 131 MMHG | OXYGEN SATURATION: 95 % | BODY MASS INDEX: 40.08 KG/M2 | WEIGHT: 254 LBS

## 2017-10-06 DIAGNOSIS — E11.8 TYPE 2 DIABETES MELLITUS WITH COMPLICATION, WITH LONG-TERM CURRENT USE OF INSULIN (H): ICD-10-CM

## 2017-10-06 DIAGNOSIS — M75.82 TENDINITIS OF LEFT ROTATOR CUFF: ICD-10-CM

## 2017-10-06 DIAGNOSIS — Z79.4 TYPE 2 DIABETES MELLITUS WITH COMPLICATION, WITH LONG-TERM CURRENT USE OF INSULIN (H): ICD-10-CM

## 2017-10-06 DIAGNOSIS — I10 HYPERTENSION GOAL BP (BLOOD PRESSURE) < 130/80: Primary | Chronic | ICD-10-CM

## 2017-10-06 DIAGNOSIS — E66.01 MORBID OBESITY (H): Chronic | ICD-10-CM

## 2017-10-06 DIAGNOSIS — E78.5 HYPERLIPIDEMIA LDL GOAL <100: ICD-10-CM

## 2017-10-06 LAB — HBA1C MFR BLD: 9.5 % (ref 4.3–6)

## 2017-10-06 PROCEDURE — 83036 HEMOGLOBIN GLYCOSYLATED A1C: CPT | Performed by: NURSE PRACTITIONER

## 2017-10-06 PROCEDURE — 99214 OFFICE O/P EST MOD 30 MIN: CPT | Performed by: NURSE PRACTITIONER

## 2017-10-06 PROCEDURE — 36415 COLL VENOUS BLD VENIPUNCTURE: CPT | Performed by: NURSE PRACTITIONER

## 2017-10-06 RX ORDER — GEMFIBROZIL 600 MG/1
600 TABLET, FILM COATED ORAL 2 TIMES DAILY
Qty: 60 TABLET | Refills: 1 | Status: CANCELLED | OUTPATIENT
Start: 2017-10-06

## 2017-10-06 RX ORDER — INSULIN LISPRO 100 [IU]/ML
INJECTION, SUSPENSION SUBCUTANEOUS
Qty: 45 ML | Refills: 3 | Status: SHIPPED | OUTPATIENT
Start: 2017-10-06 | End: 2018-07-28

## 2017-10-06 ASSESSMENT — PAIN SCALES - GENERAL: PAINLEVEL: NO PAIN (0)

## 2017-10-06 NOTE — MR AVS SNAPSHOT
After Visit Summary   10/6/2017    Coty Myles    MRN: [id number]           Patient Information     Date Of Birth          1970        Visit Information        Provider Department      10/6/2017 8:40 AM Anamaria Thomas APRN [facility]        Today's Diagnoses     Hypertension goal BP (blood pressure) < 130/80    -  1    Hyperlipidemia LDL goal <100        Uncontrolled type 2 diabetes mellitus with complication, with long-term current use of insulin (H)        Type 2 diabetes mellitus with complication, with long-term current use of insulin (H)        Tendinitis of left rotator cuff          Care Instructions      Understanding Rotator Cuff Tendonitis    Tendons are tough tissues that connect muscles to bone. A group of 4 muscles and their tendons form a  cuff  around the head of the upper arm bone. This is called the rotator cuff. It connects the upper arm to the shoulder blade. It gives the shoulder joint stability and strength.  If tendons are injured or strained, they may become irritated and swollen (inflamed). This is called tendonitis. Rotator cuff tendonitis may cause shoulder pain and loss of function.  What causes rotator cuff tendonitis?  Tendonitis results when the rotator cuff tendons are injured or overworked. The most common cause of injury is repetitive overhead activities. These can be work-related activities such as reaching, pushing, or lifting. Or they can be sports-related activities such as throwing, swimming, or lifting weights.  Symptoms of rotator cuff tendonitis  Pain on the side of the upper arm is the most common symptom. Pain may get worse with overhead movements or when you raise the arm above shoulder level. It may also hurt to lie on the shoulder at night.  Treatment for rotator cuff tendonitis  Treatment may include the following:    Active rest. This lets the rotator cuff heal. Active rest means using your arm and shoulder, but  avoiding activities that cause pain, such as reaching overhead or sleeping on the shoulder.    Cold packs. Putting ice packs on the shoulder helps reduce swelling and relieve pain.    Pain medicines. Prescription or over-the-counter pain medicines can help relieve pain and swelling.    Arm and shoulder exercises. These help keep the shoulder joint mobile as it heals. They also help improve the strength of muscles around the joint.  Possible complications  It might be tempting to stop using your shoulder completely to avoid pain. But doing so may lead to a condition called  frozen shoulder.  To help prevent this, following instructions you are given for active rest and for doing exercises to help your shoulder heal.  When to call your healthcare provider  Call your healthcare provider right away if you have any of these:    Fever of 100.4 F (38 C) or higher, or as directed    Symptoms that don t get better, or get worse    New symptoms   Date Last Reviewed: 3/10/2016    9144-0249 [employer]. [address] [place] [address]. All rights reserved. This information is not intended as a substitute for professional medical care. Always follow your healthcare professional's instructions.        Pendulum (Flexibility)    1. Lean over next to a table, with your left arm supporting your weight on the table.  2. Relax your right arm and let it hang straight down.  3. Slowly begin to swing your right arm in a small [place]. Gradually make the [place] bigger if you can. Change direction after 1 minute of motion.  4. Next, swing your right arm backward and forward. Then move it side to side. Change direction after 1 minute of motion.  5. Repeat these movements for about 5 minutes.  6. Do this exercise 3 times a day, or as often as instructed.  Date Last Reviewed: 3/10/2016    [id number] [employer]. [address] [place] [address]. All rights reserved. This information is not intended  as a substitute for professional medical care. Always follow your healthcare professional's instructions.                Follow-ups after your visit        Additional Services     [facility] PT, HAND, AND CHIROPRACTIC REFERRAL       **This order will print in the [facility] Scheduling Office**    Physical Therapy, Hand Therapy and Chiropractic Care are available through:    *[facility]  *[facility]  *[facility]    Call one number to schedule at any of the above locations: [phone].    Your provider has referred you to: Physical Therapy at [facility] or [facility]    Indication/Reason for Referral: Shoulder Pain  Onset of Illness: 3+ month history of left shoulder pain with reduced mobility. Suspect rotator cuff tedonitis  Therapy Orders: Evaluate and Treat  Special Programs: None  Special Request: None    Sharron Mcdermott      Additional Comments for the Therapist or Chiropractor: see above    Please be aware that coverage of these services is subject to the terms and limitations of your health insurance plan.  Call member services at your health plan with any benefit or coverage questions.      Please bring the following to your appointment:    *Your personal calendar for scheduling future appointments  *Comfortable clothing                  Your next 10 appointments already scheduled     Mar 05, 2018  9:00 AM CST   [facility]    [address] [address] [phone]              Future tests that were ordered for you today     Open Future Orders        Priority Expected Expires Ordered    Lipid Profile (Chol, Trig, HDL, LDL calc) Routine  10/6/2018 10/6/2017            Who to contact     If you have questions or need follow up information about today's clinic visit or your schedule please contact [facility] directly at [phone].  Normal  or non-critical lab and imaging results will be communicated to you by [employer], letter or phone within 4 business days after the clinic has received the results. If you do not hear from us within 7 days, please contact the clinic through [employer] or phone. If you have a critical or abnormal lab result, we will notify you by phone as soon as possible.  Submit refill requests through [employer] or call your pharmacy and they will forward the refill request to us. Please allow 3 business days for your refill to be completed.          Additional Information About Your Visit        [employer] Information     [employer] gives you secure access to your electronic health record. If you see a primary care provider, you can also send messages to your care team and make appointments. If you have questions, please call your primary care clinic.  If you do not have a primary care provider, please call [phone] and they will assist you.        Care EveryWhere ID     This is your Care EveryWhere ID. This could be used by other organizations to access your [place] medical records  [id number]        Your Vitals Were     Pulse Temperature Last Period Pulse Oximetry Breastfeeding? BMI (Body Mass Index)    83 97.7  F (36.5  C) (Oral) 06/30/2016 95% No 40.08 kg/m2       Blood Pressure from Last 3 Encounters:   10/06/17 131/87   08/01/17 113/76   07/21/17 128/80    Weight from Last 3 Encounters:   10/06/17 254 lb (115.2 kg)   08/01/17 252 lb (114.3 kg)   07/21/17 255 lb (115.7 kg)              We Performed the Following     Albumin Random Urine Quantitative with Creat Ratio     Hemoglobin A1c     [facility], AND CHIROPRACTIC REFERRAL          Today's Medication Changes          These changes are accurate as of: 10/6/17  9:21 AM.  If you have any questions, ask your nurse or doctor.               These medicines have changed or have updated prescriptions.        Dose/Directions    * exenatide ER 2 MG recon vial kit susp for weekly inj    Commonly known as:  BYDUREON   This may have changed:  Another medication with the same name was added. Make sure you understand how and when to take each.   Used for:  Uncontrolled type 2 diabetes mellitus with complication, with long-term current use of insulin (H)   Changed by:  Graciela Gates DO        Dose:  2 mg   Inject 2 mg Subcutaneous once a week Due for a visit for refills!   Quantity:  4 mL   Refills:  0       * exenatide ER 2 MG pen   Commonly known as:  BYDUREON   This may have changed:  You were already taking a medication with the same name, and this prescription was added. Make sure you understand how and when to take each.   Used for:  Type 2 diabetes mellitus with complication, with long-term current use of insulin (H)   Changed by:  Anamaria Thomas APRN CNP        Dose:  2 mg   Inject 2 mg Subcutaneous every 7 days   Quantity:  3 each   Refills:  3       insulin lispro prot & lispro injection   Commonly known as:  HumaLOG MIX 75/25 KWIKPEN   This may have changed:  See the new instructions.   Used for:  Type 2 diabetes mellitus with complication, with long-term current use of insulin (H)   Changed by:  Anamaria Thomas APRN CNP        INJECT 50 UNITS UNDER THE SKIN TWICE DAILY.   Quantity:  45 mL   Refills:  3       metFORMIN 500 MG tablet   Commonly known as:  GLUCOPHAGE   This may have changed:  See the new instructions.   Used for:  Type 2 diabetes mellitus with complication, with long-term current use of insulin (H)   Changed by:  Anamaria Thomas APRN CNP        TAKE 3 TABLETS BY MOUTH IN THE AM AND 2 TABS IN THE PM.   Quantity:  450 tablet   Refills:  0       * Notice:  This list has 2 medication(s) that are the same as other medications prescribed for you. Read the directions carefully, and ask your doctor or other care provider to review them with you.         Where to get your medicines      These medications were sent to [facility] [address] [facility] -  [place], [place] - [address]  [address] [facility] [address]     Phone:  [phone]     exenatide ER 2 MG pen    insulin lispro prot & lispro injection    metFORMIN 500 MG tablet                Primary Care Provider Office Phone # Fax CLOVER Bailey [phone]       [address] [facility] [address]        Equal Access to Services     ORI JORGE : Hadii aad ku hadasho Soomaali, waaxda luqadaha, qaybta kaalmada adeegyada, waxay idiin hayaan adeeg kharash la'carmel . So [facility] [phone].    ATENCIÓN: Si habla español, tiene a garcia disposición servicios gratuitos de asistencia lingüística. Llame al [phone].    We comply with applicable federal civil rights laws and [place] laws. We do not discriminate on the basis of race, color, national origin, age, disability, sex, sexual orientation, or gender identity.            Thank you!     Thank you for choosing [facility]  for your care. Our goal is always to provide you with excellent care. Hearing back from our patients is one way we can continue to improve our services. Please take a few minutes to complete the written survey that you may receive in the mail after your visit with us. Thank you!             Your Updated Medication List - Protect others around you: Learn how to safely use, store and throw away your medicines at www.disposemymeds.org.          This list is accurate as of: 10/6/17  9:21 AM.  Always use your most recent med list.                   Brand Name Dispense Instructions for use Diagnosis    albuterol 108 (90 BASE) MCG/ACT Inhaler    PROAIR HFA    1 Inhaler    Inhale 2 puffs into the lungs every 6 hours    Intermittent asthma, uncomplicated       BD FRANCOISE U/F 32G X 4 MM   Generic drug:  insulin pen needle     20100 each    USE TWICE DAILY    Type 2 diabetes mellitus (H)       blood glucose monitoring lancets     1 Box    Testing twice daily.     Type 1 diabetes mellitus, uncontrolled (H)       blood glucose monitoring meter device kit    no brand specified    1 kit    Use to test blood sugar 2-3 times daily or as directed.    Uncontrolled type 2 diabetes mellitus with complication, with long-term current use of insulin (H)       blood glucose monitoring test strip    no brand specified    1 Box    2-3 times daily testing    Type 2 diabetes mellitus with complication, with long-term current use of insulin (H)       * exenatide ER 2 MG recon vial kit susp for weekly inj    BYDUREON    4 mL    Inject 2 mg Subcutaneous once a week Due for a visit for refills!    Uncontrolled type 2 diabetes mellitus with complication, with long-term current use of insulin (H)       * exenatide ER 2 MG pen    BYDUREON    3 each    Inject 2 mg Subcutaneous every 7 days    Type 2 diabetes mellitus with complication, with long-term current use of insulin (H)       fluticasone 110 MCG/ACT Inhaler    FLOVENT HFA    3 Inhaler    Inhale 2 puffs into the lungs 2 times daily    Morbid obesity, unspecified obesity type (H)       fluticasone 50 MCG/ACT spray    FLONASE    1 Bottle    Spray 1-2 sprays into both nostrils daily    URI, acute       HYDROcodone-acetaminophen 5-325 MG per tablet    NORCO    40 tablet    Take 1 tablet by mouth daily as needed for pain    Acute pain of left shoulder       insulin lispro prot & lispro injection    HumaLOG MIX 75/25 KWIKPEN    45 mL    INJECT 50 UNITS UNDER THE SKIN TWICE DAILY.    Type 2 diabetes mellitus with complication, with long-term current use of insulin (H)       levothyroxine 88 MCG tablet    SYNTHROID/LEVOTHROID    90 tablet    Take 1 tablet (88 mcg) by mouth daily    Hypertension goal BP (blood pressure) < 130/80       LEXAPRO PO      Take 40 mg by mouth daily.        lisinopril-hydrochlorothiazide 10-12.5 MG per tablet    PRINZIDE/ZESTORETIC    180 tablet    Take 2 tablets by mouth daily    Essential hypertension       LORazepam 0.5 MG  tablet    ATIVAN     0.5 mg as needed Reported on 5/1/2017        LUNESTA 3 MG tablet   Generic drug:  eszopiclone     0    1 TABLET AT BEDTIME PRN        metFORMIN 500 MG tablet    GLUCOPHAGE    450 tablet    TAKE 3 TABLETS BY MOUTH IN THE AM AND 2 TABS IN THE PM.    Type 2 diabetes mellitus with complication, with long-term current use of insulin (H)       metoprolol 100 MG tablet    LOPRESSOR    180 tablet    Take 1 tablet (100 mg) by mouth 2 times daily    Essential hypertension       Multi-vitamin Tabs tablet   Generic drug:  multivitamin, therapeutic with minerals     0    1 TAB PO QD        olopatadine 0.1 % ophthalmic solution    PATANOL    5 mL    PLACE 1 DROP INTO BOTH EYES 2 TIMES DAILY    Conjunctivitis, allergic, bilateral       STATIN NOT PRESCRIBED (INTENTIONAL)      1 each daily Please choose reason not prescribed, below    Uncontrolled type 2 diabetes mellitus with complication, with long-term current use of insulin (H)       UNKNOWN MED DOSAGE     0    FOLIC ACID- 1 TABLET DAILY    OTC -PATIENT CHOICE       vitamin D 1000 UNITS capsule      1 CAPSULE DAILY        * Notice:  This list has 2 medication(s) that are the same as other medications prescribed for you. Read the directions carefully, and ask your doctor or other care provider to review them with you.

## 2017-10-06 NOTE — PATIENT INSTRUCTIONS
Understanding Rotator Cuff Tendonitis    Tendons are tough tissues that connect muscles to bone. A group of 4 muscles and their tendons form a  cuff  around the head of the upper arm bone. This is called the rotator cuff. It connects the upper arm to the shoulder blade. It gives the shoulder joint stability and strength.  If tendons are injured or strained, they may become irritated and swollen (inflamed). This is called tendonitis. Rotator cuff tendonitis may cause shoulder pain and loss of function.  What causes rotator cuff tendonitis?  Tendonitis results when the rotator cuff tendons are injured or overworked. The most common cause of injury is repetitive overhead activities. These can be work-related activities such as reaching, pushing, or lifting. Or they can be sports-related activities such as throwing, swimming, or lifting weights.  Symptoms of rotator cuff tendonitis  Pain on the side of the upper arm is the most common symptom. Pain may get worse with overhead movements or when you raise the arm above shoulder level. It may also hurt to lie on the shoulder at night.  Treatment for rotator cuff tendonitis  Treatment may include the following:    Active rest. This lets the rotator cuff heal. Active rest means using your arm and shoulder, but avoiding activities that cause pain, such as reaching overhead or sleeping on the shoulder.    Cold packs. Putting ice packs on the shoulder helps reduce swelling and relieve pain.    Pain medicines. Prescription or over-the-counter pain medicines can help relieve pain and swelling.    Arm and shoulder exercises. These help keep the shoulder joint mobile as it heals. They also help improve the strength of muscles around the joint.  Possible complications  It might be tempting to stop using your shoulder completely to avoid pain. But doing so may lead to a condition called  frozen shoulder.  To help prevent this, following instructions you are given for active rest  and for doing exercises to help your shoulder heal.  When to call your healthcare provider  Call your healthcare provider right away if you have any of these:    Fever of 100.4 F (38 C) or higher, or as directed    Symptoms that don t get better, or get worse    New symptoms   Date Last Reviewed: 3/10/2016    [id number] [employer]. [address] [place] [address]. All rights reserved. This information is not intended as a substitute for professional medical care. Always follow your healthcare professional's instructions.        Pendulum (Flexibility)    1. Lean over next to a table, with your left arm supporting your weight on the table.  2. Relax your right arm and let it hang straight down.  3. Slowly begin to swing your right arm in a small [place]. Gradually make the [place] bigger if you can. Change direction after 1 minute of motion.  4. Next, swing your right arm backward and forward. Then move it side to side. Change direction after 1 minute of motion.  5. Repeat these movements for about 5 minutes.  6. Do this exercise 3 times a day, or as often as instructed.  Date Last Reviewed: 3/10/2016    [id number] [employer]. [address] [place] [address]. All rights reserved. This information is not intended as a substitute for professional medical care. Always follow your healthcare professional's instructions.

## 2017-10-06 NOTE — PROGRESS NOTES
"  SUBJECTIVE:   Coty Myles is a 47 year old female who presents to clinic today for the following health issues:      Diabetes Follow-up      Patient is checking blood sugars: 1-2 times a week    Diabetic concerns: None     Symptoms of hypoglycemia (low blood sugar): none     Paresthesias (numbness or burning in feet) or sores: No     Date of last diabetic eye exam: up to date    Last A1c 8.8  Started on Bydureon  Not checking frequently. Maybe 1-2x/week.   AM sugars \"usually < 200\"    Hyperlipidemia Follow-Up      Rate your low fat/cholesterol diet?: fair    Taking statin?  No    Other lipid medications/supplements?:  None    Triglycerides elevated at last check: 429  She had previously stopped her statin d/t concern for muscle aches. We tried rosuvastatin, but again developed muscle aches      Hypertension Follow-up      Outpatient blood pressures are not being checked.    Low Salt Diet: low salt    Amount of exercise or physical activity: None    Problems taking medications regularly: No    Medication side effects: none  Diet: low salt and low fat/cholesterol    BP well controlled  Tolerating without side effects      Patient has 2 other issue to talk about if there is time  Headaches 1 weekly for years.  Thinks she has pinched nerves in her upper arms, limited range of motion and pain since more then a year slowly getting worse.    Worsening ROM bilateral shoulders  First noticed 2 months ago, but thinks it's been present longer  Works in SW  Denies injury  Left hand dominant  Denies paresthesias  Feels weaker d/t the shoulder pain      Problem list and histories reviewed & adjusted, as indicated.  Additional history: none    Patient Active Problem List   Diagnosis     Attention deficit disorder     Sciatica     Hypothyroidism     iamLUMBOSACRAL NEURITIS NOS     Hyperlipidemia LDL goal <100     Hypertension goal BP (blood pressure) < 130/80     Moderate major depression (H)     Family history of ischemic heart " disease     Intermittent asthma     ASCUS with positive high risk HPV cervical     Morbid obesity (H)     Headache     Uncontrolled type 2 diabetes mellitus with complication (H)     Past Surgical History:   Procedure Laterality Date     ENT SURGERY       ORTHOPEDIC SURGERY       SURGICAL HISTORY OF -   1990    right knee arthroscopic     SURGICAL HISTORY OF -   2000    lump on neck removed       Social History   Substance Use Topics     Smoking status: Never Smoker     Smokeless tobacco: Never Used     Alcohol use Yes      Comment: 3 to 4 per year     Family History   Problem Relation Age of Onset     HEART DISEASE Father      heart attack at 64     DIABETES Father      C.A.D. Father      DIABETES Paternal Grandmother      CANCER Paternal Grandfather      Lung CA     DIABETES Maternal Grandmother      DIABETES Mother      Gynecology Mother      hysterectomy- ovarian cyst     Respiratory Mother      COPD- dependent on oxygen, passed away at age 69     Allergies Mother      to percocet     DIABETES Paternal Aunt      DIABETES Paternal Uncle      Alcohol/Drug Paternal Uncle      DIABETES Brother      CEREBROVASCULAR DISEASE No family hx of              Reviewed and updated as needed this visit by clinical staffTobacco  Allergies  Meds  Med Hx  Surg Hx  Fam Hx  Soc Hx      Reviewed and updated as needed this visit by Provider         ROS:  Constitutional, HEENT, cardiovascular, pulmonary, gi and gu systems are negative, except as otherwise noted.      OBJECTIVE:   /87 (BP Location: Right arm, Patient Position: Chair, Cuff Size: Adult Large)  Pulse 83  Temp 97.7  F (36.5  C) (Oral)  Wt 254 lb (115.2 kg)  LMP 06/30/2016  SpO2 95%  Breastfeeding? No  BMI 40.08 kg/m2  Body mass index is 40.08 kg/(m^2).  GENERAL: healthy, alert and no distress  RESP: lungs clear to auscultation - no rales, rhonchi or wheezes  CV: regular rate and rhythm, normal S1 S2, no S3 or S4, no murmur, click or rub, no peripheral  edema and peripheral pulses strong  MSK: Pain to palpation left anterior shoulder joint. No masses. Reduced ROM. Abduction limited to 60 degrees and flexion limited to 90 d/t pain. Positive empty can test    Diagnostic Test Results:  Results for orders placed or performed in visit on 10/06/17 (from the past 24 hour(s))   Hemoglobin A1c   Result Value Ref Range    Hemoglobin A1C 9.5 (H) 4.3 - 6.0 %       ASSESSMENT/PLAN:   1. Hypertension goal BP (blood pressure) < 130/80  - Stable. Continue with current medications    2. Hyperlipidemia LDL goal <100  - Failed 2 different statins. Discussed alternative medications for hypertriglyidemia. She is resistive to this. Will recheck fasting lipids. If elevated, will start gemfibrozil  - Lipid Profile (Chol, Trig, HDL, LDL calc); Future    3. Morbid obesity (H)  - Contributing to poorly controlled diabetes, HTN and hyperlipidemia. Recommend healthy diet, exercise      4. Type 2 diabetes mellitus with complication, with long-term current use of insulin (H)  - A1c returned after clinic visit and is elevated. I suspected it would be elevated as patient is not checking blood sugars, and am readings on > 180. We discussed referral to MTM during clinic visit. Will call patient to notify. May also discuss treatment for elevated triglycerides (pending recent fasting lipids)  - Hemoglobin A1c  - Albumin Random Urine Quantitative with Creat Ratio  - exenatide ER (BYDUREON) 2 MG pen; Inject 2 mg Subcutaneous every 7 days  Dispense: 3 each; Refill: 3  - metFORMIN (GLUCOPHAGE) 500 MG tablet; TAKE 3 TABLETS BY MOUTH IN THE AM AND 2 TABS IN THE PM.  Dispense: 450 tablet; Refill: 0  - insulin lispro prot & lispro (HUMALOG MIX 75/25 KWIKPEN) injection; INJECT 50 UNITS UNDER THE SKIN TWICE DAILY.  Dispense: 45 mL; Refill: 3    5. Tendinitis of left rotator cuff  - Symptoms consistent with rotator cuff tendinopathy. Reviewed self cares and handout given. Recommend physical therapy. If minimal  improvement, consider injection for improved mobility which may make participation in physical therapy easier  - [facility] PT, HAND, AND CHIROPRACTIC REFERRAL    See Patient Instructions    CLOVER Gandhi CNP  [facility]

## 2017-10-06 NOTE — TELEPHONE ENCOUNTER
MTM referral from: [facility] visit (referral by provider)    MTM referral outreach attempt #1 on October 6, 2017 at 12:54 PM      Outcome: Left Message    Arti Proctor MTM Coordinator

## 2017-10-06 NOTE — PROGRESS NOTES
Coty Myles,    Your lab results have been released to [facility].   Your A1c went up to 9.5. I put in a referral for you to meet with our pharmacist for diabetes management as your A1c went up despite starting Bydureon since our last visit. I will have someone from our clinic call you to help you schedule this appointment.     Please call the clinic if you have any concerns [phone].    CLOVER Ganhdi [facility]

## 2017-10-09 NOTE — TELEPHONE ENCOUNTER
MTM referral from: [facility] visit (referral by provider)    MTM referral outreach attempt #2 on October 9, 2017 at 5:00 PM      Outcome: Patient not reachable after several attempts, will route to MTM Pharmacist/Provider as an FYI. Thank you for the referral.    Arti Proctor, MTM Coordinator

## 2017-10-18 ENCOUNTER — THERAPY VISIT (OUTPATIENT)
Dept: PHYSICAL THERAPY | Facility: CLINIC | Age: 47
End: 2017-10-18
Payer: COMMERCIAL

## 2017-10-18 DIAGNOSIS — M25.512 SHOULDER PAIN, LEFT: Primary | ICD-10-CM

## 2017-10-18 PROCEDURE — 97112 NEUROMUSCULAR REEDUCATION: CPT | Mod: GP | Performed by: PHYSICAL THERAPIST

## 2017-10-18 PROCEDURE — 97161 PT EVAL LOW COMPLEX 20 MIN: CPT | Mod: GP | Performed by: PHYSICAL THERAPIST

## 2017-10-18 PROCEDURE — 97110 THERAPEUTIC EXERCISES: CPT | Mod: GP | Performed by: PHYSICAL THERAPIST

## 2017-10-18 NOTE — MR AVS SNAPSHOT
After Visit Summary   10/18/2017    Coty Myles    MRN: [id number]           Patient Information     Date Of Birth          1970        Visit Information        Provider Department      10/18/2017 4:30 PM Noreen Mary PT [facility]        Today's Diagnoses     Shoulder pain, left    -  1       Follow-ups after your visit        Your next 10 appointments already scheduled     Oct 24, 2017  9:10 AM CDT   [facility]AL[facility]I Extremity with Noreen Mary PT   [facility] ([facility])    [address] [facility] [address]   [phone]            Oct 24, 2017  4:30 PM CDT   Office Visit with Caroline Ramos CANELO   [facility] [place] [facility] ([facility])    [address] [place] [id number] [phone]           Bring a current list of meds and any records pertaining to this visit. For Physicals, please bring immunization records and any forms needing to be filled out. Please arrive 10 minutes early to complete paperwork.            Oct 31, 2017  3:40 PM CDT   MALINI Extremity with Noreen Mary PT   [facility] ([facility])    [address] [facility] [address]   [phone]            Mar 05, 2018  9:00 AM CST   [facility] with Graciela Gates,    [facility]    [address] [place] [id number] [phone]              Who to contact     If you have questions or need follow up information about today's clinic visit or your schedule please contact [facility] directly at [phone].  Normal or non-critical lab and imaging results will be communicated to you by MyChart, letter or phone within 4 business days after the clinic has received the results. If you do not hear from us within 7  days, please contact the clinic through [employer] or phone. If you have a critical or abnormal lab result, we will notify you by phone as soon as possible.  Submit refill requests through [employer] or call your pharmacy and they will forward the refill request to us. Please allow 3 business days for your refill to be completed.          Additional Information About Your Visit        [employer] Information     [employer] gives you secure access to your electronic health record. If you see a primary care provider, you can also send messages to your care team and make appointments. If you have questions, please call your primary care clinic.  If you do not have a primary care provider, please call [phone] and they will assist you.        Care EveryWhere ID     This is your Care EveryWhere ID. This could be used by other organizations to access your [facility] medical records  [id number]        Your Vitals Were     Last Period                   06/30/2016            Blood Pressure from Last 3 Encounters:   10/06/17 131/87   08/01/17 113/76   07/21/17 128/80    Weight from Last 3 Encounters:   10/06/17 115.2 kg (254 lb)   08/01/17 114.3 kg (252 lb)   07/21/17 115.7 kg (255 lb)              We Performed the Following     HC PT EVAL, LOW COMPLEXITY     NEUROMUSCULAR RE-EDUCATION     THERAPEUTIC EXERCISES        Primary Care Provider Office Phone # Fax #    Anamaria Barrios William CLOVER [facility] [place] [phone]       [address] [facility] [address]        Equal Access to Services     ORI JORGE : Hadii aad ku hadasho Sokieshaali, waaxda luqadaha, qaybta kaalmada adeegyada, pati renner. So [facility] [phone].    ATENCIÓN: Si habla español, tiene a garcia disposición servicios gratuitos de asistencia lingüística. Llame al [phone].    We comply with applicable federal civil rights laws and [place] laws. We do not discriminate on the basis of race, color, national origin, age,  disability, sex, sexual orientation, or gender identity.            Thank you!     Thank you for choosing [facility]  for your care. Our goal is always to provide you with excellent care. Hearing back from our patients is one way we can continue to improve our services. Please take a few minutes to complete the written survey that you may receive in the mail after your visit with us. Thank you!             Your Updated Medication List - Protect others around you: Learn how to safely use, store and throw away your medicines at www.disposemymeds.org.          This list is accurate as of: 10/18/17  5:24 PM.  Always use your most recent med list.                   Brand Name Dispense Instructions for use Diagnosis    albuterol 108 (90 BASE) MCG/ACT Inhaler    PROAIR HFA    1 Inhaler    Inhale 2 puffs into the lungs every 6 hours    Intermittent asthma, uncomplicated       BD FRANCOISE U/F 32G X 4 MM   Generic drug:  insulin pen needle     20100 each    USE TWICE DAILY    Type 2 diabetes mellitus (H)       blood glucose monitoring lancets     1 Box    Testing twice daily.    Type 1 diabetes mellitus, uncontrolled (H)       blood glucose monitoring meter device kit    no brand specified    1 kit    Use to test blood sugar 2-3 times daily or as directed.    Uncontrolled type 2 diabetes mellitus with complication, with long-term current use of insulin (H)       blood glucose monitoring test strip    no brand specified    1 Box    2-3 times daily testing    Type 2 diabetes mellitus with complication, with long-term current use of insulin (H)       exenatide ER 2 MG pen    BYDUREON    3 each    Inject 2 mg Subcutaneous every 7 days    Type 2 diabetes mellitus with complication, with long-term current use of insulin (H)       fluticasone 110 MCG/ACT Inhaler    FLOVENT HFA    3 Inhaler    Inhale 2 puffs into the lungs 2 times daily    Morbid obesity, unspecified obesity type (H)        fluticasone 50 MCG/ACT spray    FLONASE    1 Bottle    Spray 1-2 sprays into both nostrils daily    URI, acute       HYDROcodone-acetaminophen 5-325 MG per tablet    NORCO    40 tablet    Take 1 tablet by mouth daily as needed for pain    Acute pain of left shoulder       insulin lispro prot & lispro injection    HumaLOG MIX 75/25 KWIKPEN    45 mL    INJECT 50 UNITS UNDER THE SKIN TWICE DAILY.    Type 2 diabetes mellitus with complication, with long-term current use of insulin (H)       levothyroxine 88 MCG tablet    SYNTHROID/LEVOTHROID    90 tablet    Take 1 tablet (88 mcg) by mouth daily    Hypertension goal BP (blood pressure) < 130/80       LEXAPRO PO      Take 40 mg by mouth daily.        lisinopril-hydrochlorothiazide 10-12.5 MG per tablet    PRINZIDE/ZESTORETIC    180 tablet    Take 2 tablets by mouth daily    Essential hypertension       LORazepam 0.5 MG tablet    ATIVAN     0.5 mg as needed Reported on 5/1/2017        LUNESTA 3 MG tablet   Generic drug:  eszopiclone     0    1 TABLET AT BEDTIME PRN        metFORMIN 500 MG tablet    GLUCOPHAGE    450 tablet    TAKE 3 TABLETS BY MOUTH IN THE AM AND 2 TABS IN THE PM.    Type 2 diabetes mellitus with complication, with long-term current use of insulin (H)       metoprolol 100 MG tablet    LOPRESSOR    180 tablet    Take 1 tablet (100 mg) by mouth 2 times daily    Essential hypertension       Multi-vitamin Tabs tablet   Generic drug:  multivitamin, therapeutic with minerals     0    1 TAB PO QD        olopatadine 0.1 % ophthalmic solution    PATANOL    5 mL    PLACE 1 DROP INTO BOTH EYES 2 TIMES DAILY    Conjunctivitis, allergic, bilateral       STATIN NOT PRESCRIBED (INTENTIONAL)      1 each daily Please choose reason not prescribed, below    Uncontrolled type 2 diabetes mellitus with complication, with long-term current use of insulin (H)       UNKNOWN MED DOSAGE     0    FOLIC ACID- 1 TABLET DAILY    OTC -PATIENT CHOICE       vitamin D 1000 UNITS capsule       1 CAPSULE DAILY

## 2017-10-18 NOTE — PROGRESS NOTES
[facility] Initial Evaluation    Subjective:    Patient is a 47 year old female presenting with rehab left shoulder hpi. The history is provided by the patient.   Coty Myles is a 47 year old female with a left shoulder condition.  Condition occurred with:  Unknown cause.  Condition occurred: for unknown reasons (may have happened after she was doing a lot of coloring at work).  This is a recurrent condition  Pain began about a year ago. PT referral on 10/6/17.    Patient reports pain:  Anterior, posterior, lateral and upper trap.  Radiates to:  Upper arm.  Pain is described as aching and sharp and is intermittent and reported as 10/10 (at worst).  Associated symptoms:  Loss of strength, loss of motion/stiffness and painful arc. Worse during: no pattern.  Symptoms are exacerbated by using arm overhead, using arm at shoulder level, using arm behind back, lifting and lying on extremity and relieved by rest, analgesics, heat and NSAID's.  Since onset symptoms are gradually worsening.    Previous treatment includes physical therapy (x 1 but could not make the times work for future appts).  There was no improvement following previous treatment.  General health as reported by patient is fair.  Pertinent medical history includes:  Diabetes, overweight, high blood pressure, depression, thyroid problems, asthma, migraines and menopausal.  Medical allergies: no.  Other surgeries include:  None reported.  Current medications:  Anti-inflammatory, sleep medication, anti-depressants and high blood pressure medication.  Current occupation is Social service.  Patient is working in normal job without restrictions.  Primary job tasks include:  Prolonged sitting, repetitive tasks, prolonged standing and driving.    Barriers include:  None as reported by the patient.    Red flags:  None as reported by the patient.                        Objective:    Standing Alignment:    Cervical/Thoracic:  Forward  head  Shoulder/UE:  Rounded shoulders, protracted scapula L, protracted scapula R and scapular winging L                  Flexibility/Screens:     Upper Extremity:    Decreased left upper extremity flexibility at:  Pectoralis Minor    Decreased right upper extremity flexibility present at:  Pectoralis Minor    Spine:  Decreased left spine flexibility:  Upper Trap and Levator                    Cervical/Thoracic Evaluation  Cervical AROM: normal         Headaches: cervical                         Shoulder Evaluation:  ROM:  AROM:    Flexion:  Left:  90        Abduction:  Left: 90       Internal Rotation:  Left:  Unable to reach behind back      External Rotation:  Left:  Unable to reach behind head                    PROM:  not assessed (pt with difficulty relaxing)                            Pain: pain limiting in all planes  Endfeel: empty  Strength:  not assessed (d/t pain)                      Stability Testing:  normal      Special Tests:    Left shoulder positive for the following special tests:  Impingement  Left shoulder negative for the following special tests:  Labral; Rotator cuff tear and Acromioclavicular    Palpation:    Left shoulder tenderness present at:  Supraspinatus; Levator; Rhomboids and Upper Trap  Left shoulder tenderness not present at: Bicipital Groove                                       General     ROS    Assessment/Plan:      Patient is a 47 year old female with left side shoulder complaints.    Patient has the following significant findings with corresponding treatment plan.                Diagnosis 1:  L shoulder impingement  Pain -  hot/cold therapy, manual therapy, self management, education and home program  Decreased ROM/flexibility - manual therapy, therapeutic exercise and home program  Decreased strength - therapeutic exercise, therapeutic activities and home program    Therapy Evaluation Codes:   1) History comprised of:   Personal factors that impact the plan of care:       Time since onset of symptoms.    Comorbidity factors that impact the plan of care are:      None.     Medications impacting care: None.  2) Examination of Body Systems comprised of:   Body structures and functions that impact the plan of care:      Cervical spine, Shoulder and Thoracic Spine.   Activity limitations that impact the plan of care are:      Cooking, Driving, Dressing, Lifting, Reading/Computer work, Sitting, Working, Sleeping and Laying down.  3) Clinical presentation characteristics are:   Stable/Uncomplicated.  4) Decision-Making    Low complexity using standardized patient assessment instrument and/or measureable assessment of functional outcome.  Cumulative Therapy Evaluation is: Low complexity.    Previous and current functional limitations:  (See Goal Flow Sheet for this information)    Short term and Long term goals: (See Goal Flow Sheet for this information)     Communication ability:  Patient appears to be able to clearly communicate and understand verbal and written communication and follow directions correctly.  Treatment Explanation - The following has been discussed with the patient:   RX ordered/plan of care  Anticipated outcomes  Possible risks and side effects  This patient would benefit from PT intervention to resume normal activities.   Rehab potential is good.    Frequency:  1 X week, once daily  Duration:  for 8 weeks  Discharge Plan:  Achieve all LTG.  Independent in home treatment program.  Reach maximal therapeutic benefit.    Please refer to the daily flowsheet for treatment today, total treatment time and time spent performing 1:1 timed codes.

## 2017-10-24 ENCOUNTER — TELEPHONE (OUTPATIENT)
Dept: PHARMACY | Facility: CLINIC | Age: 47
End: 2017-10-24

## 2017-10-24 NOTE — TELEPHONE ENCOUNTER
I called Coty this afternoon to inform her that we are without power at [place] today.  I offered her a phone visit instead of an in-person visit at the same time but she prefers to reschedule.  She will call the appointment line to reschedule.    Caroline Ramos, Pharm.D., [facility]  Medication Therapy Management Pharmacist  Page/VM:  [phone]

## 2017-10-31 ENCOUNTER — THERAPY VISIT (OUTPATIENT)
Dept: PHYSICAL THERAPY | Facility: CLINIC | Age: 47
End: 2017-10-31
Payer: COMMERCIAL

## 2017-10-31 DIAGNOSIS — M25.512 SHOULDER PAIN, LEFT: ICD-10-CM

## 2017-10-31 PROCEDURE — 97110 THERAPEUTIC EXERCISES: CPT | Mod: GP | Performed by: PHYSICAL THERAPIST

## 2017-10-31 PROCEDURE — 97112 NEUROMUSCULAR REEDUCATION: CPT | Mod: GP | Performed by: PHYSICAL THERAPIST

## 2017-11-13 ENCOUNTER — TELEPHONE (OUTPATIENT)
Dept: FAMILY MEDICINE | Facility: CLINIC | Age: 47
End: 2017-11-13

## 2017-11-13 NOTE — TELEPHONE ENCOUNTER
Please call patient and help her schedule appointment with MTM  Referral was placed on 10/6 but I don't see that she's been seen  Her A1c went up to 9.5% so its important to do this follow up on diabetes

## 2017-11-14 NOTE — TELEPHONE ENCOUNTER
TC contacted patient. She states that she needed to cancel her appointment due to losing power that day. She states that she will contact the clinic to reschedule this appointment as she was driving when TC contacted her.

## 2017-11-30 ENCOUNTER — OFFICE VISIT (OUTPATIENT)
Dept: PHARMACY | Facility: CLINIC | Age: 47
End: 2017-11-30
Payer: COMMERCIAL

## 2017-11-30 DIAGNOSIS — J45.20 INTERMITTENT ASTHMA: ICD-10-CM

## 2017-11-30 DIAGNOSIS — M25.512 CHRONIC LEFT SHOULDER PAIN: ICD-10-CM

## 2017-11-30 DIAGNOSIS — F51.05 INSOMNIA DUE TO OTHER MENTAL DISORDER: ICD-10-CM

## 2017-11-30 DIAGNOSIS — J45.20 MILD INTERMITTENT ASTHMA WITHOUT COMPLICATION: Chronic | ICD-10-CM

## 2017-11-30 DIAGNOSIS — F32.1 MODERATE MAJOR DEPRESSION (H): ICD-10-CM

## 2017-11-30 DIAGNOSIS — E78.5 HYPERLIPIDEMIA LDL GOAL <100: ICD-10-CM

## 2017-11-30 DIAGNOSIS — E56.9 VITAMIN DEFICIENCY: ICD-10-CM

## 2017-11-30 DIAGNOSIS — F99 INSOMNIA DUE TO OTHER MENTAL DISORDER: ICD-10-CM

## 2017-11-30 DIAGNOSIS — G89.29 CHRONIC LEFT SHOULDER PAIN: ICD-10-CM

## 2017-11-30 DIAGNOSIS — J30.2 SEASONAL ALLERGIC RHINITIS, UNSPECIFIED CHRONICITY, UNSPECIFIED TRIGGER: ICD-10-CM

## 2017-11-30 DIAGNOSIS — E03.9 HYPOTHYROIDISM, UNSPECIFIED TYPE: ICD-10-CM

## 2017-11-30 PROCEDURE — 99605 MTMS BY PHARM NP 15 MIN: CPT | Performed by: PHARMACIST

## 2017-11-30 PROCEDURE — 99607 MTMS BY PHARM ADDL 15 MIN: CPT | Performed by: PHARMACIST

## 2017-11-30 NOTE — PROGRESS NOTES
"SUBJECTIVE/OBJECTIVE:                           Coty Myles is a 47 year old female coming in for an initial visit for Medication Therapy Management.  She was referred to me from Anamaria Thomas PA-C. Coty lives with a roommate and has a Pug named Becca.    Chief Complaint: Initial CMR.     Personal Healthcare Goals: She would like to find balance and a healthy lifestyle.  This includes getting her blood sugar under better control, improving her eating and increasing her exercise while staying balanced at work and taking time for herself.     Allergies/ADRs: Reviewed in Epic  Tobacco: No tobacco use  Alcohol: not currently using  Caffeine: 1-2 cups/day of coffee  Activity: Sedentary  PMH: Reviewed in Epic    Medication Adherence/Access  The patient misses their medication 0 times per week.  - however, she later tells me that she frequently forgets her evening dose of Novolog mix  Patient is responsible for his/her own medications.   Adherence/Compliance is described as good.  Medication barriers: no issues reported by patient.  The patient fills general medications at  [facility].    Has the patient been offered to fill at [facility]? No  Other programs or coupons used: None     Diabetes:  Pt currently taking metformin 1500 mg in the morning and 1000 mg at night, Novolog 70/30 mix 50 units twice daily, Bydureon 2 mg once weekly. She has used Lantus, Avandia/Actos, Invokana (incresed triglycerides), metformin-glyburide, Trulicity (too expensive), Tradjenta/Januvia (unclear reason for d/c).  When asked how she wants to approach diabetes improvement today, she states \"I'd really like to see what I can do without changing medications today\". Pt is not experiencing side effects.  SMBG: rarely.   She is \"lazy\" and prefers not to measure.  She does not elaborate when pressed.  Patient is not experiencing hypoglycemia  Recent symptoms of high blood sugar? none  Eye exam: up to date  Foot exam: up to date  Microalbumin is < " 30 mg/g. Pt is taking an ACEi/ARB.  Aspirin: Not taking due to age  Diet/Exercise: Has a very regular eating schedule and snacks around 3 PM.  She enjoys whole sandwiches, pasta (tries to stick to 1 portion of pasta).  For breakfast, she will sometimes have oatmeal, leftovers.  For dinner, she will have sandwiches, stir tripp with vegetables and chicken.  Her snacks include PB sandwich, cutie oranges.  Convenience is important to her in her diet. She does avoid processed desserts and sweets.  She reports that her blood sugar was much better controlled when she was still living with her .  Their marriage was difficult but he cooked a lot of green vegetables.  She also went to the gym much more often to manage the stress of her marriage. She is interested in improving healthy eating and activity to help with her diabetes.  She mentions increasing servings of vegetables and wearing her fitbit more often for increased awareness of activity.     Depression:  Current medications include: Escitalopram 40 mg daily, lorazepam 0.5 mg as needed. Pt reports that depression symptoms are stable. Dr Zack Car who works out of [employer] manages these medications. She sees him regularly.  PHQ-9 SCORE 4/27/2016 8/19/2016 2/27/2017   Total Score - - -   Total Score 0 0 5     Insomnia: Current medications include: eszopiclone 3 mg. Pt reports trouble falling asleep and anxiety. She uses this every night and finds it helps her slow her mind down. She has an upcoming appointment with sleep medicine to see if there are underlying sleep disorders contributing to her problems with insomnia.  Lunesta is also managed by Dr. Car. She denies side effects.     Hyperlipidemia: Current therapy includes no current medications.  She had intolerable muscle pain related to statin therapy in the past.  Risks and benefits associated with current cholesterol and other health parameters are discussed today.  She is willing to try other non-statin medications for cholesterol but is unwilling to try another statin.   The 10-year ASCVD risk score (Gray Courtjuan GARCIA Jr, et al., 2013) is: 3.7%    Values used to calculate the score:      Age: 47 years      Sex: Female      Is Non- : No      Diabetic: Yes      Tobacco smoker: No      Systolic Blood Pressure: 131 mmHg      Is BP treated: Yes      HDL Cholesterol: 45 mg/dL      Total Cholesterol: 189 mg/dL    Asthma:  Current asthma medications: Albuterol MDI and Fluticasone (Flovent) twice daily. She only uses these as needed.  She has been using them more frequently because of the change in the weather. She does not seem to understand the difference between her albuterol inhaler and Flovent today.    Asthma triggers include: humidity and URI.  Pt reports the following symptoms: increased need of albuterol.  AAP on file: ACT Total Scores 8/19/2016 2/27/2017 7/21/2017   ACT TOTAL SCORE - - -   ASTHMA ER VISITS - - -   ASTHMA HOSPITALIZATIONS - - -   ACT TOTAL SCORE (Goal Greater than or Equal to 20) 25 25 20   In the past 12 months, how many times did you visit the emergency room for your asthma without being admitted to the hospital? 0 0 0   In the past 12 months, how many times were you hospitalized overnight because of your asthma? 0 0 0   AAP?   YES    Hypothyroidism: Patient is taking levothyroxine 88 mcg daily. Patient is having the following symptoms: none.    Allergies/Dry Eyes: Current medications include fluticasone nasal spray 55 mcg/act in each nostril as needed and Patanol drops as needed. She uses this in the fall and spring when her allergies are significant.  She feels this works well and has no concerns today.      Vitamins: Current medications include vitamin D 1000 units daily, folic acid 1 mg daily and a MVI 1 tablet daily.  She uses these medications for general health and wellness and denies side effects. Purpose for folic acid is unclear  today.     Rotator Cuff Pain/Chronic Pain: Current medications include APAP 500 mg 1-2 tablets three times a day as needed and very occasional use of hydrocodone-APAP 5-325 mg as needed.  She states she will always try to use the APAP first.  This usually manages pain for her.  When the pain becomes intense after already trying APAP, she will use the hydrocodone-APAP.  Per refill records and patient report, she uses about 40 tablets in 5-6 months.  Patient requests that I send a message to Dr. Gates requesting this for her.      Current labs include:  BP Readings from Last 3 Encounters:   10/06/17 131/87   08/01/17 113/76   07/21/17 128/80     Today's Vitals: LMP 06/30/2016     Lab Results   Component Value Date    A1C 9.5 10/06/2017    A1C 8.8 07/06/2017    A1C 9.7 05/01/2017    A1C 9.8 02/27/2017    A1C 10.3 08/19/2016     Recent Labs   Lab Test  05/01/17   1003  02/27/17   0809   08/28/15   0859   05/09/13   1111   CHOL  189  168   < >  113   --   132   HDL  45*  42*   < >  53   --   48*   LDL  Cannot estimate LDL when triglyceride exceeds 400 mg/dL  92  66   < >  28   < >  43   TRIG  429*  300*   < >  159*   --   210*   CHOLHDLRATIO   --    --    --   2.1   --   2.8    < > = values in this interval not displayed.     Liver Function Studies -   Recent Labs   Lab Test  01/31/13   1812   PROTTOTAL  6.8   ALBUMIN  3.7*   BILITOTAL  0.3   ALKPHOS  76   AST  34   ALT  33     Lab Results   Component Value Date    UCRR 133 01/24/2014    MICROL 6 01/24/2014    UMALCR 4.51 01/24/2014     Last Basic Metabolic Panel:  Lab Results   Component Value Date     07/06/2017      Lab Results   Component Value Date    POTASSIUM 3.7 07/06/2017     Lab Results   Component Value Date    CHLORIDE 106 07/06/2017     Lab Results   Component Value Date    BUN 24 07/06/2017     Lab Results   Component Value Date    CR 0.87 07/06/2017     GFR Estimate   Date Value Ref Range Status   07/06/2017 69 >60 mL/min/1.7m2 Final     Comment:      Non  GFR Calc   05/01/2017 56 (L) >60 mL/min/1.7m2 Final     Comment:     Non  GFR Calc   02/27/2017 53 (L) >60 mL/min/1.7m2 Final     Comment:     Non  GFR Calc     TSH   Date Value Ref Range Status   02/27/2017 3.55 0.40 - 4.00 mU/L Final     Most Recent Immunizations   Administered Date(s) Administered     HEPA 02/20/2014     Influenza (H1N1) 01/15/2010     Influenza (IIV3) PF 09/11/2012     Influenza Vaccine IM 3yrs+ 4 Valent IIV4 08/28/2017     Pneumococcal 23 valent 12/26/2006     TD (ADULT, 7+) 03/01/2005     TDAP Vaccine (Adacel) 06/01/2011     Tdap (Adacel,Boostrix) 01/01/2007     Typhoid IM 02/20/2014     Yellow Fever 02/20/2014       ASSESSMENT:                             Current medications were reviewed today.     Medication Adherence: needs improvement - see below    Diabetes: Needs Improvement. Patient is not meeting A1c goal of < 7%. Pt would benefit from increased adherence to Novolog Mix in the evening.  Lifestyle goals are set today as patient is unwilling to make additional medication changes:  Include at least 1 serving of vegetables daily with meals 5 days out of 7 and wear your FitBit 4 days out of 7 to increase awareness of activity.  A1c due in January.     Depression: Stable. Plan in place with psychiatry.     Insomnia: Stable. Plan in place with psychiatry.  Long-term use of lorazepam and eszopiclone is likely unsafe d/t respiratory depression and CNS depression, especially when used with opioids (even though her use is occasional). This should be continually evaluated.    Hyperlipidemia: Stable. Pt is not on moderate intensity statin which is indicated based on 2013 ACC/AHA guidelines for lipid management.  Patient has not tolerated statin therapy in the past and is unwilling to try another statin. However, patient's LDL appears well controlled without statin therapy. Patient is willing to try a non-statin therapy; however, benefit of  non-statin therapy is unclear given LDL that is well controlled. There are no morbidity and mortality benefits associated with non-statin therapies when used alone.    Asthma: Stable. Up to date and at goal of ACT > or equal to 20.  However, patient would benefit from consistent use of her Flovent inhaler to prevent cellular remodeling which contributes to worsening asthma and chronic inflammation.      Hypothyroidism: Stable. Last TSH is within normal limits.     Allergies/Dry eyes: Stable per patient report.     Vitamins: Stable per patient report.    Rotator Cuff Pain/Chronic Pain: Stable per patient report.  Max dose of APAP for 3-4000 mg daily - use caution when using with Norco.  Please request a refill for Ethel from your pharmacy.      PLAN:                            1) Improve adherence to evening dose of Novolog mix by setting alarms and keeping your insulin in both the upstairs and downstairs refrigerators.   2) Use Flovent twice daily as directed - patient declines.  3) Wear your FitBit 4 days our of 7.  4) Incorporate vegetables into your evening meal 5 days out of 7.  5) Request  Ethel refill from your pharmacy.    I spent 60 minutes with this patient today. All changes were made via collaborative practice agreement with Anamaria Thomas. A copy of the visit note was provided to the patient's primary care provider.    Will follow up in 6-8 weeks for updated A1c and see how lifestyle changes have improved A1c.  Patient is in agreement that medication changes will need to occur if her lifestyle changes are not effective.  Patient prefers to schedule a lab appointment in January prior to scheduling follow-up with us.  I will send her a reminder [email or web address] message to schedule a lab appointment at the end of December.     The patient was sent via [employer] a summary of these recommendations as an after visit summary.     Caroline Ramos, Pharm.D., BCACP  Medication Therapy Management  Pharmacist  Page/VM:  [phone]

## 2017-11-30 NOTE — MR AVS SNAPSHOT
After Visit Summary   11/30/2017    Coty Myles    MRN: [id number]           Patient Information     Date Of Birth          1970        Visit Information        Provider Department      11/30/2017 10:00 AM Caroline Ramos [facility]        Today's Diagnoses     Uncontrolled type 2 diabetes mellitus with complication, with long-term current use of insulin (H)    -  1    Hyperlipidemia LDL goal <100        Chronic left shoulder pain        Intermittent asthma        Moderate major depression (H)        Hypothyroidism, unspecified type        Seasonal allergic rhinitis, unspecified chronicity, unspecified trigger        Vitamin deficiency        Insomnia due to other mental disorder        Mild intermittent asthma without complication          Care Instructions    Recommendations from today's MT visit:                                                    Today we reviewed what your medicines are for, how to know if they are working, that your medicines are safe and how to make your medicine regimen as easy as possible.     1. Remind yourself to take your evening Novolog using alarms and by keeping an insulin pen in both the upstairs and downstairs refrigerators.    2. Include a serving of vegetables in your evening meal 5 out of 7 days a week.     3. Wear your fitbit 4 out of 7 days of the week to monitor your activity.    4. Schedule a lab appointment after January 6th to get your A1c updated.    Next MTM visit: I will send you a [employer] message at the end of December to remind you to get your labs done!    To schedule another MTM appointment, please call the clinic directly or you may call the MTM scheduling line at [phone] or toll-free at [phone].     My Clinical Pharmacist's contact information:                                                      It was a pleasure seeing you today!  Please feel free to contact me with any questions or concerns you have.       Caroline Ramos, Pharm.D., [facility]  Medication Therapy Management Pharmacist  Page/VM:  [phone]    You may receive a survey about the [facility] services you received.  I would appreciate your feedback to help me serve you better in the future. Please fill it out and return it when you can. Your comments will be anonymous.                    Follow-ups after your visit        Your next 10 appointments already scheduled     Dec 14, 2017 11:00 AM CST   New Sleep Patient with ANABELL Arellano   [facility] ([facility]    [address] [place] [id number]   [phone]            Mar 05, 2018  9:00 AM CST   PHYSICAL with Graciela Gates DO   [facility] ([facility])    [address] [place] [id number] [phone]              Future tests that were ordered for you today     Open Future Orders        Priority Expected Expires Ordered    Hemoglobin A1c Routine  11/30/2018 11/30/2017            Who to contact     If you have questions or need follow up information about today's clinic visit or your schedule please contact [facility] MTM directly at [phone].  Normal or non-critical lab and imaging results will be communicated to you by [employer], letter or phone within 4 business days after the clinic has received the results. If you do not hear from us within 7 days, please contact the clinic through [employer] or phone. If you have a critical or abnormal lab result, we will notify you by phone as soon as possible.  Submit refill requests through [employer] or call your pharmacy and they will forward the refill request to us. Please allow 3 business days for your refill to be completed.          Additional Information About Your Visit        Curse Information     [employer] gives you secure access to your electronic health record. If you see a primary care provider, you can  also send messages to your care team and make appointments. If you have questions, please call your primary care clinic.  If you do not have a primary care provider, please call [phone] and they will assist you.        Care EveryWhere ID     This is your Care EveryWhere ID. This could be used by other organizations to access your [facility] medical records  [id number]        Your Vitals Were     Last Period                   06/30/2016            Blood Pressure from Last 3 Encounters:   10/06/17 131/87   08/01/17 113/76   07/21/17 128/80    Weight from Last 3 Encounters:   10/06/17 254 lb (115.2 kg)   08/01/17 252 lb (114.3 kg)   07/21/17 255 lb (115.7 kg)               Primary Care Provider Office Phone # Fax #    Anamaria Thomas [facility] [phone]       [address] [facility] [id number]        Equal Access to Services     ORI JORGE AH: Hadii aad ku hadasho Soomaali, waaxda luqadaha, qaybta kaalmada adeegyada, waxay idiin hayaan soraya kharash sourav . So [facility] [phone].    ATENCIÓN: Si habla español, tiene a garcia disposición servicios gratuitos de asistencia lingüística. Llame al [phone].    We comply with applicable federal civil rights laws and [place] laws. We do not discriminate on the basis of race, color, national origin, age, disability, sex, sexual orientation, or gender identity.            Thank you!     Thank you for choosing [facility]  for your care. Our goal is always to provide you with excellent care. Hearing back from our patients is one way we can continue to improve our services. Please take a few minutes to complete the written survey that you may receive in the mail after your visit with us. Thank you!             Your Updated Medication List - Protect others around you: Learn how to safely use, store and throw away your medicines at www.disposemymeds.org.          This list is accurate as of: 11/30/17 11:59 PM.  Always use  your most recent med list.                   Brand Name Dispense Instructions for use Diagnosis    ACETAMINOPHEN PO      Take 325 mg by mouth every 6 hours as needed for pain        albuterol 108 (90 BASE) MCG/ACT Inhaler    PROAIR HFA    1 Inhaler    Inhale 2 puffs into the lungs every 6 hours    Intermittent asthma, uncomplicated       BD FRANCOISE U/F 32G X 4 MM   Generic drug:  insulin pen needle     20100 each    USE TWICE DAILY    Type 2 diabetes mellitus (H)       blood glucose monitoring lancets     1 Box    Testing twice daily.    Type 1 diabetes mellitus, uncontrolled (H)       blood glucose monitoring meter device kit    no brand specified    1 kit    Use to test blood sugar 2-3 times daily or as directed.    Uncontrolled type 2 diabetes mellitus with complication, with long-term current use of insulin (H)       blood glucose monitoring test strip    no brand specified    1 Box    2-3 times daily testing    Type 2 diabetes mellitus with complication, with long-term current use of insulin (H)       exenatide ER 2 MG pen    BYDUREON    3 each    Inject 2 mg Subcutaneous every 7 days    Type 2 diabetes mellitus with complication, with long-term current use of insulin (H)       fluticasone 110 MCG/ACT Inhaler    FLOVENT HFA    3 Inhaler    Inhale 2 puffs into the lungs 2 times daily    Morbid obesity, unspecified obesity type (H)       fluticasone 50 MCG/ACT spray    FLONASE    1 Bottle    Spray 1-2 sprays into both nostrils daily    URI, acute       HYDROcodone-acetaminophen 5-325 MG per tablet    NORCO    40 tablet    Take 1 tablet by mouth daily as needed for pain    Acute pain of left shoulder       insulin lispro prot & lispro injection    HumaLOG MIX 75/25 KWIKPEN    45 mL    INJECT 50 UNITS UNDER THE SKIN TWICE DAILY.    Type 2 diabetes mellitus with complication, with long-term current use of insulin (H)       levothyroxine 88 MCG tablet    SYNTHROID/LEVOTHROID    90 tablet    Take 1 tablet (88 mcg) by  mouth daily    Hypertension goal BP (blood pressure) < 130/80       LEXAPRO PO      Take 40 mg by mouth daily.        lisinopril-hydrochlorothiazide 10-12.5 MG per tablet    PRINZIDE/ZESTORETIC    180 tablet    Take 2 tablets by mouth daily    Essential hypertension       LORazepam 0.5 MG tablet    ATIVAN     0.5 mg as needed Reported on 5/1/2017        LUNESTA 3 MG tablet   Generic drug:  eszopiclone     0    1 TABLET AT BEDTIME PRN        metFORMIN 500 MG tablet    GLUCOPHAGE    450 tablet    TAKE 3 TABLETS BY MOUTH IN THE AM AND 2 TABS IN THE PM.    Type 2 diabetes mellitus with complication, with long-term current use of insulin (H)       metoprolol 100 MG tablet    LOPRESSOR    180 tablet    Take 1 tablet (100 mg) by mouth 2 times daily    Essential hypertension       Multi-vitamin Tabs tablet   Generic drug:  multivitamin, therapeutic with minerals     0    1 TAB PO QD        olopatadine 0.1 % ophthalmic solution    PATANOL    5 mL    PLACE 1 DROP INTO BOTH EYES 2 TIMES DAILY    Conjunctivitis, allergic, bilateral       STATIN NOT PRESCRIBED (INTENTIONAL)      1 each daily Please choose reason not prescribed, below    Uncontrolled type 2 diabetes mellitus with complication, with long-term current use of insulin (H)       UNKNOWN MED DOSAGE     0    FOLIC ACID- 1 TABLET DAILY    OTC -PATIENT CHOICE       vitamin D 1000 UNITS capsule      1 CAPSULE DAILY

## 2017-11-30 NOTE — Clinical Note
SIMON - no luck with changing meds.  Giving her a shot with lifestyle with the stipulation that meds will have to change if this isn't successful.  Hopefully she comes back!

## 2017-12-01 NOTE — PATIENT INSTRUCTIONS
Recommendations from today's MTM visit:                                                    Today we reviewed what your medicines are for, how to know if they are working, that your medicines are safe and how to make your medicine regimen as easy as possible.     1. Remind yourself to take your evening Novolog using alarms and by keeping an insulin pen in both the upstairs and downstairs refrigerators.    2. Include a serving of vegetables in your evening meal 5 out of 7 days a week.     3. Wear your fitbit 4 out of 7 days of the week to monitor your activity.    4. Schedule a lab appointment after January 6th to get your A1c updated.    Next MTM visit: I will send you a [employer] message at the end of December to remind you to get your labs done!    To schedule another MTM appointment, please call the clinic directly or you may call the MTM scheduling line at [phone] or toll-free at [phone].     My Clinical Pharmacist's contact information:                                                      It was a pleasure seeing you today!  Please feel free to contact me with any questions or concerns you have.      Caroline Ramos, Pharm.D., [facility]  Medication Therapy Management Pharmacist  Page/VM:  [phone]    You may receive a survey about the MTM services you received.  I would appreciate your feedback to help me serve you better in the future. Please fill it out and return it when you can. Your comments will be anonymous.

## 2018-01-01 DIAGNOSIS — H10.13 CONJUNCTIVITIS, ALLERGIC, BILATERAL: ICD-10-CM

## 2018-01-02 RX ORDER — OLOPATADINE HYDROCHLORIDE 1 MG/ML
SOLUTION/ DROPS OPHTHALMIC
Qty: 5 ML | Refills: 3 | Status: SHIPPED | OUTPATIENT
Start: 2018-01-02 | End: 2018-06-17

## 2018-01-02 NOTE — TELEPHONE ENCOUNTER
Requested Prescriptions   Pending Prescriptions Disp Refills     olopatadine (PATANOL) 0.1 % ophthalmic solution [Pharmacy Med Name: OLOPATADINE HCL 0.1% EYE DROPS]  Last Written Prescription Date:  8/1/2017  Last Fill Quantity: 5 mL,  # refills: 3   Last Office Visit with [facility], [facility] or [facility] prescribing provider:  10/6/2017  Future Office Visit:    Next 5 appointments (look out 90 days)     Mar 05, 2018  9:00 AM CST   [facility] with Graciela Gates DO   [facility]    [address] [place] [id number] [phone]                  5 mL 3     Sig: PLACE 1 DROP INTO BOTH EYES 2 TIMES DAILY    Miscellaneous Opthalmic Allergy Drops Protocol Passed    1/1/2018  1:36 AM       Passed - Patient is age 4 or older       Passed - Recent or future visit with authorizing provider's specialty    Patient had office visit in the last year or has a visit in the next 30 days with authorizing provider.  See chart review.          Passed - Patient is not pregnant       Passed - No positive pregnancy test on record in past 12 mos

## 2018-01-03 NOTE — TELEPHONE ENCOUNTER
Prescription approved per [facility] Refill Protocol.    Barry Pedroza RN

## 2018-02-05 ENCOUNTER — OFFICE VISIT (OUTPATIENT)
Dept: FAMILY MEDICINE | Facility: CLINIC | Age: 48
End: 2018-02-05
Payer: COMMERCIAL

## 2018-02-05 ENCOUNTER — RADIANT APPOINTMENT (OUTPATIENT)
Dept: GENERAL RADIOLOGY | Facility: CLINIC | Age: 48
End: 2018-02-05
Attending: PHYSICIAN ASSISTANT
Payer: COMMERCIAL

## 2018-02-05 VITALS — TEMPERATURE: 97.7 F | DIASTOLIC BLOOD PRESSURE: 85 MMHG | SYSTOLIC BLOOD PRESSURE: 127 MMHG | HEART RATE: 90 BPM

## 2018-02-05 DIAGNOSIS — M79.672 LEFT FOOT PAIN: Primary | ICD-10-CM

## 2018-02-05 DIAGNOSIS — M25.562 ACUTE PAIN OF LEFT KNEE: ICD-10-CM

## 2018-02-05 DIAGNOSIS — M79.672 LEFT FOOT PAIN: ICD-10-CM

## 2018-02-05 PROCEDURE — 99213 OFFICE O/P EST LOW 20 MIN: CPT | Performed by: PHYSICIAN ASSISTANT

## 2018-02-05 PROCEDURE — 73630 X-RAY EXAM OF FOOT: CPT | Mod: LT

## 2018-02-05 NOTE — PROGRESS NOTES
SUBJECTIVE:   Coty Myles is a 47 year old female who presents to clinic today for the following health issues:        L KNEE AND FOOT PAIN AND SWOLLEN,PATIENT FELL THIS MORNING     Onset: today     Description:   Location: left knee and L foot   Character: Stabbing-knee, foot throbbing and sharp     Intensity: 5/10-knee-sitting,8/10 foot     Progression of Symptoms: worse    Accompanying Signs & Symptoms:  Other symptoms: swelling    History:   Previous similar pain: no       Precipitating factors:   Trauma or overuse: YES- patient fell this morning     Alleviating factors:  Improved by: none    Therapies Tried and outcome: rest    No head injury   Was not feeling any chest pain or dizziness at time of fall.  Most of her pain is over the outside of the knee and foot.     Hx of stress fracture in left foot 20 years ago.            Problem list and histories reviewed & adjusted, as indicated.  Additional history: as documented    Patient Active Problem List   Diagnosis     Attention deficit disorder     Sciatica     Hypothyroidism     iamLUMBOSACRAL NEURITIS NOS     Hyperlipidemia LDL goal <100     Hypertension goal BP (blood pressure) < 130/80     Moderate major depression (H)     Family history of ischemic heart disease     Intermittent asthma     ASCUS with positive high risk HPV cervical     Morbid obesity (H)     Headache     Uncontrolled type 2 diabetes mellitus with complication (H)     Shoulder pain, left     Past Surgical History:   Procedure Laterality Date     ENT SURGERY       ORTHOPEDIC SURGERY       SURGICAL HISTORY OF -   1990    right knee arthroscopic     SURGICAL HISTORY OF -   2000    lump on neck removed       Social History   Substance Use Topics     Smoking status: Never Smoker     Smokeless tobacco: Never Used     Alcohol use Yes      Comment: 3 to 4 per year     Family History   Problem Relation Age of Onset     HEART DISEASE Father      heart attack at 64     DIABETES Father      C.A.D.  Father      DIABETES Paternal Grandmother      CANCER Paternal Grandfather      Lung CA     DIABETES Maternal Grandmother      DIABETES Mother      Gynecology Mother      hysterectomy- ovarian cyst     Respiratory Mother      COPD- dependent on oxygen, passed away at age 69     Allergies Mother      to percocet     DIABETES Paternal Aunt      DIABETES Paternal Uncle      Alcohol/Drug Paternal Uncle      DIABETES Brother      CEREBROVASCULAR DISEASE No family hx of            Reviewed and updated as needed this visit by clinical staff  Tobacco  Allergies  Med Hx  Surg Hx  Fam Hx  Soc Hx      Reviewed and updated as needed this visit by Provider         ROS:  As above    OBJECTIVE:     /85 (BP Location: Right arm, Patient Position: Sitting, Cuff Size: Adult Large)  Pulse 90  Temp 97.7  F (36.5  C) (Oral)  LMP 06/30/2016  There is no height or weight on file to calculate BMI.  GENERAL: alert, no distress and obese  RESP: lungs clear to auscultation - no rales, rhonchi or wheezes  CV: regular rates and rhythm, normal S1 S2, no S3 or S4, no murmur, click or rub and peripheral pulses strong  MS: limited rom of her left knee with extension, normal rom of right knee, normal rom of right ankle, limited rom of left ankle, swelling over the lateral aspect of left foot just distal lateral malleolus-bruising and swelling noted in this area too.  Normal strength bilateral lower extremities, negative anterior drawer and donta's bilaterally    SKIN: small abrasion over left knee    Xray appears normal with no fracture-wait for final read    ASSESSMENT/PLAN:       1. Left foot pain  Xray appears normal other than soft tissue swelling.  For now brace and RICE.    - XR Foot Left G/E 3 Views; Future  - order for DME; Equipment being ordered: elastic ankle brace  Dispense: 1 each; Refill: 0    2. Acute pain of left knee  Continue symptomatic treatment.  return to clinic if not improving.         Patient Instructions    Elevate foot  Ice today  Tylenol and ibuprofen for pain       Татьяна Alcaraz PA-C  [facility]

## 2018-02-05 NOTE — LETTER
February 5, 2018      Coty Hand  [address] [address]        To Whom It May Concern,      Please excuse Coty from work today 2/5/18.  She may return to work 2/6/18 but please allow her sit with minimal walking around until her foot is better.            Sincerely,        Татьяна Alcaraz PA-C

## 2018-02-05 NOTE — MR AVS SNAPSHOT
After Visit Summary   2/5/2018    Coty Myles    MRN: [id number]           Patient Information     Date Of Birth          1970        Visit Information        Provider Department      2/5/2018 1:20 PM Татьяна Alcaraz PA-C [facility]        Today's Diagnoses     Left foot pain    -  1    Acute pain of left knee          Care Instructions    Elevate foot  Ice today  Tylenol and ibuprofen for pain           Follow-ups after your visit        Your next 10 appointments already scheduled     Mar 05, 2018  9:00 AM CST   PHYSICAL with Graciela Gates DO   [facility]    [address] [place] [id number] [phone]              Who to contact     If you have questions or need follow up information about today's clinic visit or your schedule please contact [facility] directly at [phone].  Normal or non-critical lab and imaging results will be communicated to you by MyChart, letter or phone within 4 business days after the clinic has received the results. If you do not hear from us within 7 days, please contact the clinic through [employer] Media[employer] or phone. If you have a critical or abnormal lab result, we will notify you by phone as soon as possible.  Submit refill requests through ONTRAPORT or call your pharmacy and they will forward the refill request to us. Please allow 3 business days for your refill to be completed.          Additional Information About Your Visit        MyChart Information     ONTRAPORT gives you secure access to your electronic health record. If you see a primary care provider, you can also send messages to your care team and make appointments. If you have questions, please call your primary care clinic.  If you do not have a primary care provider, please call [phone] and they will assist you.        Care EveryWhere ID     This is your Care EveryWhere ID.  This could be used by other organizations to access your [place] medical records  [id number]        Your Vitals Were     Pulse Temperature Last Period             90 97.7  F (36.5  C) (Oral) 06/30/2016          Blood Pressure from Last 3 Encounters:   02/05/18 127/85   10/06/17 131/87   08/01/17 113/76    Weight from Last 3 Encounters:   10/06/17 254 lb (115.2 kg)   08/01/17 252 lb (114.3 kg)   07/21/17 255 lb (115.7 kg)                 Today's Medication Changes          These changes are accurate as of 2/5/18  2:16 PM.  If you have any questions, ask your nurse or doctor.               Start taking these medicines.        Dose/Directions    order for DME   Used for:  Left foot pain   Started by:  Татьяна Alcaraz PA-C        Equipment being ordered: elastic ankle brace   Quantity:  1 each   Refills:  0            Where to get your medicines      Some of these will need a paper prescription and others can be bought over the counter.  Ask your nurse if you have questions.     Bring a paper prescription for each of these medications     order for DME                Primary Care Provider Office Phone # Fax #    Anamaria CLOVER [facility] [phone]       [address] [facility] [address]        Equal Access to Services     ORI JORGE AH: Brandi victor hadasho Soomaali, waaxda luqadaha, qaybta kaalmada adeegyada, waxjacquelyn renner. So [facility] [phone].    ATENCIÓN: Si habla español, tiene a garcia disposición servicios gratuitos de asistencia lingüística. Llame al [phone].    We comply with applicable federal civil rights laws and [place] laws. We do not discriminate on the basis of race, color, national origin, age, disability, sex, sexual orientation, or gender identity.            Thank you!     Thank you for choosing [facility]  for your care. Our goal is always to provide you with excellent care. Hearing back from our  patients is one way we can continue to improve our services. Please take a few minutes to complete the written survey that you may receive in the mail after your visit with us. Thank you!             Your Updated Medication List - Protect others around you: Learn how to safely use, store and throw away your medicines at www.disposemymeds.org.          This list is accurate as of 2/5/18  2:16 PM.  Always use your most recent med list.                   Brand Name Dispense Instructions for use Diagnosis    ACETAMINOPHEN PO      Take 325 mg by mouth every 6 hours as needed for pain        albuterol 108 (90 BASE) MCG/ACT Inhaler    PROAIR HFA    1 Inhaler    Inhale 2 puffs into the lungs every 6 hours    Intermittent asthma, uncomplicated       BD FRANCOISE U/F 32G X 4 MM   Generic drug:  insulin pen needle     20100 each    USE TWICE DAILY    Type 2 diabetes mellitus (H)       blood glucose monitoring lancets     1 Box    Testing twice daily.    Type 1 diabetes mellitus, uncontrolled (H)       blood glucose monitoring meter device kit    no brand specified    1 kit    Use to test blood sugar 2-3 times daily or as directed.    Uncontrolled type 2 diabetes mellitus with complication, with long-term current use of insulin (H)       blood glucose monitoring test strip    no brand specified    1 Box    2-3 times daily testing    Type 2 diabetes mellitus with complication, with long-term current use of insulin (H)       exenatide ER 2 MG pen    BYDUREON    3 each    Inject 2 mg Subcutaneous every 7 days    Type 2 diabetes mellitus with complication, with long-term current use of insulin (H)       fluticasone 110 MCG/ACT Inhaler    FLOVENT HFA    3 Inhaler    Inhale 2 puffs into the lungs 2 times daily    Morbid obesity, unspecified obesity type (H)       fluticasone 50 MCG/ACT spray    FLONASE    1 Bottle    Spray 1-2 sprays into both nostrils daily    URI, acute       insulin lispro prot & lispro injection    HumaLOG MIX 75/25  KWIKPEN    45 mL    INJECT 50 UNITS UNDER THE SKIN TWICE DAILY.    Type 2 diabetes mellitus with complication, with long-term current use of insulin (H)       levothyroxine 88 MCG tablet    SYNTHROID/LEVOTHROID    90 tablet    Take 1 tablet (88 mcg) by mouth daily    Hypertension goal BP (blood pressure) < 130/80       LEXAPRO PO      Take 40 mg by mouth daily.        lisinopril-hydrochlorothiazide 10-12.5 MG per tablet    PRINZIDE/ZESTORETIC    180 tablet    Take 2 tablets by mouth daily    Essential hypertension       LORazepam 0.5 MG tablet    ATIVAN     0.5 mg as needed Reported on 5/1/2017        LUNESTA 3 MG tablet   Generic drug:  eszopiclone     0    1 TABLET AT BEDTIME PRN        metFORMIN 500 MG tablet    GLUCOPHAGE    450 tablet    TAKE 3 TABLETS BY MOUTH IN THE AM AND 2 TABS IN THE PM.    Type 2 diabetes mellitus with complication, with long-term current use of insulin (H)       metoprolol tartrate 100 MG tablet    LOPRESSOR    180 tablet    TAKE 1 TABLET (100 MG) BY MOUTH 2 TIMES DAILY    Essential hypertension       Multi-vitamin Tabs tablet   Generic drug:  multivitamin, therapeutic with minerals     0    1 TAB PO QD        olopatadine 0.1 % ophthalmic solution    PATANOL    5 mL    PLACE 1 DROP INTO BOTH EYES 2 TIMES DAILY    Conjunctivitis, allergic, bilateral       order for DME     1 each    Equipment being ordered: elastic ankle brace    Left foot pain       STATIN NOT PRESCRIBED (INTENTIONAL)      1 each daily Please choose reason not prescribed, below    Uncontrolled type 2 diabetes mellitus with complication, with long-term current use of insulin (H)       UNKNOWN MED DOSAGE     0    FOLIC ACID- 1 TABLET DAILY    OTC -PATIENT CHOICE       vitamin D 1000 UNITS capsule      1 CAPSULE DAILY

## 2018-02-09 ENCOUNTER — TELEPHONE (OUTPATIENT)
Dept: FAMILY MEDICINE | Facility: CLINIC | Age: 48
End: 2018-02-09

## 2018-02-09 ENCOUNTER — ALLIED HEALTH/NURSE VISIT (OUTPATIENT)
Dept: NURSING | Facility: CLINIC | Age: 48
End: 2018-02-09
Payer: COMMERCIAL

## 2018-02-09 DIAGNOSIS — M79.672 LEFT FOOT PAIN: Primary | ICD-10-CM

## 2018-02-09 PROCEDURE — 99207 ZZC NO CHARGE NURSE ONLY: CPT

## 2018-02-09 NOTE — TELEPHONE ENCOUNTER
Looks like patient was seen by Shamika Alcaraz on 2/5/18 and Shamika ordered elastic ankle brace. Since its already been ordered I imagine patient can come to clinic to . Will need to sign DME form. Either I, or Shamika Alcaraz can sign it.

## 2018-02-09 NOTE — NURSING NOTE
Patient here to be fitted for DME post op shoe. Was seen in clinic 2/5/18 declined ankle brace at that visit. Today reports swelling improved but still has difficulty with weight bearing and ambulation. Provider ordered post op shoe today. [employer] lg post op shoe, fits appropriately pt reports feeling more support and relief with pain during weight bearing and ambulation. Education provided, encouraged pt to call clinic with any additional concerns or questions.     Omaira Encinas RN

## 2018-02-09 NOTE — TELEPHONE ENCOUNTER
I spoke with Татьяна Alcaraz PA-C, went to look at ankle braces together in store room.   Shamika decided post op shoe would be the best option.   She will write new order and sign DME.     If this does not work for patient, she can simply go to [facility] or another pharmacy and see what they have.   She does not have a fracture.    I called patient, put her on RN schedule for 4:30 pm to see me to try on shoes.    Megan Hendrickson RN  [facility]

## 2018-02-09 NOTE — TELEPHONE ENCOUNTER
I called patient, she states the item was not in stock when she was seen.    I will huddle with MA to see what item this is and if we have it now before advising patient to come in.    Megan Hendrickson RN  [facility]

## 2018-02-09 NOTE — TELEPHONE ENCOUNTER
Reason for Call: Request for an order or referral:    Order or referral being requested: DME for boot    Date needed: as soon as possible    Has the patient been seen by the PCP for this problem? YES    Additional comments: patient was seen on 2-5-18.  Regretting that she refused a boot for her foot.  Is having throbbing pain.  Asking to get boot.  Please call to advise.    Phone number Patient can be reached at:  Home number on file [phone] (home)    Best Time:  any    Can we leave a detailed message on this number?  YES    Call taken on 2/9/2018 at 1:01 PM by Chata Gutierrez

## 2018-02-09 NOTE — MR AVS SNAPSHOT
After Visit Summary   2/9/2018    Coty Myles    MRN: [id number]           Patient Information     Date Of Birth          1970        Visit Information        Provider Department      2/9/2018 4:30 PM CP RN [facility]        Today's Diagnoses     Left foot pain    -  1       Follow-ups after your visit        Your next 10 appointments already scheduled     Mar 05, 2018  9:00 AM CST   [facility]YS[facility] with [facility]iela Gates,    [facility]    [address] [place] [address] [phone]              Who to contact     If you have questions or need follow up information about today's clinic visit or your schedule please contact [facility] directly at [phone].  Normal or non-critical lab and imaging results will be communicated to you by MyChart, letter or phone within 4 business days after the clinic has received the results. If you do not hear from us within 7 days, please contact the clinic through MyChart or phone. If you have a critical or abnormal lab result, we will notify you by phone as soon as possible.  Submit refill requests through Apprats or call your pharmacy and they will forward the refill request to us. Please allow 3 business days for your refill to be completed.          Additional Information About Your Visit        MyChart Information     [employer]ats gives you secure access to your electronic health record. If you see a primary care provider, you can also send messages to your care team and make appointments. If you have questions, please call your primary care clinic.  If you do not have a primary care provider, please call [phone] and they will assist you.        Care EveryWhere ID     This is your Care EveryWhere ID. This could be used by other organizations to access your [place] medical records  [id number]        Your Vitals Were     Last Period                    06/30/2016            Blood Pressure from Last 3 Encounters:   02/05/18 127/85   10/06/17 131/87   08/01/17 113/76    Weight from Last 3 Encounters:   10/06/17 254 lb (115.2 kg)   08/01/17 252 lb (114.3 kg)   07/21/17 255 lb (115.7 kg)              Today, you had the following     No orders found for display       Primary Care Provider Office Phone # Fax #    Anamaria Thomas, CLOVER [facility] [phone]       [address] [facility] [address]        Equal Access to Services     : Hadii aad ku hadasho Soomaali, waaxda luqadaha, qaybta kaalmada adeegyada, pati carey adesivakumar benjamin . So [facility] [phone].    ATENCIÓN: Si habla español, tiene a garcia disposición servicios gratuitos de asistencia lingüística. Llame al [phone].    We comply with applicable federal civil rights laws and [place] laws. We do not discriminate on the basis of race, color, national origin, age, disability, sex, sexual orientation, or gender identity.            Thank you!     Thank you for choosing [facility]  for your care. Our goal is always to provide you with excellent care. Hearing back from our patients is one way we can continue to improve our services. Please take a few minutes to complete the written survey that you may receive in the mail after your visit with us. Thank you!             Your Updated Medication List - Protect others around you: Learn how to safely use, store and throw away your medicines at www.disposemymeds.org.          This list is accurate as of 2/9/18  4:44 PM.  Always use your most recent med list.                   Brand Name Dispense Instructions for use Diagnosis    ACETAMINOPHEN PO      Take 325 mg by mouth every 6 hours as needed for pain        albuterol 108 (90 BASE) MCG/ACT Inhaler    PROAIR HFA    1 Inhaler    Inhale 2 puffs into the lungs every 6 hours    Intermittent asthma, uncomplicated       BD FRANCOISE U/F 32G  X 4 MM   Generic drug:  insulin pen needle     20100 each    USE TWICE DAILY    Type 2 diabetes mellitus (H)       blood glucose monitoring lancets     1 Box    Testing twice daily.    Type 1 diabetes mellitus, uncontrolled (H)       blood glucose monitoring meter device kit    no brand specified    1 kit    Use to test blood sugar 2-3 times daily or as directed.    Uncontrolled type 2 diabetes mellitus with complication, with long-term current use of insulin (H)       blood glucose monitoring test strip    no brand specified    1 Box    2-3 times daily testing    Type 2 diabetes mellitus with complication, with long-term current use of insulin (H)       exenatide ER 2 MG pen    BYDUREON    3 each    Inject 2 mg Subcutaneous every 7 days    Type 2 diabetes mellitus with complication, with long-term current use of insulin (H)       fluticasone 110 MCG/ACT Inhaler    FLOVENT HFA    3 Inhaler    Inhale 2 puffs into the lungs 2 times daily    Morbid obesity, unspecified obesity type (H)       fluticasone 50 MCG/ACT spray    FLONASE    1 Bottle    Spray 1-2 sprays into both nostrils daily    URI, acute       insulin lispro prot & lispro injection    HumaLOG MIX 75/25 KWIKPEN    45 mL    INJECT 50 UNITS UNDER THE SKIN TWICE DAILY.    Type 2 diabetes mellitus with complication, with long-term current use of insulin (H)       levothyroxine 88 MCG tablet    SYNTHROID/LEVOTHROID    90 tablet    Take 1 tablet (88 mcg) by mouth daily    Hypertension goal BP (blood pressure) < 130/80       LEXAPRO PO      Take 40 mg by mouth daily.        lisinopril-hydrochlorothiazide 10-12.5 MG per tablet    PRINZIDE/ZESTORETIC    180 tablet    Take 2 tablets by mouth daily    Essential hypertension       LORazepam 0.5 MG tablet    ATIVAN     0.5 mg as needed Reported on 5/1/2017        LUNESTA 3 MG tablet   Generic drug:  eszopiclone     0    1 TABLET AT BEDTIME PRN        metFORMIN 500 MG tablet    GLUCOPHAGE    450 tablet    TAKE 3 TABLETS  BY MOUTH IN THE AM AND 2 TABS IN THE PM.    Type 2 diabetes mellitus with complication, with long-term current use of insulin (H)       metoprolol tartrate 100 MG tablet    LOPRESSOR    180 tablet    TAKE 1 TABLET (100 MG) BY MOUTH 2 TIMES DAILY    Essential hypertension       Multi-vitamin Tabs tablet   Generic drug:  multivitamin, therapeutic with minerals     0    1 TAB PO QD        olopatadine 0.1 % ophthalmic solution    PATANOL    5 mL    PLACE 1 DROP INTO BOTH EYES 2 TIMES DAILY    Conjunctivitis, allergic, bilateral       * order for DME     1 each    Equipment being ordered: elastic ankle brace    Left foot pain       * order for DME     1 each    Equipment being ordered: post op shoe    Left foot pain       STATIN NOT PRESCRIBED (INTENTIONAL)      1 each daily Please choose reason not prescribed, below    Uncontrolled type 2 diabetes mellitus with complication, with long-term current use of insulin (H)       UNKNOWN MED DOSAGE     0    FOLIC ACID- 1 TABLET DAILY    OTC -PATIENT CHOICE       vitamin D 1000 UNITS capsule      1 CAPSULE DAILY        * Notice:  This list has 2 medication(s) that are the same as other medications prescribed for you. Read the directions carefully, and ask your doctor or other care provider to review them with you.

## 2018-03-06 ENCOUNTER — OFFICE VISIT (OUTPATIENT)
Dept: FAMILY MEDICINE | Facility: CLINIC | Age: 48
End: 2018-03-06
Payer: COMMERCIAL

## 2018-03-06 VITALS
WEIGHT: 254 LBS | HEART RATE: 97 BPM | BODY MASS INDEX: 40.82 KG/M2 | HEIGHT: 66 IN | SYSTOLIC BLOOD PRESSURE: 136 MMHG | TEMPERATURE: 97.9 F | OXYGEN SATURATION: 97 % | DIASTOLIC BLOOD PRESSURE: 90 MMHG

## 2018-03-06 DIAGNOSIS — R82.90 NONSPECIFIC FINDING ON EXAMINATION OF URINE: ICD-10-CM

## 2018-03-06 DIAGNOSIS — R30.0 DYSURIA: Primary | ICD-10-CM

## 2018-03-06 DIAGNOSIS — N30.00 ACUTE CYSTITIS WITHOUT HEMATURIA: ICD-10-CM

## 2018-03-06 DIAGNOSIS — N95.0 POST-MENOPAUSAL BLEEDING: ICD-10-CM

## 2018-03-06 LAB
ALBUMIN UR-MCNC: ABNORMAL MG/DL
APPEARANCE UR: CLEAR
BACTERIA #/AREA URNS HPF: ABNORMAL /HPF
BILIRUB UR QL STRIP: NEGATIVE
COLOR UR AUTO: YELLOW
GLUCOSE UR STRIP-MCNC: NEGATIVE MG/DL
HGB UR QL STRIP: ABNORMAL
KETONES UR STRIP-MCNC: NEGATIVE MG/DL
LEUKOCYTE ESTERASE UR QL STRIP: ABNORMAL
NITRATE UR QL: NEGATIVE
NON-SQ EPI CELLS #/AREA URNS LPF: ABNORMAL /LPF
PH UR STRIP: 6 PH (ref 5–7)
RBC #/AREA URNS AUTO: ABNORMAL /HPF
SOURCE: ABNORMAL
SP GR UR STRIP: 1.02 (ref 1–1.03)
UROBILINOGEN UR STRIP-ACNC: 0.2 EU/DL (ref 0.2–1)
WBC #/AREA URNS AUTO: ABNORMAL /HPF

## 2018-03-06 PROCEDURE — 81001 URINALYSIS AUTO W/SCOPE: CPT | Performed by: NURSE PRACTITIONER

## 2018-03-06 PROCEDURE — 87086 URINE CULTURE/COLONY COUNT: CPT | Performed by: NURSE PRACTITIONER

## 2018-03-06 PROCEDURE — 99213 OFFICE O/P EST LOW 20 MIN: CPT | Performed by: NURSE PRACTITIONER

## 2018-03-06 PROCEDURE — 87186 SC STD MICRODIL/AGAR DIL: CPT | Performed by: NURSE PRACTITIONER

## 2018-03-06 PROCEDURE — 87088 URINE BACTERIA CULTURE: CPT | Performed by: NURSE PRACTITIONER

## 2018-03-06 RX ORDER — SULFAMETHOXAZOLE/TRIMETHOPRIM 800-160 MG
1 TABLET ORAL 2 TIMES DAILY
Qty: 6 TABLET | Refills: 0 | Status: SHIPPED | OUTPATIENT
Start: 2018-03-06 | End: 2018-03-09

## 2018-03-06 ASSESSMENT — PAIN SCALES - GENERAL: PAINLEVEL: NO PAIN (0)

## 2018-03-06 NOTE — MR AVS SNAPSHOT
After Visit Summary   3/6/2018    Coty Myles    MRN: [id number]           Patient Information     Date Of Birth          1970        Visit Information        Provider Department      3/6/2018 1:40 PM Anamaria Thomas APRN [facility]        Today's Diagnoses     Dysuria    -  1    Nonspecific finding on examination of urine        Acute cystitis without hematuria        Post-menopausal bleeding           Follow-ups after your visit        Additional Services     OB/GYN REFERRAL       Your provider has referred you to:  FMG: [facility] Jarred [facility] - [place] [phone]   [email or web address]  FMG: [facility] Jeannine Jorge [facility] - Jeannine Jorge [phone]   [email or web address]  FMG: [facility] [place] [facility] [place] [phone]   [email or web address]    Please be aware that coverage of these services is subject to the terms and limitations of your health insurance plan.  Call member services at your health plan with any benefit or coverage questions.      Please bring the following with you to your appointment:    (1) Any X-Rays, CTs or MRIs which have been performed.  Contact the facility where they were done to arrange for  prior to your scheduled appointment.   (2) List of current medications   (3) This referral request   (4) Any documents/labs given to you for this referral                  Your next 10 appointments already scheduled     Mar 12, 2018  9:20 AM CONT   Felix Ledezma [facility] Fer Jules [facility]-C   [facility]    [address] [place] [id number] [phone]            Apr 02, 2018  9:00 AM CONT   Felix Physical Adult with Graciela Gates,    [facility]    [address] [place] [id number] [phone]              Who to  "contact     If you have questions or need follow up information about today's clinic visit or your schedule please contact [facility] directly at [phone].  Normal or non-critical lab and imaging results will be communicated to you by MyChart, letter or phone within 4 business days after the clinic has received the results. If you do not hear from us within 7 days, please contact the clinic through [employer] or phone. If you have a critical or abnormal lab result, we will notify you by phone as soon as possible.  Submit refill requests through [employer] or call your pharmacy and they will forward the refill request to us. Please allow 3 business days for your refill to be completed.          Additional Information About Your Visit        [employer] Information     [employer] gives you secure access to your electronic health record. If you see a primary care provider, you can also send messages to your care team and make appointments. If you have questions, please call your primary care clinic.  If you do not have a primary care provider, please call [phone] and they will assist you.        Care EveryWhere ID     This is your Care EveryWhere ID. This could be used by other organizations to access your [facility] medical records  [id number]        Your Vitals Were     Pulse Temperature Height Last Period Pulse Oximetry BMI (Body Mass Index)    97 97.9  F (36.6  C) (Oral) 5' 5.5\" (1.664 m) 06/30/2016 97% 41.62 kg/m2       Blood Pressure from Last 3 Encounters:   03/06/18 136/90   02/05/18 127/85   10/06/17 131/87    Weight from Last 3 Encounters:   03/06/18 254 lb (115.2 kg)   10/06/17 254 lb (115.2 kg)   08/01/17 252 lb (114.3 kg)              We Performed the Following     OB/GYN REFERRAL     UA reflex to Microscopic and Culture     Urine Culture Aerobic Bacterial     Urine Microscopic          Today's Medication Changes          These changes are accurate as of 3/6/18  2:12 PM.  If you have any " questions, ask your nurse or doctor.               Start taking these medicines.        Dose/Directions    sulfamethoxazole-trimethoprim 800-160 MG per tablet   Commonly known as:  BACTRIM DS/SEPTRA DS   Used for:  Acute cystitis without hematuria   Started by:  Anamaria Thomas APRN CNP        Dose:  1 tablet   Take 1 tablet by mouth 2 times daily for 3 days   Quantity:  6 tablet   Refills:  0            Where to get your medicines      These medications were sent to [place] [id number] [facility] - [place], [place] - [address] [facility]  [address] [address]     Phone:  [phone]     sulfamethoxazole-trimethoprim 800-160 MG per tablet                Primary Care Provider Office Phone # Fax #    CLOVER Ramirez [phone]       [address] [facility] [address]        Equal Access to Services     ORI JORGE : Hadii dasha victor hadasho Soomaali, waaxda luqadaha, qaybta kaalmada adeegyada, pati costello haycarmel benjamin . So [facility] [phone].    ATENCIÓN: Si habla español, tiene a garcia disposición servicios gratuitos de asistencia lingüística. Llame al [phone].    We comply with applicable federal civil rights laws and [place] laws. We do not discriminate on the basis of race, color, national origin, age, disability, sex, sexual orientation, or gender identity.            Thank you!     Thank you for choosing [facility]  for your care. Our goal is always to provide you with excellent care. Hearing back from our patients is one way we can continue to improve our services. Please take a few minutes to complete the written survey that you may receive in the mail after your visit with us. Thank you!             Your Updated Medication List - Protect others around you: Learn how to safely use, store and throw away your medicines at www.disposemymeds.org.          This list is accurate as of 3/6/18  2:12 PM.  Always use  your most recent med list.                   Brand Name Dispense Instructions for use Diagnosis    ACETAMINOPHEN PO      Take 325 mg by mouth every 6 hours as needed for pain        albuterol 108 (90 BASE) MCG/ACT Inhaler    PROAIR HFA    1 Inhaler    Inhale 2 puffs into the lungs every 6 hours    Intermittent asthma, uncomplicated       BD FRANCOISE U/F 32G X 4 MM   Generic drug:  insulin pen needle     20100 each    USE TWICE DAILY    Type 2 diabetes mellitus (H)       blood glucose monitoring lancets     1 Box    Testing twice daily.    Type 1 diabetes mellitus, uncontrolled (H)       blood glucose monitoring meter device kit    no brand specified    1 kit    Use to test blood sugar 2-3 times daily or as directed.    Uncontrolled type 2 diabetes mellitus with complication, with long-term current use of insulin (H)       blood glucose monitoring test strip    no brand specified    1 Box    2-3 times daily testing    Type 2 diabetes mellitus with complication, with long-term current use of insulin (H)       exenatide ER 2 MG pen    BYDUREON    3 each    Inject 2 mg Subcutaneous every 7 days    Type 2 diabetes mellitus with complication, with long-term current use of insulin (H)       fluticasone 110 MCG/ACT Inhaler    FLOVENT HFA    3 Inhaler    Inhale 2 puffs into the lungs 2 times daily    Morbid obesity, unspecified obesity type (H)       fluticasone 50 MCG/ACT spray    FLONASE    1 Bottle    Spray 1-2 sprays into both nostrils daily    URI, acute       insulin lispro prot & lispro injection    HumaLOG MIX 75/25 KWIKPEN    45 mL    INJECT 50 UNITS UNDER THE SKIN TWICE DAILY.    Type 2 diabetes mellitus with complication, with long-term current use of insulin (H)       levothyroxine 88 MCG tablet    SYNTHROID/LEVOTHROID    90 tablet    Take 1 tablet (88 mcg) by mouth daily    Hypertension goal BP (blood pressure) < 130/80       LEXAPRO PO      Take 40 mg by mouth daily.        lisinopril-hydrochlorothiazide 10-12.5 MG  per tablet    PRINZIDE/ZESTORETIC    180 tablet    Take 2 tablets by mouth daily    Essential hypertension       LORazepam 0.5 MG tablet    ATIVAN     0.5 mg as needed Reported on 5/1/2017        LUNESTA 3 MG tablet   Generic drug:  eszopiclone     0    1 TABLET AT BEDTIME PRN        metFORMIN 500 MG tablet    GLUCOPHAGE    450 tablet    TAKE 3 TABLETS BY MOUTH IN THE AM AND 2 TABS IN THE PM.    Type 2 diabetes mellitus with complication, with long-term current use of insulin (H)       metoprolol tartrate 100 MG tablet    LOPRESSOR    180 tablet    TAKE 1 TABLET (100 MG) BY MOUTH 2 TIMES DAILY    Essential hypertension       Multi-vitamin Tabs tablet   Generic drug:  multivitamin, therapeutic with minerals     0    1 TAB PO QD        olopatadine 0.1 % ophthalmic solution    PATANOL    5 mL    PLACE 1 DROP INTO BOTH EYES 2 TIMES DAILY    Conjunctivitis, allergic, bilateral       * order for DME     1 each    Equipment being ordered: elastic ankle brace    Left foot pain       * order for DME     1 each    Equipment being ordered: post op shoe    Left foot pain       STATIN NOT PRESCRIBED (INTENTIONAL)      1 each daily Please choose reason not prescribed, below    Uncontrolled type 2 diabetes mellitus with complication, with long-term current use of insulin (H)       sulfamethoxazole-trimethoprim 800-160 MG per tablet    BACTRIM DS/SEPTRA DS    6 tablet    Take 1 tablet by mouth 2 times daily for 3 days    Acute cystitis without hematuria       UNKNOWN MED DOSAGE     0    FOLIC ACID- 1 TABLET DAILY    OTC -PATIENT CHOICE       vitamin D 1000 UNITS capsule      1 CAPSULE DAILY        * Notice:  This list has 2 medication(s) that are the same as other medications prescribed for you. Read the directions carefully, and ask your doctor or other care provider to review them with you.

## 2018-03-06 NOTE — PROGRESS NOTES
SUBJECTIVE:   Coty Myles is a 48 year old female who presents to clinic today for the following health issues:    Spoke with patient regarding her HM, she is seeing her PCP the week of April 2018 and will take care of all of it then,    URINARY TRACT SYMPTOMS  Onset: 2-3 weeks    Description:   Painful urination (Dysuria): no   Blood in urine (Hematuria): no   Delay in urine (Hesitency): no     Intensity:     Progression of Symptoms:  Incontenence problem    Accompanying Signs & Symptoms:  Fever/chills: no   Flank pain right flank pain  Nausea and vomiting: no   Any vaginal symptoms: none  Abdominal/Pelvic Pain: YES- LRQ    History:   History of frequent UTI's: no   History of kidney stones: many years ago  Sexually Active: YES  Possibility of pregnancy: No    Precipitating factors:       Therapies Tried and outcome: no.    She had gone 15 months without a period  February 10 she got a period that lasted 10 days  After period developed urinary frequency with urgency and had episodes of incontinence  Denies fever, chills, nausea, vomiting  She is overdue for diagnostic pap  11/27/2015: ASCUS + HR HPV. Advised a colposcopy which patient declined  2/27/2017: NIL + HR HPV. Saw Dr. Li 4/5/2017. Cervical polyp removed. Patient declined colposcopy. Advised ok to repeat cotesting in 1 year      Fasting glucose usually 160s  PM Post prandial 200s  Last A1c 9.5%  Trying to eat more vegetables, exercise more  Yesterday, am glucose 164  Has not noticed increase glucose beyond what is usual for her  Of note, her primary care provider is Dr. Gates  She was referred to endocrine several times by her primary care provider for poorly controlled diabetes      Problem list and histories reviewed & adjusted, as indicated.  Additional history: none    Patient Active Problem List   Diagnosis     Attention deficit disorder     Sciatica     Hypothyroidism     iamLUMBOSACRAL NEURITIS NOS     Hyperlipidemia LDL goal <100      Hypertension goal BP (blood pressure) < 130/80     Moderate major depression (H)     Family history of ischemic heart disease     Intermittent asthma     ASCUS with positive high risk HPV cervical     Morbid obesity (H)     Headache     Uncontrolled type 2 diabetes mellitus with complication (H)     Shoulder pain, left     Past Surgical History:   Procedure Laterality Date     ENT SURGERY       ORTHOPEDIC SURGERY       SURGICAL HISTORY OF -   1990    right knee arthroscopic     SURGICAL HISTORY OF - 2000    lump on neck removed       Social History   Substance Use Topics     Smoking status: Never Smoker     Smokeless tobacco: Never Used     Alcohol use Yes      Comment: 3 to 4 per year     Family History   Problem Relation Age of Onset     HEART DISEASE Father      heart attack at 64     DIABETES Father      C.A.D. Father      DIABETES Paternal Grandmother      CANCER Paternal Grandfather      Lung CA     DIABETES Maternal Grandmother      DIABETES Mother      Gynecology Mother      hysterectomy- ovarian cyst     Respiratory Mother      COPD- dependent on oxygen, passed away at age 69     Allergies Mother      to percocet     DIABETES Paternal Aunt      DIABETES Paternal Uncle      Alcohol/Drug Paternal Uncle      DIABETES Brother      CEREBROVASCULAR DISEASE No family hx of          Current Outpatient Prescriptions   Medication Sig Dispense Refill     sulfamethoxazole-trimethoprim (BACTRIM DS/SEPTRA DS) 800-160 MG per tablet Take 1 tablet by mouth 2 times daily for 3 days 6 tablet 0     metoprolol tartrate (LOPRESSOR) 100 MG tablet TAKE 1 TABLET (100 MG) BY MOUTH 2 TIMES DAILY 180 tablet 1     olopatadine (PATANOL) 0.1 % ophthalmic solution PLACE 1 DROP INTO BOTH EYES 2 TIMES DAILY 5 mL 3     exenatide ER (BYDUREON) 2 MG pen Inject 2 mg Subcutaneous every 7 days 3 each 3     metFORMIN (GLUCOPHAGE) 500 MG tablet TAKE 3 TABLETS BY MOUTH IN THE AM AND 2 TABS IN THE PM. 450 tablet 0     insulin lispro prot & lispro  (HUMALOG MIX 75/25 KWIKPEN) injection INJECT 50 UNITS UNDER THE SKIN TWICE DAILY. 45 mL 3     BD FRANCOISE U/F 32G X 4 MM insulin pen needle USE TWICE DAILY 20100 each 0     fluticasone (FLONASE) 50 MCG/ACT spray Spray 1-2 sprays into both nostrils daily 1 Bottle 11     lisinopril-hydrochlorothiazide (PRINZIDE/ZESTORETIC) 10-12.5 MG per tablet Take 2 tablets by mouth daily 180 tablet 1     STATIN NOT PRESCRIBED, INTENTIONAL, 1 each daily Please choose reason not prescribed, below       blood glucose monitoring (NO BRAND SPECIFIED) test strip 2-3 times daily testing 1 Box 3     levothyroxine (SYNTHROID/LEVOTHROID) 88 MCG tablet Take 1 tablet (88 mcg) by mouth daily 90 tablet 3     blood glucose monitoring (ACCU-CHEK MULTICLIX) lancets Testing twice daily. 1 Box 12     albuterol (ALBUTEROL) 108 (90 BASE) MCG/ACT inhaler Inhale 2 puffs into the lungs every 6 hours 1 Inhaler 0     Escitalopram Oxalate (LEXAPRO PO) Take 40 mg by mouth daily.       VITAMIN D 1000 UNIT PO CAPS 1 CAPSULE DAILY       LUNESTA 3 MG OR TABS 1 TABLET AT BEDTIME PRN 0 0     UNKNOWN MED DOSAGE FOLIC ACID- 1 TABLET DAILY 0 0     MULTI-VITAMIN OR TABS 1 TAB PO QD 0 0     order for DME Equipment being ordered: post op shoe (Patient not taking: Reported on 3/6/2018) 1 each 0     order for DME Equipment being ordered: elastic ankle brace (Patient not taking: Reported on 3/6/2018) 1 each 0     ACETAMINOPHEN PO Take 325 mg by mouth every 6 hours as needed for pain       blood glucose monitoring (NO BRAND SPECIFIED) meter device kit Use to test blood sugar 2-3 times daily or as directed. 1 kit 0     LORazepam (ATIVAN) 0.5 MG tablet 0.5 mg as needed Reported on 5/1/2017  2     fluticasone (FLOVENT HFA) 110 MCG/ACT inhaler Inhale 2 puffs into the lungs 2 times daily (Patient not taking: Reported on 3/6/2018) 3 Inhaler 1     BP Readings from Last 3 Encounters:   03/06/18 136/90   02/05/18 127/85   10/06/17 131/87    Wt Readings from Last 3 Encounters:   03/06/18  "254 lb (115.2 kg)   10/06/17 254 lb (115.2 kg)   08/01/17 252 lb (114.3 kg)                    Reviewed and updated as needed this visit by clinical staff  Tobacco  Allergies  Meds  Med Hx  Surg Hx  Fam Hx  Soc Hx      Reviewed and updated as needed this visit by Provider         ROS:  Constitutional, HEENT, cardiovascular, pulmonary, gi and gu systems are negative, except as otherwise noted.    OBJECTIVE:     /90 (BP Location: Right arm, Patient Position: Chair, Cuff Size: Adult Regular)  Pulse 97  Temp 97.9  F (36.6  C) (Oral)  Ht 5' 5.5\" (1.664 m)  Wt 254 lb (115.2 kg)  LMP 06/30/2016  SpO2 97%  BMI 41.62 kg/m2  Body mass index is 41.62 kg/(m^2).  GENERAL: healthy, alert and no distress  RESP: lungs clear to auscultation - no rales, rhonchi or wheezes  CV: regular rate and rhythm, normal S1 S2, no S3 or S4, no murmur, click or rub  ABDOMEN: soft, nontender, no hepatosplenomegaly, no masses and bowel sounds normal  BACK: no CVA tenderness    Diagnostic Test Results:  Results for orders placed or performed in visit on 03/06/18 (from the past 24 hour(s))   UA reflex to Microscopic and Culture   Result Value Ref Range    Color Urine Yellow     Appearance Urine Clear     Glucose Urine Negative NEG^Negative mg/dL    Bilirubin Urine Negative NEG^Negative    Ketones Urine Negative NEG^Negative mg/dL    Specific Gravity Urine 1.020 1.003 - 1.035    Blood Urine Small (A) NEG^Negative    pH Urine 6.0 5.0 - 7.0 pH    Protein Albumin Urine Trace (A) NEG^Negative mg/dL    Urobilinogen Urine 0.2 0.2 - 1.0 EU/dL    Nitrite Urine Negative NEG^Negative    Leukocyte Esterase Urine Small (A) NEG^Negative    Source Midstream Urine    Urine Microscopic   Result Value Ref Range    WBC Urine 10-25 (A) OTO5^0 - 5 /HPF    RBC Urine O - 2 OTO2^O - 2 /HPF    Squamous Epithelial /LPF Urine Few FEW^Few /LPF    Bacteria Urine Few (A) NEG^Negative /HPF       ASSESSMENT/PLAN:       ICD-10-CM    1. Dysuria R30.0 UA reflex to " Microscopic and Culture     Urine Microscopic   2. Nonspecific finding on examination of urine R82.90 Urine Culture Aerobic Bacterial   3. Acute cystitis without hematuria N30.00 sulfamethoxazole-trimethoprim (BACTRIM DS/SEPTRA DS) 800-160 MG per tablet   4. Post-menopausal bleeding N95.0 OB/GYN REFERRAL       Will treat for UTI empirically and culture. Also consider that polyuria could be 2/2 to poorly controlled diabetes but she denies worsening control recently. Also denies polydipsia.  I do not understand why she sees me for diabetes while her primary care provider is Dr. Gates and she sees her for all other matters. I explained to her that I am not an endocrinologist and advised that she discussed ongoing diabetes management with her primary care provider as she likely needs management by a specialist. She is overdue for lab workup and several health maintenance measures including diagnostic pap. She declines today. She has annual exam scheduled with her primary care provider  4/2/18 and states she will complete them then. Advised to see GYN for post menopausal bleeding as she previously went 15 months without any period or spotting. Recommend completing pap and HPV cotesting today so she can follow up on those results at this consult but she declines.     CLOVER Gandhi [facility]

## 2018-03-07 LAB
BACTERIA SPEC CULT: ABNORMAL
SPECIMEN SOURCE: ABNORMAL

## 2018-03-08 ENCOUNTER — TELEPHONE (OUTPATIENT)
Dept: FAMILY MEDICINE | Facility: CLINIC | Age: 48
End: 2018-03-08

## 2018-03-08 NOTE — TELEPHONE ENCOUNTER
Reason for call:  Patient reporting a symptom    Symptom or request: Patient was seen by provider at [facility] on Monday and was diagnosed with a UTI. Patient has one more dose of her antibiotics left. Patient woke up this morning with really bad lower back pain on the right side that shoots down her leg. A small amount of right sided abdominal pain. No fever. The pain is making it very challenging for patient to walk.     Duration (how long have symptoms been present): see above    Have you been treated for this before? No    Additional comments: [facility]    Phone Number patient can be reached at:  Home number on file [phone] (home)    Best Time:  any    Can we leave a detailed message on this number:  YES    Call taken on 3/8/2018 at 10:35 AM by Ninoska Muñiz

## 2018-03-08 NOTE — TELEPHONE ENCOUNTER
Patient woke up this morning with back pain that radiates to her knee. This is new for her & she states it's hard to walk. She states her UTI is clearing & she has urinary control again.   She denies fever, signs of infection, injury or moving differently. She states her foot color is normal.   Advised an appt today, patient verbalized understanding; she will check her schedule & call back- CP has openings.  Delia Begum RN

## 2018-03-19 DIAGNOSIS — Z79.4 TYPE 2 DIABETES MELLITUS WITH COMPLICATION, WITH LONG-TERM CURRENT USE OF INSULIN (H): ICD-10-CM

## 2018-03-19 DIAGNOSIS — E11.8 TYPE 2 DIABETES MELLITUS WITH COMPLICATION, WITH LONG-TERM CURRENT USE OF INSULIN (H): ICD-10-CM

## 2018-03-19 NOTE — TELEPHONE ENCOUNTER
Requested Prescriptions   Pending Prescriptions Disp Refills     metFORMIN (GLUCOPHAGE) 500 MG tablet [Pharmacy Med Name: METFORMIN  MG TABLET] 450 tablet 0    Last Written Prescription Date:  10/6/17  Last Fill Quantity: 450,  # refills: 0   Last office visit: 3/6/2018 with prescribing provider:     Future Office Visit:   Next 5 appointments (look out 90 days)     Apr 02, 2018  9:00 AM CDT   Felix Physical Adult [facility]a [facility]    [address] [place] [id number] [phone]                  Sig: TAKE 3 TABLETS BY MOUTH IN THE MORNING AND 2 TABLETS IN THE EVENING.    Biguanide Agents Failed    3/19/2018 10:01 AM       Failed - Blood pressure less than 140/90 in past 6 months    BP Readings from Last 3 Encounters:   03/06/18 136/90   02/05/18 127/85   10/06/17 131/87                Failed - Patient has had a Microalbumin in the past 12 mos.    Recent Labs   Lab Test  01/24/14   1028   MICROL  6   UMALCR  4.51            Failed - Patient has documented A1c within the specified period of time.    Recent Labs   Lab Test  10/06/17   0923   A1C  9.5*            Passed - Patient has documented LDL within the past 12 mos.    Recent Labs   Lab Test  05/01/17   1003   LDL  Cannot estimate LDL when triglyceride exceeds 400 mg/dL  92            Passed - Patient is age 10 or older       Passed - Patient's CR is NOT>1.4 OR Patient's EGFR is NOT<45 within past 12 mos.    Recent Labs   Lab Test  07/06/17   0816  10/19/12   GFR   --    --   90   GFRB   --    --   78   GFRESTIMATED  69   < >   --    GFRESTBLACK  83   < >   --     < > = values in this interval not displayed.       Recent Labs   Lab Test  07/06/17   0816  10/19/12   CR  0.87   < >   --    CRPOC   --    --   0.8    < > = values in this interval not displayed.            Passed - Patient does NOT have a diagnosis of CHF.       Passed - Patient is not pregnant     "   Passed - Patient has not had a positive pregnancy test within the past 12 mos.        Passed - Recent (6 mo) or future (30 days) visit within the authorizing provider's specialty    Patient had office visit in the last 6 months or has a visit in the next 30 days with authorizing provider or within the authorizing provider's specialty.  See \"Patient Info\" tab in inbasket, or \"Choose Columns\" in Meds & Orders section of the refill encounter.              "

## 2018-03-19 NOTE — TELEPHONE ENCOUNTER
Routing refill request to provider for review/approval because:  Failed protocol.  Jaz Robbins RN [facility] Triage.

## 2018-03-20 NOTE — TELEPHONE ENCOUNTER
TC contacted patient and communicated message below. Patient verbalized understanding and will discuss with PCP at upcoming appointment.

## 2018-03-20 NOTE — TELEPHONE ENCOUNTER
Please let patient know that medication was filled for 1 month. She must keep appointment with her primary care provider, Dr. Gates in April. Dr. Gates had previously wanted her to see an endocrinologist because her diabetes is poorly controlled. I am not an endocrinologist. She should discuss ongoing diabetes management with Dr. Gates at her upcoming appointment.

## 2018-03-26 DIAGNOSIS — I10 HYPERTENSION GOAL BP (BLOOD PRESSURE) < 130/80: Chronic | ICD-10-CM

## 2018-03-26 RX ORDER — LEVOTHYROXINE SODIUM 88 UG/1
TABLET ORAL
Qty: 30 TABLET | Refills: 0 | Status: SHIPPED | OUTPATIENT
Start: 2018-03-26 | End: 2018-04-02

## 2018-03-26 NOTE — TELEPHONE ENCOUNTER
"Requested Prescriptions   Pending Prescriptions Disp Refills     levothyroxine (SYNTHROID/LEVOTHROID) 88 MCG tablet [Pharmacy Med Name: LEVOTHYROXINE 88 MCG TABLET]  Last Written Prescription Date:  3/27/2017  Last Fill Quantity: 90 tablet,  # refills: 3   Last office visit: 6/29/2017 with prescribing provider:  HAMIDA Mahoney   Future Office Visit:   Next 5 appointments (look out 90 days)     Apr 02, 2018  9:00 AM CDT   MyChart Physical Adult with Graciela Gates DO   [facility] ([facility]    [address] [place] [id number] [phone]               90 tablet 3     Sig: TAKE 1 TABLET (88 MCG) BY MOUTH DAILY    Thyroid Protocol Failed    3/26/2018  2:14 AM       Failed - Normal TSH on file in past 12 months    Recent Labs   Lab Test  02/27/17   0809   TSH  3.55           Passed - Patient is 12 years or older       Passed - Recent (12 mo) or future (30 days) visit within the authorizing provider's specialty    Patient had office visit in the last 12 months or has a visit in the next 30 days with authorizing provider or within the authorizing provider's specialty.  See \"Patient Info\" tab in inbasket, or \"Choose Columns\" in Meds & Orders section of the refill encounter.           Passed - No active pregnancy on record    If patient is pregnant or has had a positive pregnancy test, please check TSH.         Passed - No positive pregnancy test in past 12 months    If patient is pregnant or has had a positive pregnancy test, please check TSH.            "

## 2018-03-31 ENCOUNTER — HEALTH MAINTENANCE LETTER (OUTPATIENT)
Age: 48
End: 2018-03-31

## 2018-03-31 DIAGNOSIS — E11.9 TYPE 2 DIABETES MELLITUS (H): ICD-10-CM

## 2018-04-01 ENCOUNTER — TRANSFERRED RECORDS (OUTPATIENT)
Dept: HEALTH INFORMATION MANAGEMENT | Facility: CLINIC | Age: 48
End: 2018-04-01

## 2018-04-02 ENCOUNTER — RADIANT APPOINTMENT (OUTPATIENT)
Dept: GENERAL RADIOLOGY | Facility: CLINIC | Age: 48
End: 2018-04-02
Attending: FAMILY MEDICINE
Payer: COMMERCIAL

## 2018-04-02 ENCOUNTER — OFFICE VISIT (OUTPATIENT)
Dept: FAMILY MEDICINE | Facility: CLINIC | Age: 48
End: 2018-04-02
Payer: COMMERCIAL

## 2018-04-02 VITALS
HEART RATE: 84 BPM | DIASTOLIC BLOOD PRESSURE: 88 MMHG | TEMPERATURE: 98.4 F | BODY MASS INDEX: 40.02 KG/M2 | SYSTOLIC BLOOD PRESSURE: 140 MMHG | WEIGHT: 255 LBS | HEIGHT: 67 IN

## 2018-04-02 DIAGNOSIS — M25.511 CHRONIC RIGHT SHOULDER PAIN: ICD-10-CM

## 2018-04-02 DIAGNOSIS — E66.01 MORBID OBESITY (H): Chronic | ICD-10-CM

## 2018-04-02 DIAGNOSIS — Z13.89 SCREENING FOR DIABETIC PERIPHERAL NEUROPATHY: ICD-10-CM

## 2018-04-02 DIAGNOSIS — Z12.11 SCREEN FOR COLON CANCER: ICD-10-CM

## 2018-04-02 DIAGNOSIS — R51.9 CHRONIC NONINTRACTABLE HEADACHE, UNSPECIFIED HEADACHE TYPE: Chronic | ICD-10-CM

## 2018-04-02 DIAGNOSIS — I10 ESSENTIAL HYPERTENSION: ICD-10-CM

## 2018-04-02 DIAGNOSIS — G89.29 CHRONIC NONINTRACTABLE HEADACHE, UNSPECIFIED HEADACHE TYPE: Chronic | ICD-10-CM

## 2018-04-02 DIAGNOSIS — E78.00 HIGH BLOOD CHOLESTEROL: ICD-10-CM

## 2018-04-02 DIAGNOSIS — G89.29 CHRONIC RIGHT SHOULDER PAIN: ICD-10-CM

## 2018-04-02 DIAGNOSIS — E03.9 HYPOTHYROIDISM, UNSPECIFIED TYPE: Chronic | ICD-10-CM

## 2018-04-02 DIAGNOSIS — Z79.4 TYPE 2 DIABETES MELLITUS WITH COMPLICATION, WITH LONG-TERM CURRENT USE OF INSULIN (H): ICD-10-CM

## 2018-04-02 DIAGNOSIS — Z11.3 SCREEN FOR STD (SEXUALLY TRANSMITTED DISEASE): ICD-10-CM

## 2018-04-02 DIAGNOSIS — Z12.4 SCREENING FOR MALIGNANT NEOPLASM OF CERVIX: ICD-10-CM

## 2018-04-02 DIAGNOSIS — M25.512 ACUTE PAIN OF LEFT SHOULDER: ICD-10-CM

## 2018-04-02 DIAGNOSIS — Z00.01 ENCOUNTER FOR ROUTINE ADULT HEALTH EXAMINATION WITH ABNORMAL FINDINGS: Primary | ICD-10-CM

## 2018-04-02 DIAGNOSIS — N93.9 VAGINAL SPOTTING: ICD-10-CM

## 2018-04-02 DIAGNOSIS — E11.8 TYPE 2 DIABETES MELLITUS WITH COMPLICATION, WITH LONG-TERM CURRENT USE OF INSULIN (H): ICD-10-CM

## 2018-04-02 DIAGNOSIS — J45.20 MILD INTERMITTENT ASTHMA WITHOUT COMPLICATION: Chronic | ICD-10-CM

## 2018-04-02 LAB
CREAT UR-MCNC: 255 MG/DL
HBA1C MFR BLD: 8.7 % (ref 0–6.4)
HGB BLD-MCNC: 15.4 G/DL (ref 11.7–15.7)
MICROALBUMIN UR-MCNC: 42 MG/L
MICROALBUMIN/CREAT UR: 16.63 MG/G CR (ref 0–25)

## 2018-04-02 PROCEDURE — 83036 HEMOGLOBIN GLYCOSYLATED A1C: CPT | Performed by: PHARMACIST

## 2018-04-02 PROCEDURE — 84439 ASSAY OF FREE THYROXINE: CPT | Performed by: FAMILY MEDICINE

## 2018-04-02 PROCEDURE — 99214 OFFICE O/P EST MOD 30 MIN: CPT | Mod: 25 | Performed by: FAMILY MEDICINE

## 2018-04-02 PROCEDURE — 36415 COLL VENOUS BLD VENIPUNCTURE: CPT | Performed by: PHARMACIST

## 2018-04-02 PROCEDURE — 87624 HPV HI-RISK TYP POOLED RSLT: CPT | Performed by: FAMILY MEDICINE

## 2018-04-02 PROCEDURE — G0145 SCR C/V CYTO,THINLAYER,RESCR: HCPCS | Performed by: FAMILY MEDICINE

## 2018-04-02 PROCEDURE — 87591 N.GONORRHOEAE DNA AMP PROB: CPT | Performed by: FAMILY MEDICINE

## 2018-04-02 PROCEDURE — 84443 ASSAY THYROID STIM HORMONE: CPT | Performed by: FAMILY MEDICINE

## 2018-04-02 PROCEDURE — 80048 BASIC METABOLIC PNL TOTAL CA: CPT | Performed by: FAMILY MEDICINE

## 2018-04-02 PROCEDURE — 82043 UR ALBUMIN QUANTITATIVE: CPT | Performed by: NURSE PRACTITIONER

## 2018-04-02 PROCEDURE — 80061 LIPID PANEL: CPT | Performed by: FAMILY MEDICINE

## 2018-04-02 PROCEDURE — 73030 X-RAY EXAM OF SHOULDER: CPT | Mod: RT

## 2018-04-02 PROCEDURE — G0124 SCREEN C/V THIN LAYER BY MD: HCPCS | Performed by: FAMILY MEDICINE

## 2018-04-02 PROCEDURE — 87491 CHLMYD TRACH DNA AMP PROBE: CPT | Performed by: FAMILY MEDICINE

## 2018-04-02 PROCEDURE — 85018 HEMOGLOBIN: CPT | Performed by: FAMILY MEDICINE

## 2018-04-02 PROCEDURE — 99396 PREV VISIT EST AGE 40-64: CPT | Performed by: FAMILY MEDICINE

## 2018-04-02 RX ORDER — PRAVASTATIN SODIUM 20 MG
20 TABLET ORAL DAILY
Qty: 30 TABLET | Refills: 0 | Status: SHIPPED | OUTPATIENT
Start: 2018-04-02 | End: 2018-04-30

## 2018-04-02 RX ORDER — FLUTICASONE PROPIONATE 110 UG/1
2 AEROSOL, METERED RESPIRATORY (INHALATION) 2 TIMES DAILY
Qty: 3 INHALER | Refills: 1 | Status: SHIPPED | OUTPATIENT
Start: 2018-04-02 | End: 2018-09-22

## 2018-04-02 RX ORDER — LISINOPRIL/HYDROCHLOROTHIAZIDE 10-12.5 MG
2 TABLET ORAL DAILY
Qty: 180 TABLET | Refills: 1 | Status: SHIPPED | OUTPATIENT
Start: 2018-04-02 | End: 2018-04-02

## 2018-04-02 RX ORDER — LEVOTHYROXINE SODIUM 88 UG/1
TABLET ORAL
Qty: 30 TABLET | Refills: 0 | Status: SHIPPED | OUTPATIENT
Start: 2018-04-02 | End: 2018-04-02

## 2018-04-02 RX ORDER — HYDROCODONE BITARTRATE AND ACETAMINOPHEN 5; 325 MG/1; MG/1
1 TABLET ORAL DAILY PRN
Qty: 40 TABLET | Refills: 0 | Status: SHIPPED | OUTPATIENT
Start: 2018-04-02 | End: 2018-11-14

## 2018-04-02 RX ORDER — LEVOTHYROXINE SODIUM 88 UG/1
TABLET ORAL
Qty: 30 TABLET | Refills: 0 | Status: SHIPPED | OUTPATIENT
Start: 2018-04-02 | End: 2018-11-12

## 2018-04-02 RX ORDER — LISINOPRIL/HYDROCHLOROTHIAZIDE 10-12.5 MG
2 TABLET ORAL DAILY
Qty: 180 TABLET | Refills: 1 | Status: SHIPPED | OUTPATIENT
Start: 2018-04-02 | End: 2018-11-14

## 2018-04-02 RX ORDER — PEN NEEDLE, DIABETIC 32GX 5/32"
NEEDLE, DISPOSABLE MISCELLANEOUS
Qty: 200 EACH | Refills: 1 | Status: SHIPPED | OUTPATIENT
Start: 2018-04-02 | End: 2018-10-17

## 2018-04-02 RX ORDER — FLUTICASONE PROPIONATE 50 MCG
1-2 SPRAY, SUSPENSION (ML) NASAL DAILY
Qty: 1 BOTTLE | Refills: 11 | Status: SHIPPED | OUTPATIENT
Start: 2018-04-02

## 2018-04-02 NOTE — PATIENT INSTRUCTIONS
Begin Pravachol for high cholesterol     [employer] for colon cancer screening is recommended     NORCO prescription given.     Right Shoulder Xray today. I will notify you of final results when I receive them from Radiology.     Referral for Physical Therapy made.     Follow up with ultrasound for vaginal spotting    Here to address other issues-follow up in 3 weeks      Preventive Health Recommendations  Female Ages 50 - 64    Yearly exam: See your health care provider every year in order to  o Review health changes.   o Discuss preventive care.    o Review your medicines if your doctor has prescribed any.      Get a Pap test every three years (unless you have an abnormal result and your provider advises testing more often).    If you get Pap tests with HPV test, you only need to test every 5 years, unless you have an abnormal result.     You do not need a Pap test if your uterus was removed (hysterectomy) and you have not had cancer.    You should be tested each year for STDs (sexually transmitted diseases) if you're at risk.     Have a mammogram every 1 to 2 years.    Have a colonoscopy at age 50, or have a yearly FIT test (stool test). These exams screen for colon cancer.      Have a cholesterol test every 5 years, or more often if advised.    Have a diabetes test (fasting glucose) every three years. If you are at risk for diabetes, you should have this test more often.     If you are at risk for osteoporosis (brittle bone disease), think about having a bone density scan (DEXA).    Shots: Get a flu shot each year. Get a tetanus shot every 10 years.    Nutrition:     Eat at least 5 servings of fruits and vegetables each day.    Eat whole-grain bread, whole-wheat pasta and brown rice instead of white grains and rice.    Talk to your provider about Calcium and Vitamin D.     Lifestyle    Exercise at least 150 minutes a week (30 minutes a day, 5 days a week). This will help you control your weight and  prevent disease.    Limit alcohol to one drink per day.    No smoking.     Wear sunscreen to prevent skin cancer.     See your dentist every six months for an exam and cleaning.    See your eye doctor every 1 to 2 years.    [facility]   Discharged by : Nichelle NICHOLSON MA  Paper scripts provided to patient : Deo     If you have any questions regarding your visit please contact your care team:     Team Gold                Clinic Hours Telephone Number     Dr. Grace Ma NP 7am-7pm  Monday - Thursday   7am-5pm  Fridays  [phone]   (Appointment scheduling available 24/7)     RN Line  [phone] option 2     Urgent Care - [place] and [facility] - 11am-9pm Monday-Friday Saturday-Sunday- 9am-5pm     [place] -   5pm-9pm Monday-Friday Saturday-Sunday- 9am-5pm    [phone] - [place]    [phone] - [place]       For a Price Quote for your services, please call our Consumer Price Line at [phone].     What options do I have for visits at the clinic other than the traditional office visit?     To expand how we care for you, many of our providers are utilizing electronic visits (e-visits) and telephone visits, when medically appropriate, for interactions with their patients rather than a visit in the clinic. We also offer nurse visits for many medical concerns. Just like any other service, we will bill your insurance company for this type of visit based on time spent on the phone with your provider. Not all insurance companies cover these visits. Please check with your medical insurance if this type of visit is covered. You will be responsible for any charges that are not paid by your insurance.   E-visits via [employer]: generally incur a $35.00 fee.     Telephone visits:   Time spent on the phone: *charged based on time that is spent on the phone in increments of 10 minutes. Estimated  cost:   5-10 mins $30.00   11-20 mins. $59.00   21-30 mins. $85.00     Use [employer] (secure email communication and access to your chart) to send your primary care provider a message or make an appointment. Ask someone on your Team how to sign up for [employer].     As always, Thank you for trusting us with your health care needs!      [facility]:    Scheduling Appointments  [facility]  Call: [phone]    [facility]  Call: [phone]    [facility]  Call: [phone]    For Gastroenterology referrals   [facility]enter[facility]   Clinics and Surgery Center, 4th Floor   [address] [place] [address]   Appointments: [phone]    WHERE TO GO FOR CARE?  Clinic    Make an appointment if you:       Are sick (cold, cough, flu, sore throat, earache or in pain).       Have a small injury (sprain, small cut, burn or broken bone).       Need a physical exam, Pap smear, vaccine or prescription refill.       Have questions about your health or medicines.    To reach us:      Call [phone] ([phone]). Open 24 hours every day. (For counseling services, call [phone].)    Log into [employer] at [email or web address]. (Visit [email or web address] to create an account.) Hospital emergency room    An emergency is a serious or life- threatening problem that must be treated right away.    Call 547 or get to the hospital if you have:      Very bad or sudden:            - Chest pain or pressure         - Bleeding         - Head or belly pain         - Dizziness or trouble seeing, walking or                          Speaking      Problems breathing      Blood in your vomit or you are coughing up blood      A major injury (knocked out, loss of a finger or limb, rape, broken bone protruding from skin)    A mental health crisis. (Or call the Mental Health Crisis line at 1-183.170.5290 or Suicide Prevention Hotline at  9-970-838-7865.)    Open 24 hours every day. You don't need an appointment.     Urgent care    Visit urgent care for sickness or small injuries when the clinic is closed. You don't need an appointment. To check hours or find an urgent care near you, visit [email or web address]. Online care    Get online care from [facility] for more than 70 common problems, like colds, allergies and infections. Open 24 hours every day at:   [email or web address]   Need help deciding?    For advice about where to be seen, you may call your clinic and ask to speak with a nurse. We're here for you 24 hours every day.         If you are deaf or hard of hearing, please let us know. We provide many free services including sign language interpreters, oral interpreters, TTYs, telephone amplifiers, note takers and written materials.

## 2018-04-02 NOTE — MR AVS SNAPSHOT
After Visit Summary   4/2/2018    Coty Myles    MRN: [id number]           Patient Information     Date Of Birth          1970        Visit Information        Provider Department      4/2/2018 9:00 AM Graciela Gates DO [facility]        Today's Diagnoses     Encounter for routine adult health examination with abnormal findings    -  1    Hypothyroidism        Type 2 diabetes mellitus with complication, with long-term current use of insulin (H)        High blood cholesterol        Chronic right shoulder pain        Essential hypertension        Hypothyroidism, unspecified type        Mild intermittent asthma without complication        Chronic nonintractable headache, unspecified headache type        Morbid obesity (H)        Screen for colon cancer        Screening for malignant neoplasm of cervix        Screening for diabetic peripheral neuropathy        Vaginal spotting        Screen for STD (sexually transmitted disease)        Acute pain of left shoulder          Care Instructions    Begin Pravachol for high cholesterol     Col[employer] for colon cancer screening is recommended     NORCO prescription given.     Right Shoulder Xray today. I will notify you of final results when I receive them from Radiology.     Referral for Physical Therapy made.     Follow up with ultrasound for vaginal spotting    Here to address other issues-follow up in 3 weeks      Preventive Health Recommendations  Female Ages 50 - 64    Yearly exam: See your health care provider every year in order to  o Review health changes.   o Discuss preventive care.    o Review your medicines if your doctor has prescribed any.      Get a Pap test every three years (unless you have an abnormal result and your provider advises testing more often).    If you get Pap tests with HPV test, you only need to test every 5 years, unless you have an abnormal result.     You do not need a Pap test if your  uterus was removed (hysterectomy) and you have not had cancer.    You should be tested each year for STDs (sexually transmitted diseases) if you're at risk.     Have a mammogram every 1 to 2 years.    Have a colonoscopy at age 50, or have a yearly FIT test (stool test). These exams screen for colon cancer.      Have a cholesterol test every 5 years, or more often if advised.    Have a diabetes test (fasting glucose) every three years. If you are at risk for diabetes, you should have this test more often.     If you are at risk for osteoporosis (brittle bone disease), think about having a bone density scan (DEXA).    Shots: Get a flu shot each year. Get a tetanus shot every 10 years.    Nutrition:     Eat at least 5 servings of fruits and vegetables each day.    Eat whole-grain bread, whole-wheat pasta and brown rice instead of white grains and rice.    Talk to your provider about Calcium and Vitamin D.     Lifestyle    Exercise at least 150 minutes a week (30 minutes a day, 5 days a week). This will help you control your weight and prevent disease.    Limit alcohol to one drink per day.    No smoking.     Wear sunscreen to prevent skin cancer.     See your dentist every six months for an exam and cleaning.    See your eye doctor every 1 to 2 years.            Follow-ups after your visit        Additional Services     [facility] PT, HAND, AND CHIROPRACTIC REFERRAL       **This order will print in the [facility] Scheduling Office**    Physical Therapy, Hand Therapy and Chiropractic Care are available through:    *[facility]  *[facility]  *[facility]    Call one number to schedule at any of the above locations: [phone].    Your provider has referred you to: [facility] - PT and/or Chiropractic Care determined by clinical presentation at [facility] or [facility] Initial Visit    Indication/Reason for Referral: shoulder pain/neck pain  Onset of Illness: chronic  Therapy  Orders: Evaluate and Treat  Special Programs: None  Special Request: None    Sharron Mcdermott      Additional Comments for the Therapist or Chiropractor:       Please be aware that coverage of these services is subject to the terms and limitations of your health insurance plan.  Call member services at your health plan with any benefit or coverage questions.      Please bring the following to your appointment:    *Your personal calendar for scheduling future appointments  *Comfortable clothing            SPORTS MEDICINE REFERRAL       Your provider has referred you to:  [facility] - [facility]  [phone]   [email or web address]    Please be aware that coverage of these services is subject to the terms and limitations of your health insurance plan.  Call member services at your health plan with any benefit or coverage questions.      Please bring the following to your appointment:    >>   Any x-rays, CTs or MRIs which have been performed.  Contact the facility where they were done to arrange for  prior to your scheduled appointment.    >>   List of current medications   >>   This referral request   >>   Any documents/labs given to you for this referral                  Future tests that were ordered for you today     Open Future Orders        Priority Expected Expires Ordered    US Pelvic Complete w Transvaginal Routine  4/2/2019 4/2/2018            Who to contact     If you have questions or need follow up information about today's clinic visit or your schedule please contact [facility] directly at [phone].  Normal or non-critical lab and imaging results will be communicated to you by MyChart, letter or phone within 4 business days after the clinic has received the results. If you do not hear from us within 7 days, please contact the clinic through MyChart or phone. If you  "have a critical or abnormal lab result, we will notify you by phone as soon as possible.  Submit refill requests through [employer] or call your pharmacy and they will forward the refill request to us. Please allow 3 business days for your refill to be completed.          Additional Information About Your Visit        [employer] Information     [employer] gives you secure access to your electronic health record. If you see a primary care provider, you can also send messages to your care team and make appointments. If you have questions, please call your primary care clinic.  If you do not have a primary care provider, please call [phone] and they will assist you.        Care EveryWhere ID     This is your Care EveryWhere ID. This could be used by other organizations to access your [facility] medical records  [id number]        Your Vitals Were     Pulse Temperature Height Last Period Breastfeeding? BMI (Body Mass Index)    84 98.4  F (36.9  C) (Oral) 5' 6.5\" (1.689 m) 06/30/2016 No 40.54 kg/m2       Blood Pressure from Last 3 Encounters:   04/02/18 140/88   03/06/18 136/90   02/05/18 127/85    Weight from Last 3 Encounters:   04/02/18 255 lb (115.7 kg)   03/06/18 254 lb (115.2 kg)   10/06/17 254 lb (115.2 kg)              We Performed the Following     Albumin Random Urine Quantitative     BASIC METABOLIC PANEL     Chlamydia trachomatis PCR     FOOT EXAM  NO CHARGE [36902.114]     Hemoglobin A1c     Hemoglobin     HPV High Risk Types DNA Cervical     [facility] PT, HAND, AND CHIROPRACTIC REFERRAL     Lipid panel reflex to direct LDL Fasting     Neisseria gonorrhoeae PCR     Pap imaged thin layer screen with HPV - recommended age 30 - 65 years (select HPV order below)     SPORTS MEDICINE REFERRAL     TSH WITH FREE T4 REFLEX     XR Shoulder Right G/E 3 Views          Today's Medication Changes          These changes are accurate as of 4/2/18 10:47 AM.  If you have any questions, ask your nurse or doctor.               Start " taking these medicines.        Dose/Directions    HYDROcodone-acetaminophen 5-325 MG per tablet   Commonly known as:  NORCO   Used for:  Acute pain of left shoulder   Started by:  Graciela Gates DO        Dose:  1 tablet   Take 1 tablet by mouth daily as needed for pain   Quantity:  40 tablet   Refills:  0       levothyroxine 88 MCG tablet   Commonly known as:  SYNTHROID/LEVOTHROID   Used for:  Hypothyroidism   Started by:  Graciela Gates DO        TAKE 1 TABLET (88 MCG) BY MOUTH DAILY   Quantity:  30 tablet   Refills:  0       lisinopril-hydrochlorothiazide 10-12.5 MG per tablet   Commonly known as:  PRINZIDE/ZESTORETIC   Used for:  Essential hypertension   Started by:  Graciela Gates DO        Dose:  2 tablet   Take 2 tablets by mouth daily   Quantity:  180 tablet   Refills:  1       pravastatin 20 MG tablet   Commonly known as:  PRAVACHOL   Used for:  Type 2 diabetes mellitus with complication, with long-term current use of insulin (H), High blood cholesterol   Started by:  Graciela Gates DO        Dose:  20 mg   Take 1 tablet (20 mg) by mouth daily   Quantity:  30 tablet   Refills:  0         These medicines have changed or have updated prescriptions.        Dose/Directions    metFORMIN 500 MG tablet   Commonly known as:  GLUCOPHAGE   This may have changed:  See the new instructions.   Used for:  Type 2 diabetes mellitus with complication, with long-term current use of insulin (H)   Changed by:  Graciela Gates DO        TAKE 3 TABLETS BY MOUTH IN THE MORNING AND 2 TABLETS IN THE EVENING.   Quantity:  150 tablet   Refills:  0            Where to get your medicines      These medications were sent to [facility] [id number] [place] [facility] - [place], [place] - [address]  [address] [address]     Phone:  [phone]     exenatide ER 2 MG pen    fluticasone 110 MCG/ACT Inhaler    fluticasone 50 MCG/ACT spray    levothyroxine 88 MCG tablet     lisinopril-hydrochlorothiazide 10-12.5 MG per tablet    metFORMIN 500 MG tablet    pravastatin 20 MG tablet         Some of these will need a paper prescription and others can be bought over the counter.  Ask your nurse if you have questions.     Bring a paper prescription for each of these medications     HYDROcodone-acetaminophen 5-325 MG per tablet                Primary Care Provider Office Phone # Fax #    Graciela Gates [phone]       [address] [address]        Equal Access to Services     ORI JORGE : Hadii aad ku hadasho Soomaali, waaxda luqadaha, qaybta kaalmada adeegyada, waxjacquelyn costello haycarmel benjamin . So [facility] [phone].    ATENCIÓN: Si habla español, tiene a garcia disposición servicios gratuitos de asistencia lingüística. Llame al [phone].    We comply with applicable federal civil rights laws and [place] laws. We do not discriminate on the basis of race, color, national origin, age, disability, sex, sexual orientation, or gender identity.            Thank you!     Thank you for choosing [facility]  for your care. Our goal is always to provide you with excellent care. Hearing back from our patients is one way we can continue to improve our services. Please take a few minutes to complete the written survey that you may receive in the mail after your visit with us. Thank you!             Your Updated Medication List - Protect others around you: Learn how to safely use, store and throw away your medicines at www.disposemymeds.org.          This list is accurate as of 4/2/18 10:47 AM.  Always use your most recent med list.                   Brand Name Dispense Instructions for use Diagnosis    ACETAMINOPHEN PO      Take 325 mg by mouth every 6 hours as needed for pain        albuterol 108 (90 BASE) MCG/ACT Inhaler    PROAIR HFA    1 Inhaler    Inhale 2 puffs into the lungs every 6 hours    Intermittent asthma,  uncomplicated       BD FRANCOISE U/F 32G X 4 MM   Generic drug:  insulin pen needle     201[address] each    USE TWICE DAILY    Type 2 diabetes mellitus (H)       blood glucose monitoring lancets     1 Box    Testing twice daily.    Type 1 diabetes mellitus, uncontrolled (H)       blood glucose monitoring meter device kit    no brand specified    1 kit    Use to test blood sugar 2-3 times daily or as directed.    Uncontrolled type 2 diabetes mellitus with complication, with long-term current use of insulin (H)       blood glucose monitoring test strip    no brand specified    1 Box    2-3 times daily testing    Type 2 diabetes mellitus with complication, with long-term current use of insulin (H)       exenatide ER 2 MG pen    BYDUREON    3 each    Inject 2 mg Subcutaneous every 7 days    Type 2 diabetes mellitus with complication, with long-term current use of insulin (H)       fluticasone 110 MCG/ACT Inhaler    FLOVENT HFA    3 Inhaler    Inhale 2 puffs into the lungs 2 times daily        fluticasone 50 MCG/ACT spray    FLONASE    1 Bottle    Spray 1-2 sprays into both nostrils daily        HYDROcodone-acetaminophen 5-325 MG per tablet    NORCO    40 tablet    Take 1 tablet by mouth daily as needed for pain    Acute pain of left shoulder       insulin lispro prot & lispro injection    HumaLOG MIX 75/25 KWIKPEN    45 mL    INJECT 50 UNITS UNDER THE SKIN TWICE DAILY.    Type 2 diabetes mellitus with complication, with long-term current use of insulin (H)       levothyroxine 88 MCG tablet    SYNTHROID/LEVOTHROID    30 tablet    TAKE 1 TABLET (88 MCG) BY MOUTH DAILY    Hypothyroidism       LEXAPRO PO      Take 40 mg by mouth daily.        lisinopril-hydrochlorothiazide 10-12.5 MG per tablet    PRINZIDE/ZESTORETIC    180 tablet    Take 2 tablets by mouth daily    Essential hypertension       LORazepam 0.5 MG tablet    ATIVAN     0.5 mg as needed Reported on 5/1/2017        LUNESTA 3 MG tablet   Generic drug:  eszopiclone     0    1  TABLET AT BEDTIME PRN        metFORMIN 500 MG tablet    GLUCOPHAGE    150 tablet    TAKE 3 TABLETS BY MOUTH IN THE MORNING AND 2 TABLETS IN THE EVENING.    Type 2 diabetes mellitus with complication, with long-term current use of insulin (H)       metoprolol tartrate 100 MG tablet    LOPRESSOR    180 tablet    TAKE 1 TABLET (100 MG) BY MOUTH 2 TIMES DAILY    Essential hypertension       Multi-vitamin Tabs tablet   Generic drug:  multivitamin, therapeutic with minerals     0    1 TAB PO QD        olopatadine 0.1 % ophthalmic solution    PATANOL    5 mL    PLACE 1 DROP INTO BOTH EYES 2 TIMES DAILY    Conjunctivitis, allergic, bilateral       * order for DME     1 each    Equipment being ordered: elastic ankle brace    Left foot pain       * order for DME     1 each    Equipment being ordered: post op shoe    Left foot pain       pravastatin 20 MG tablet    PRAVACHOL    30 tablet    Take 1 tablet (20 mg) by mouth daily    Type 2 diabetes mellitus with complication, with long-term current use of insulin (H), High blood cholesterol       STATIN NOT PRESCRIBED (INTENTIONAL)      1 each daily Please choose reason not prescribed, below    Uncontrolled type 2 diabetes mellitus with complication, with long-term current use of insulin (H)       UNKNOWN MED DOSAGE     0    FOLIC ACID- 1 TABLET DAILY    OTC -PATIENT CHOICE       vitamin D 1000 UNITS capsule      1 CAPSULE DAILY        * Notice:  This list has 2 medication(s) that are the same as other medications prescribed for you. Read the directions carefully, and ask your doctor or other care provider to review them with you.

## 2018-04-02 NOTE — TELEPHONE ENCOUNTER
"Requested Prescriptions   Pending Prescriptions Disp Refills     BD FRANCOISE U/F 32G X 4 MM insulin pen needle [Pharmacy Med Name: BD ULTRA-FINE PEN NDL [id number]]  Last Written Prescription Date:  10/3/2017  Last Fill Quantity: 19231 EACH,  # refills: 0   Last office visit: 4/2/2018 with prescribing provider:  CANELO HERNANDEZ   Future Office Visit:      0     Sig: USE TWICE DAILY    Diabetic Supplies Protocol Passed    3/31/2018  3:22 PM       Passed - Patient is 18 years of age or older       Passed - Recent (6 mo) or future (30 days) visit within the authorizing provider's specialty    Patient had office visit in the last 6 months or has a visit in the next 30 days with authorizing provider.  See \"Patient Info\" tab in inbasket, or \"Choose Columns\" in Meds & Orders section of the refill encounter.              "

## 2018-04-02 NOTE — TELEPHONE ENCOUNTER
Prescription approved per [facility] Refill Protocol.  Delia Begum RN

## 2018-04-02 NOTE — LETTER
"April 10, 2018      Coty Myles  [address] [address]        Dear Coty,     Your cholesterol is abnormal, please use the recommendations below and recheck labs in 3-6 months   Your thyroid lab is slighly off, I would recommend rechecking in 8 weeks.   Otherwise normal labs     Ways to improve your cholesterol...     1- Eats less saturated fats (including avoiding \"trans\" fats).     2 - Eat more unsaturated fats  - found in vegetables, grains, and tree nuts.   Also by replacing butter with canola oil or olive oil.     3 - Eat more nuts.   1-2 ounces (a small handful) of almonds, walnuts, hazelnuts or pecans once a day in place of other less healthy snacks.     4 - Eat more high fiber foods - vegetables and whole grains including oat bran, oats, beans, peas, and flax seed.     5 - Eat more fish - such as salmon, tuna, mackerel, and sardines.  1 or 2 six ounce servings per week is a healthy replacement for other proteins.     6 - Exercise for at least 120 minutes per week - which is equal to 30 minutes 4 days per week.     Enclosed are your results,     Results for orders placed or performed in visit on 04/02/18   XR Shoulder Right G/E 3 Views    Narrative    XR SHOULDER RT G/E 3 VW 4/2/2018 10:17 AM    HISTORY: ; Chronic right shoulder pain; Chronic right shoulder pain      Impression    IMPRESSION: Negative.    KERRIE BLAS MD   BASIC METABOLIC PANEL   Result Value Ref Range    Sodium 137 133 - 144 mmol/L    Potassium 4.3 3.4 - 5.3 mmol/L    Chloride 104 94 - 109 mmol/L    Carbon Dioxide 23 20 - 32 mmol/L    Anion Gap 10 3 - 14 mmol/L    Glucose 140 (H) 70 - 99 mg/dL    Urea Nitrogen 23 7 - 30 mg/dL    Creatinine 0.98 0.52 - 1.04 mg/dL    GFR Estimate 60 (L) >60 mL/min/1.7m2    GFR Estimate If Black 72 >60 mL/min/1.7m2    Calcium 9.2 8.5 - 10.1 mg/dL   Lipid panel reflex to direct LDL Fasting   Result Value Ref Range    Cholesterol 234 (H) <200 mg/dL    Triglycerides 292 (H) <150 mg/dL "    HDL Cholesterol 46 (L) >49 mg/dL    LDL Cholesterol Calculated 130 (H) <100 mg/dL    Non HDL Cholesterol 188 (H) <130 mg/dL   TSH WITH FREE T4 REFLEX   Result Value Ref Range    TSH 5.38 (H) 0.40 - 4.00 mU/L   Pap imaged thin layer screen with HPV - recommended age 30 - 65 years (select HPV order below)   Result Value Ref Range    PAP ASC-US (A)     Copath Report         Patient Name: MIKE PADILLA  MR#: [id number]  Specimen #: [id number]  Collected: 4/2/2018  Received: 4/3/2018  Reported: 4/4/2018 20:31  Ordering Phy(s): MALKA HERNANDEZ    For improved result formatting, select 'View Enhanced Report Format' under   Linked Documents section.    SPECIMEN/STAIN PROCESS:  Pap imaged thin layer prep screening (Surepath, FocalPoint with guided   screening)       Pap-Cyto x 1, HPV ordered x 1    SOURCE: Cervical, endocervical  ----------------------------------------------------------------   Pap imaged thin layer prep screening (Surepath, FocalPoint with guided   screening)  SPECIMEN ADEQUACY:  Satisfactory for evaluation.  -Transformation zone component present.    CYTOLOGIC INTERPRETATION:    Epithelial cell abnormality:  squamous cell:  atypical squamous cells-of   undetermined significance (ASC-US).    Electronically signed out by:    Lidia Ernst M.D.    Processed and screened at [facility]    CLINICAL HISTORY:  LMP: 6/30/2016  Previous normal pap  Date of Last Pap: 2/27/2017,    Papanicolaou Test Limitations:  Cervical cytology is a screening test with   limited sensitivity; regular  screening is critical for cancer prevention; Pap tests are primarily   effective for the diagnosis/prevention of  squamous cell carcinoma, not adenocarcinomas or other cancers.    TESTING LAB LOCATION:  [address]  [phone]    COLLECTION SITE:  Client:  [facility],  [place]  Location: [facility] (B)     HPV High Risk Types DNA Cervical   Result Value Ref Range    HPV Source SurePath     HPV 16 DNA Negative NEG^Negative    HPV 18 DNA Negative NEG^Negative    Other HR HPV Positive (A) NEG^Negative    Final Diagnosis       This patient's sample is positive for other HR HPV DNA (types 31, 33, 35, 39, 45, 51, 52,   56, 58, 59, 66 or 68), not HPV 16 or HPV 18 DNA. This result requires clinical correlation   with concurrent cytology findings.      Specimen Description Cervical Cells    Albumin Random Urine Quantitative   Result Value Ref Range    Creatinine Urine 255 mg/dL    Albumin Urine mg/L 42 mg/L    Albumin Urine mg/g Cr 16.63 0 - 25 mg/g Cr   Hemoglobin A1c   Result Value Ref Range    Hemoglobin A1C 8.7 (H) 0 - 6.4 %   Hemoglobin   Result Value Ref Range    Hemoglobin 15.4 11.7 - 15.7 g/dL   T4 free   Result Value Ref Range    T4 Free 0.92 0.76 - 1.46 ng/dL   Chlamydia trachomatis PCR   Result Value Ref Range    Specimen Description Cervix     Chlamydia Trachomatis PCR Negative NEG^Negative   Neisseria gonorrhoeae PCR   Result Value Ref Range    Specimen Descrip Cervix     N Gonorrhea PCR Negative NEG^Negative     Please call the clinic if you have any questions.       Sincerely,      Graciela Gates D.O./shreyas

## 2018-04-03 LAB
ANION GAP SERPL CALCULATED.3IONS-SCNC: 10 MMOL/L (ref 3–14)
BUN SERPL-MCNC: 23 MG/DL (ref 7–30)
C TRACH DNA SPEC QL NAA+PROBE: NEGATIVE
CALCIUM SERPL-MCNC: 9.2 MG/DL (ref 8.5–10.1)
CHLORIDE SERPL-SCNC: 104 MMOL/L (ref 94–109)
CHOLEST SERPL-MCNC: 234 MG/DL
CO2 SERPL-SCNC: 23 MMOL/L (ref 20–32)
CREAT SERPL-MCNC: 0.98 MG/DL (ref 0.52–1.04)
GFR SERPL CREATININE-BSD FRML MDRD: 60 ML/MIN/1.7M2
GLUCOSE SERPL-MCNC: 140 MG/DL (ref 70–99)
HDLC SERPL-MCNC: 46 MG/DL
LDLC SERPL CALC-MCNC: 130 MG/DL
N GONORRHOEA DNA SPEC QL NAA+PROBE: NEGATIVE
NONHDLC SERPL-MCNC: 188 MG/DL
POTASSIUM SERPL-SCNC: 4.3 MMOL/L (ref 3.4–5.3)
SODIUM SERPL-SCNC: 137 MMOL/L (ref 133–144)
SPECIMEN SOURCE: NORMAL
SPECIMEN SOURCE: NORMAL
T4 FREE SERPL-MCNC: 0.92 NG/DL (ref 0.76–1.46)
TRIGL SERPL-MCNC: 292 MG/DL
TSH SERPL DL<=0.005 MIU/L-ACNC: 5.38 MU/L (ref 0.4–4)

## 2018-04-03 ASSESSMENT — ASTHMA QUESTIONNAIRES: ACT_TOTALSCORE: 23

## 2018-04-03 ASSESSMENT — PATIENT HEALTH QUESTIONNAIRE - PHQ9: SUM OF ALL RESPONSES TO PHQ QUESTIONS 1-9: 8

## 2018-04-04 LAB
COPATH REPORT: ABNORMAL
PAP: ABNORMAL

## 2018-04-06 LAB
FINAL DIAGNOSIS: ABNORMAL
HPV HR 12 DNA CVX QL NAA+PROBE: POSITIVE
HPV16 DNA SPEC QL NAA+PROBE: NEGATIVE
HPV18 DNA SPEC QL NAA+PROBE: NEGATIVE
SPECIMEN DESCRIPTION: ABNORMAL
SPECIMEN SOURCE CVX/VAG CYTO: ABNORMAL

## 2018-04-09 ENCOUNTER — TELEPHONE (OUTPATIENT)
Dept: FAMILY MEDICINE | Facility: CLINIC | Age: 48
End: 2018-04-09

## 2018-04-10 NOTE — TELEPHONE ENCOUNTER
"Notes Recorded by Jessica Tse RN on 4/9/2018 at 3:20 PM  FYI to Dr. Gates,   Pt. Advised of results and recommendation for colposcopy.  Pap results and HPV were discussed at length, due to patient wanting to know if current pap/hpv results were the same or worse than previous results. Patient states, \"There is only so much I will allow the doctors to do down there, or I feel like it's rape\". RN asked patient if there is anything that we, as her medical team, can to do help with how she feels about the needed follow up. Patient replied, \"no\". RN asked if there were any questions she had in regards to the results or follow up and patient again stated, \"no\". Patient stated, \"I will call back to schedule when I am ready to.\" RN thanked patient for her time and asked if there was anything else I could do for her. Patient replied, \"no\" and then hung up.     SARATH Stevens, RN - Pap Tracking Nurse  ------    Notes Recorded by Jessica Tse RN on 4/9/2018 at 2:14 PM  Left message for patient to return my call to [phone].    SARATH Stevens, RN  Pap Tracking Nurse    ------    Notes Recorded by Jesscia Tse RN on 4/9/2018 at 12:57 PM  Per forwarded result msg from Dr. Gates, \"I would recommend that she meet with gyn at th minimum to review her history\".  ------    Notes Recorded by Jessica Tse RN on 4/9/2018 at 8:41 AM  Navi Gates,   Tarah/Dr. Huizar  ** Per [facility] [place] [place] guidelines, dx pap cc'd to provider as FYI **    47 yo with hx of abnormal pap updated in problem list. Current pap is ASCUS, + HR HPV (not 16 or 18).   Per [facility] guidelines, RN will contact patient and advise her of results and need for colposcopy follow up.   Per visit notes, patient was also advised to follow up with Gyn due to cervical polyp and recent hx of vaginal spotting.   Of note, patient has expressed high level of anxiety with pelvic examination and has been noncompliant with colposcopy recommendations in the " past.     Thank you,  Jessica Tse, BSN, RN  Pap Tracking Nurse

## 2018-04-10 NOTE — PROGRESS NOTES
"Your cholesterol is abnormal, please use the recommendations below and recheck labs in 3-6 months  Your thyroid lab is slighly off, I would recommend rechecking in 8 weeks.  Otherwise normal labs    Ways to improve your cholesterol...    1- Eats less saturated fats (including avoiding \"trans\" fats).    2 - Eat more unsaturated fats  - found in vegetables, grains, and tree nuts.   Also by replacing butter with canola oil or olive oil.    3 - Eat more nuts.   1-2 ounces (a small handful) of almonds, walnuts, hazelnuts or pecans once a  day in place of other less healthy snacks.    4 - Eat more high fiber foods - vegetables and whole grains including oat bran, oats, beans, peas, and flax seed.    5 - Eat more fish - such as salmon, tuna, mackerel, and sardines.  1 or 2 six ounce servings per week is a healthy replacement for other proteins.    6 - Exercise for at least 120 minutes per week - which is equal to 30 minutes 4 days per week.    Graciela Gates D.O.  "

## 2018-04-30 DIAGNOSIS — Z79.4 TYPE 2 DIABETES MELLITUS WITH COMPLICATION, WITH LONG-TERM CURRENT USE OF INSULIN (H): ICD-10-CM

## 2018-04-30 DIAGNOSIS — E11.8 TYPE 2 DIABETES MELLITUS WITH COMPLICATION, WITH LONG-TERM CURRENT USE OF INSULIN (H): ICD-10-CM

## 2018-04-30 DIAGNOSIS — E78.00 HIGH BLOOD CHOLESTEROL: ICD-10-CM

## 2018-04-30 NOTE — TELEPHONE ENCOUNTER
"Requested Prescriptions   Pending Prescriptions Disp Refills     pravastatin (PRAVACHOL) 20 MG tablet [Pharmacy Med Name: PRAVASTATIN SODIUM 20 MG TAB]  Last Written Prescription Date:  4/2/2018  Last Fill Quantity: 30 tablet,  # refills: 0   Last office visit: 4/2/2018 with prescribing provider:  CANELO Gates   Future Office Visit:     30 tablet 0     Sig: TAKE 1 TABLET (20 MG) BY MOUTH DAILY    Statins Protocol Passed    4/30/2018  1:34 AM       Passed - LDL on file in past 12 months    Recent Labs   Lab Test  04/02/18   0910   LDL  130*          Passed - No abnormal creatine kinase in past 12 months    Recent Labs   Lab Test  05/01/17   1004   CKT  212           Passed - Recent (12 mo) or future (30 days) visit within the authorizing provider's specialty    Patient had office visit in the last 12 months or has a visit in the next 30 days with authorizing provider or within the authorizing provider's specialty.  See \"Patient Info\" tab in inbasket, or \"Choose Columns\" in Meds & Orders section of the refill encounter.           Passed - Patient is age 18 or older       Passed - No active pregnancy on record       Passed - No positive pregnancy test in past 12 months          "

## 2018-05-01 NOTE — TELEPHONE ENCOUNTER
Route to PCP. Per OV note patient is to return in 3-6 months, but only ordered for 30 days at appt. Please advise.    Barry Pedroza RN

## 2018-05-03 RX ORDER — PRAVASTATIN SODIUM 20 MG
TABLET ORAL
Qty: 30 TABLET | Refills: 0 | Status: SHIPPED | OUTPATIENT
Start: 2018-05-03 | End: 2018-05-31

## 2018-05-13 DIAGNOSIS — Z79.4 TYPE 2 DIABETES MELLITUS WITH COMPLICATION, WITH LONG-TERM CURRENT USE OF INSULIN (H): ICD-10-CM

## 2018-05-13 DIAGNOSIS — E11.8 TYPE 2 DIABETES MELLITUS WITH COMPLICATION, WITH LONG-TERM CURRENT USE OF INSULIN (H): ICD-10-CM

## 2018-05-14 ENCOUNTER — TELEPHONE (OUTPATIENT)
Dept: FAMILY MEDICINE | Facility: CLINIC | Age: 48
End: 2018-05-14

## 2018-05-14 DIAGNOSIS — E03.9 HYPOTHYROIDISM: Primary | ICD-10-CM

## 2018-05-14 RX ORDER — LEVOTHYROXINE SODIUM 88 UG/1
TABLET ORAL
Qty: 30 TABLET | Refills: 0 | Status: SHIPPED | OUTPATIENT
Start: 2018-05-14 | End: 2018-06-17

## 2018-05-14 NOTE — TELEPHONE ENCOUNTER
Called patient and left a VM message that patient needs to schedule a lab appointment.    Melissa Dhillon

## 2018-05-14 NOTE — TELEPHONE ENCOUNTER
TC- please call to inform & schedule patient. 4/2 result note says: Your thyroid lab is slighly off, I would recommend rechecking in 8 weeks. Ordered lab & sent amira.   Delia Begum RN

## 2018-05-14 NOTE — TELEPHONE ENCOUNTER
"Requested Prescriptions   Pending Prescriptions Disp Refills     levothyroxine (SYNTHROID/LEVOTHROID) 88 MCG tablet [Pharmacy Med Name: LEVOTHYROXINE 88 MCG TABLET]  Last Written Prescription Date:  4/2/2018  Last Fill Quantity: 30 tablet,  # refills: 0   Last office visit: 4/2/2018 with prescribing provider:  CANELO Gates   Future Office Visit:     30 tablet 0     Sig: TAKE 1 TABLET (88 MCG) BY MOUTH DAILY    Thyroid Protocol Failed    5/14/2018  1:40 PM       Failed - Normal TSH on file in past 12 months    Recent Labs   Lab Test  04/02/18   0910   TSH  5.38*             Passed - Patient is 12 years or older       Passed - Recent (12 mo) or future (30 days) visit within the authorizing provider's specialty    Patient had office visit in the last 12 months or has a visit in the next 30 days with authorizing provider or within the authorizing provider's specialty.  See \"Patient Info\" tab in inbasket, or \"Choose Columns\" in Meds & Orders section of the refill encounter.           Passed - No active pregnancy on record    If patient is pregnant or has had a positive pregnancy test, please check TSH.         Passed - No positive pregnancy test in past 12 months    If patient is pregnant or has had a positive pregnancy test, please check TSH.            "

## 2018-05-14 NOTE — TELEPHONE ENCOUNTER
"Requested Prescriptions   Pending Prescriptions Disp Refills     levothyroxine (SYNTHROID/LEVOTHROID) 88 MCG tablet [Pharmacy Med Name: LEVOTHYROXINE 88 MCG TABLET]  Last Written Prescription Date:  4/2/2018  Last Fill Quantity: 30 tablet,  # refills: 0   Last office visit: 4/2/2018 with prescribing provider:  CANELO Gates   Future Office Visit:     30 tablet 0     Sig: TAKE 1 TABLET (88 MCG) BY MOUTH DAILY    Thyroid Protocol Failed    5/14/2018  1:22 AM       Failed - Normal TSH on file in past 12 months    Recent Labs   Lab Test  04/02/18   0910   TSH  5.38*             Passed - Patient is 12 years or older       Passed - Recent (12 mo) or future (30 days) visit within the authorizing provider's specialty    Patient had office visit in the last 12 months or has a visit in the next 30 days with authorizing provider or within the authorizing provider's specialty.  See \"Patient Info\" tab in inbasket, or \"Choose Columns\" in Meds & Orders section of the refill encounter.           Passed - No active pregnancy on record    If patient is pregnant or has had a positive pregnancy test, please check TSH.         Passed - No positive pregnancy test in past 12 months    If patient is pregnant or has had a positive pregnancy test, please check TSH.            "

## 2018-05-14 NOTE — TELEPHONE ENCOUNTER
Requested Prescriptions   Pending Prescriptions Disp Refills     metFORMIN (GLUCOPHAGE) 500 MG tablet [Pharmacy Med Name: METFORMIN  MG TABLET]  Last Written Prescription Date:  4/2/2018  Last Fill Quantity: 150 tablet,  # refills: 0   Last office visit: 4/2/2018 with prescribing provider:  CANELO Gates   Future Office Visit:     150 tablet 0     Sig: TAKE 3 TABLETS BY MOUTH IN THE MORNING AND 2 TABLETS IN THE EVENING.    Biguanide Agents Failed    5/13/2018  9:21 AM       Failed - Blood pressure less than 140/90 in past 6 months    BP Readings from Last 3 Encounters:   04/02/18 140/88   03/06/18 136/90   02/05/18 127/85          Passed - Patient has documented LDL within the past 12 mos.    Recent Labs   Lab Test  04/02/18 0910   LDL  130*            Passed - Patient has had a Microalbumin in the past 12 mos.    Recent Labs   Lab Test  04/02/18 0923   MICROL  42   UMALCR  16.63            Passed - Patient is age 10 or older       Passed - Patient has documented A1c within the specified period of time.    If HgbA1C is 8 or greater, it needs to be on file within the past 3 months.  If less than 8, must be on file within the past 6 months.     Recent Labs   Lab Test  04/02/18 0910   A1C  8.7*            Passed - Patient's CR is NOT>1.4 OR Patient's EGFR is NOT<45 within past 12 mos.    Recent Labs   Lab Test  04/02/18   0910  10/19/12   GFR   --    --   90   GFRB   --    --   78   GFRESTIMATED  60*   < >   --    GFRESTBLACK  72   < >   --     < > = values in this interval not displayed.       Recent Labs   Lab Test  04/02/18   0910  10/19/12   CR  0.98   < >   --    CRPOC   --    --   0.8    < > = values in this interval not displayed.            Passed - Patient does NOT have a diagnosis of CHF.       Passed - Patient is not pregnant       Passed - Patient has not had a positive pregnancy test within the past 12 mos.        Passed - Recent (6 mo) or future (30 days) visit within the authorizing provider's  "specialty    Patient had office visit in the last 6 months or has a visit in the next 30 days with authorizing provider or within the authorizing provider's specialty.  See \"Patient Info\" tab in inbasket, or \"Choose Columns\" in Meds & Orders section of the refill encounter.              "

## 2018-05-22 NOTE — TELEPHONE ENCOUNTER
No more refills until lab work is done.    Called patient and let her know that she needs to have a lab appointment.  Let her know that we have sent a one month refill but she will needs labs done before we can give her more meds.    Melissa Dhillon

## 2018-05-31 DIAGNOSIS — Z79.4 TYPE 2 DIABETES MELLITUS WITH COMPLICATION, WITH LONG-TERM CURRENT USE OF INSULIN (H): ICD-10-CM

## 2018-05-31 DIAGNOSIS — E78.00 HIGH BLOOD CHOLESTEROL: ICD-10-CM

## 2018-05-31 DIAGNOSIS — E11.8 TYPE 2 DIABETES MELLITUS WITH COMPLICATION, WITH LONG-TERM CURRENT USE OF INSULIN (H): ICD-10-CM

## 2018-06-01 RX ORDER — PRAVASTATIN SODIUM 20 MG
TABLET ORAL
Qty: 90 TABLET | Refills: 2 | Status: SHIPPED | OUTPATIENT
Start: 2018-06-01 | End: 2019-03-13

## 2018-06-01 NOTE — TELEPHONE ENCOUNTER
Prescription approved per [facility] Refill Protocol.  Delia Begum RN

## 2018-06-01 NOTE — TELEPHONE ENCOUNTER
"Requested Prescriptions   Pending Prescriptions Disp Refills     pravastatin (PRAVACHOL) 20 MG tablet [Pharmacy Med Name: PRAVASTATIN SODIUM 20 MG TAB]  Last Written Prescription Date:  5/3/2018  Last Fill Quantity: 30 tablet,  # refills: 0   Last office visit: 4/2/2018 with prescribing provider:  CANELO Gates   Future Office Visit:     30 tablet 0     Sig: TAKE 1 TABLET (20 MG) BY MOUTH DAILY    Statins Protocol Passed    5/31/2018  7:06 PM       Passed - LDL on file in past 12 months    Recent Labs   Lab Test  04/02/18   0910   LDL  130*          Passed - No abnormal creatine kinase in past 12 months    Recent Labs   Lab Test  05/01/17   1004   CKT  212           Passed - Recent (12 mo) or future (30 days) visit within the authorizing provider's specialty    Patient had office visit in the last 12 months or has a visit in the next 30 days with authorizing provider or within the authorizing provider's specialty.  See \"Patient Info\" tab in inbasket, or \"Choose Columns\" in Meds & Orders section of the refill encounter.           Passed - Patient is age 18 or older       Passed - No active pregnancy on record       Passed - No positive pregnancy test in past 12 months          "

## 2018-06-12 DIAGNOSIS — E03.9 HYPOTHYROIDISM: ICD-10-CM

## 2018-06-12 RX ORDER — LEVOTHYROXINE SODIUM 88 UG/1
TABLET ORAL
Qty: 30 TABLET | Refills: 0 | OUTPATIENT
Start: 2018-06-12

## 2018-06-12 NOTE — TELEPHONE ENCOUNTER
Patient was notified in 5/14 encounter x2 that she was due for labs. Sent denied with note.  Delia Begum RN

## 2018-06-12 NOTE — TELEPHONE ENCOUNTER
"Requested Prescriptions   Pending Prescriptions Disp Refills     levothyroxine (SYNTHROID/LEVOTHROID) 88 MCG tablet [Pharmacy Med Name: LEVOTHYROXINE 88 MCG TABLET]  Last Written Prescription Date:  5/14/2018  Last Fill Quantity: 30 tabs,  # refills: 0   Last office visit: 4/2/2018 with prescribing provider:  Kody   Future Office Visit:     30 tablet 0     Sig: TAKE 1 TABLET (88 MCG) BY MOUTH DAILY    Thyroid Protocol Failed    6/12/2018  1:30 AM       Failed - Normal TSH on file in past 12 months    Recent Labs   Lab Test  04/02/18   0910   TSH  5.38*             Passed - Patient is 12 years or older       Passed - Recent (12 mo) or future (30 days) visit within the authorizing provider's specialty    Patient had office visit in the last 12 months or has a visit in the next 30 days with authorizing provider or within the authorizing provider's specialty.  See \"Patient Info\" tab in inbasket, or \"Choose Columns\" in Meds & Orders section of the refill encounter.           Passed - No active pregnancy on record    If patient is pregnant or has had a positive pregnancy test, please check TSH.         Passed - No positive pregnancy test in past 12 months    If patient is pregnant or has had a positive pregnancy test, please check TSH.            "

## 2018-06-14 DIAGNOSIS — E03.9 HYPOTHYROIDISM: ICD-10-CM

## 2018-06-14 PROCEDURE — 36415 COLL VENOUS BLD VENIPUNCTURE: CPT | Performed by: FAMILY MEDICINE

## 2018-06-14 PROCEDURE — 84439 ASSAY OF FREE THYROXINE: CPT | Performed by: FAMILY MEDICINE

## 2018-06-14 PROCEDURE — 84443 ASSAY THYROID STIM HORMONE: CPT | Performed by: FAMILY MEDICINE

## 2018-06-15 LAB
T4 FREE SERPL-MCNC: 0.89 NG/DL (ref 0.76–1.46)
TSH SERPL DL<=0.005 MIU/L-ACNC: 6.95 MU/L (ref 0.4–4)

## 2018-06-17 ENCOUNTER — TELEPHONE (OUTPATIENT)
Dept: FAMILY MEDICINE | Facility: CLINIC | Age: 48
End: 2018-06-17

## 2018-06-17 DIAGNOSIS — E03.8 OTHER SPECIFIED HYPOTHYROIDISM: Primary | Chronic | ICD-10-CM

## 2018-06-17 DIAGNOSIS — H10.13 CONJUNCTIVITIS, ALLERGIC, BILATERAL: ICD-10-CM

## 2018-06-18 DIAGNOSIS — E11.8 TYPE 2 DIABETES MELLITUS WITH COMPLICATION, WITH LONG-TERM CURRENT USE OF INSULIN (H): ICD-10-CM

## 2018-06-18 DIAGNOSIS — Z79.4 TYPE 2 DIABETES MELLITUS WITH COMPLICATION, WITH LONG-TERM CURRENT USE OF INSULIN (H): ICD-10-CM

## 2018-06-18 RX ORDER — OLOPATADINE HYDROCHLORIDE 1 MG/ML
SOLUTION/ DROPS OPHTHALMIC
Qty: 5 ML | Refills: 3 | Status: SHIPPED | OUTPATIENT
Start: 2018-06-18 | End: 2018-10-12

## 2018-06-18 NOTE — TELEPHONE ENCOUNTER
"Requested Prescriptions   Pending Prescriptions Disp Refills     olopatadine (PATANOL) 0.1 % ophthalmic solution [Pharmacy Med Name: OLOPATADINE HCL 0.1% EYE DROPS]  Last Written Prescription Date:  1/2/2018  Last Fill Quantity: 5 mL,  # refills: 3   Last office visit: 4/2/2018 with prescribing provider:  CANELO Gates   Future Office Visit:     5 mL 3     Sig: PLACE 1 DROP INTO BOTH EYES 2 TIMES DAILY    Miscellaneous Opthalmic Allergy Drops Protocol Passed    6/17/2018 11:55 AM       Passed - Patient is age 4 or older       Passed - Recent (12 mo) or future (30 days) visit within the authorizing provider's specialty    Patient had office visit in the last 12 months or has a visit in the next 30 days with authorizing provider or within the authorizing provider's specialty.  See \"Patient Info\" tab in [email or web address], or \"Choose Columns\" in Meds & Orders section of the refill encounter.           Passed - Patient is not pregnant       Passed - No positive pregnancy test on record in past 12 mos                levothyroxine (SYNTHROID/LEVOTHROID) 88 MCG tablet [Pharmacy Med Name: LEVOTHYROXINE 88 MCG TABLET]  Last Written Prescription Date:  5/14/2018  Last Fill Quantity: 30 tablet,  # refills: 0   Last office visit: 4/2/2018 with prescribing provider:  Kody LEMOS   Future Office Visit:     30 tablet 0     Sig: TAKE 1 TABLET (88 MCG) BY MOUTH DAILY    Thyroid Protocol Failed    6/17/2018 11:55 AM       Failed - Normal TSH on file in past 12 months    Recent Labs   Lab Test  06/14/18   1004   TSH  6.95*             Passed - Patient is 12 years or older       Passed - Recent (12 mo) or future (30 days) visit within the authorizing provider's specialty    Patient had office visit in the last 12 months or has a visit in the next 30 days with authorizing provider or within the authorizing provider's specialty.  See \"Patient Info\" tab in in[employer]et, or \"Choose Columns\" in Meds & Orders section of the refill encounter.           Passed - " No active pregnancy on record    If patient is pregnant or has had a positive pregnancy test, please check TSH.         Passed - No positive pregnancy test in past 12 months    If patient is pregnant or has had a positive pregnancy test, please check TSH.

## 2018-06-18 NOTE — TELEPHONE ENCOUNTER
Await patient reading lab result note & follow up- it says: recheck in 8 weeks otherwise, please follow up to discuss symptoms and plan. Patanol Prescription approved per [facility] Refill Protocol.  Delia Begum RN

## 2018-06-18 NOTE — TELEPHONE ENCOUNTER
"Requested Prescriptions   Pending Prescriptions Disp Refills     ACCU-CHEK KAYKAY PLUS test strip [Pharmacy Med Name: ACCU-CHEK KAYKAY PLUS TEST STRP] 250 strip 3    Last Written Prescription Date:  5-1-17  Last Fill Quantity: 1,  # refills: 3   Last office visit: 4/2/2018 with prescribing provider:     Future Office Visit:     Sig: TEST 2-3 TIMES DAILY    Diabetic Supplies Protocol Passed    6/18/2018  1:23 AM       Passed - Patient is 18 years of age or older       Passed - Recent (6 mo) or future (30 days) visit within the authorizing provider's specialty    Patient had office visit in the last 6 months or has a visit in the next 30 days with authorizing provider.  See \"Patient Info\" tab in inbasket, or \"Choose Columns\" in Meds & Orders section of the refill encounter.              "

## 2018-06-19 ENCOUNTER — MYC MEDICAL ADVICE (OUTPATIENT)
Dept: FAMILY MEDICINE | Facility: CLINIC | Age: 48
End: 2018-06-19

## 2018-06-19 DIAGNOSIS — E03.9 HYPOTHYROIDISM: Primary | ICD-10-CM

## 2018-06-19 RX ORDER — BLOOD SUGAR DIAGNOSTIC
STRIP MISCELLANEOUS
Qty: 250 STRIP | Refills: 3 | Status: SHIPPED | OUTPATIENT
Start: 2018-06-19

## 2018-06-19 NOTE — TELEPHONE ENCOUNTER
Called patient- she has been eating about 10-15 minutes after taking her levothyroxine. She will try waiting the full 30 minutes and recheck labs in 8 weeks, ordered HM. Patient verbalized understanding.    She is also more stressed since she is getting  and moving next month. She will mention her thyroid is a little off to her therapist since she has noticed a change in her depression/stress.   Delia Begum RN

## 2018-06-19 NOTE — TELEPHONE ENCOUNTER
Prescription approved per [facility] Refill Protocol.  Mariluz Puentes RN  [facility]

## 2018-06-19 NOTE — TELEPHONE ENCOUNTER
Chart reviewed, and patient has not observed labs or note from PCP.  Reached out to patient regarding, left VM that there is a note in her MyChart from PCP and instructed to return call to [facility] RN directly on the RN Call back line at [phone].     Abbey Gallegos RN

## 2018-07-02 RX ORDER — LEVOTHYROXINE SODIUM 88 UG/1
TABLET ORAL
Qty: 90 TABLET | Refills: 0 | Status: SHIPPED | OUTPATIENT
Start: 2018-07-02 | End: 2018-10-11

## 2018-07-19 DIAGNOSIS — I10 ESSENTIAL HYPERTENSION: ICD-10-CM

## 2018-07-20 RX ORDER — METOPROLOL TARTRATE 100 MG
TABLET ORAL
Qty: 180 TABLET | Refills: 1 | Status: SHIPPED | OUTPATIENT
Start: 2018-07-20 | End: 2019-01-19

## 2018-07-28 DIAGNOSIS — E11.8 TYPE 2 DIABETES MELLITUS WITH COMPLICATION, WITH LONG-TERM CURRENT USE OF INSULIN (H): ICD-10-CM

## 2018-07-28 DIAGNOSIS — Z79.4 TYPE 2 DIABETES MELLITUS WITH COMPLICATION, WITH LONG-TERM CURRENT USE OF INSULIN (H): ICD-10-CM

## 2018-07-30 NOTE — TELEPHONE ENCOUNTER
"Requested Prescriptions   Pending Prescriptions Disp Refills     HUMALOG MIX 75/25 KWIKPEN (75-25) 100 UNIT/ML susp [Pharmacy Med Name: HUMALOG MIX 75-25 KWIKPEN]  3    Last Written Prescription Date:  10/6/17  Last Fill Quantity: 45,  # refills: 3   Last office visit: 4/2/2018 with prescribing provider:     Future Office Visit:     Sig: INJECT 50 UNITS UNDER THE SKIN TWICE DAILY.    Insulin Mixes Protocol Failed    7/28/2018  1:20 AM       Failed - Blood pressure less than 140/90 in past 6 months    BP Readings from Last 3 Encounters:   04/02/18 140/88   03/06/18 136/90   02/05/18 127/85                Failed - HgbA1C in past 3 or 6 months    If HgbA1C is 8 or greater, it needs to be on file within the past 3 months.  If less than 8, must be on file within the past 6 months.     Recent Labs   Lab Test  04/02/18   0910   A1C  8.7*            Passed - LDL on file in past 12 months    Recent Labs   Lab Test  04/02/18   0910   LDL  130*            Passed - Microalbumin on file in past 12 months    Recent Labs   Lab Test  04/02/18   0923   MICROL  42   UMALCR  16.63            Passed - Serum creatinine on file in past 12 months    Recent Labs   Lab Test  04/02/18   0910  10/19/12   CR  0.98   < >   --    CRPOC   --    --   0.8    < > = values in this interval not displayed.            Passed - Patient is age 18 or older       Passed - Recent (6 mo) or future (30 days) visit within the authorizing provider's specialty    Patient had office visit in the last 6 months or has a visit in the next 30 days with authorizing provider or within the authorizing provider's specialty.  See \"Patient Info\" tab in inbasket, or \"Choose Columns\" in Meds & Orders section of the refill encounter.              "

## 2018-07-30 NOTE — TELEPHONE ENCOUNTER
Patient was last seen 4/2/18 by Dr. Gates, advised 6 mos follow up.  Due in 3 months.    I cued up 3 mos supply of humalog mix and routed to Dr. Gates to have PCP name on Rx.      Megan Hendrickson RN  [facility]

## 2018-07-31 RX ORDER — INSULIN LISPRO 100 [IU]/ML
INJECTION, SUSPENSION SUBCUTANEOUS
Qty: 45 ML | Refills: 2 | Status: SHIPPED | OUTPATIENT
Start: 2018-07-31 | End: 2018-11-14

## 2018-09-11 ENCOUNTER — TELEPHONE (OUTPATIENT)
Dept: FAMILY MEDICINE | Facility: CLINIC | Age: 48
End: 2018-09-11

## 2018-09-11 NOTE — TELEPHONE ENCOUNTER
Reason for call:  Patient reporting a symptom    Symptom or request: Vaginal Bleeding during menopausal stages    Duration (how long have symptoms been present): 3 days    Have you been treated for this before? No    Additional comments: Patient stated since Sunday she has had vaginal bleeding even though she is going through menopause. Yesterday she had a lot of bleeding and a large clot come out. She asked if a nurse could call her to discuss if this is normal. Please call back.    Phone Number patient can be reached at:  Home number on file [phone] (home)    Best Time:  Anytime    Can we leave a detailed message on this number:  YES    Call taken on 9/11/2018 at 8:27 AM by Nessa Hester

## 2018-09-11 NOTE — TELEPHONE ENCOUNTER
Coty Myles is a 48 year old female who calls with vaginal bleeding.    NURSING ASSESSMENT:  Description:  One spurt a day, yesterday a large clot. Hasn't had a period in over a year or two. Headaches this past week. She was on birth control a year ago and had hormonal migraines.   Onset/duration:  3 days  Pain scale (0-10)   2/10- period cramps (denies pain, fever, pale or moist skin, dizziness or lightheadedness.)  LMP/preg/breast feeding: Had her last period 1-2 years ago     Allergies:   Allergies   Allergen Reactions     Hmg-Coa-R Inhibitors Other (See Comments) and Muscle Pain (Myalgia)     Muscle pains     Meperidine      Percocet [Oxycodone-Acetaminophen]      Family history (mother would get a rash)     Prednisone Other (See Comments)     Night sweats     Trazodone Itching       NURSING PLAN: Nursing advice to patient monitor for symptoms and discussed where to go. She will schedule later this week if she doesn't improve.  Patient verbalized understanding.      RECOMMENDED DISPOSITION:    Will comply with recommendation: Yes  If further questions/concerns or if symptoms do not improve, worsen or new symptoms develop, call your PCP or [facility] as soon as possible.    Guideline used:  Telephone Triage Protocols for Nurses, Fifth Edition, Erma Begum RN

## 2018-09-16 ENCOUNTER — TRANSFERRED RECORDS (OUTPATIENT)
Dept: HEALTH INFORMATION MANAGEMENT | Facility: CLINIC | Age: 48
End: 2018-09-16

## 2018-09-22 ENCOUNTER — TELEPHONE (OUTPATIENT)
Dept: FAMILY MEDICINE | Facility: CLINIC | Age: 48
End: 2018-09-22

## 2018-09-22 DIAGNOSIS — J45.909 ASTHMA: Primary | ICD-10-CM

## 2018-09-24 RX ORDER — DEXAMETHASONE 4 MG/1
TABLET ORAL
Qty: 1 INHALER | Refills: 0 | Status: SHIPPED | OUTPATIENT
Start: 2018-09-24 | End: 2018-11-11

## 2018-09-24 NOTE — TELEPHONE ENCOUNTER
4/2 OV says asthma is controlled, Here to address other issues-follow up in 3 weeks. Patient no showed recent appt and hasn't rescheduled yet. Teresa given.   Delia Begum RN

## 2018-09-24 NOTE — TELEPHONE ENCOUNTER
"Requested Prescriptions   Pending Prescriptions Disp Refills     FLOVENT  MCG/ACT Inhaler [Pharmacy Med Name: FLOVENT  MCG INHALER]  Last Written Prescription Date:  4/2/2018  Last Fill Quantity: 30 inhaler,  # refills: 1   Last office visit: 4/2/2018 with prescribing provider:  CANELO Gates   Future Office Visit:     36 Inhaler 1     Sig: INHALE 2 PUFFS INTO THE LUNGS 2 TIMES DAILY    Inhaled Steroids Protocol Passed    9/22/2018  1:28 AM       Passed - Patient is age 12 or older       Passed - Asthma control assessment score within normal limits in last 6 months    Please review ACT score.   ACT Total Scores 2/27/2017 7/21/2017 4/2/2018   ACT TOTAL SCORE - - -   ASTHMA ER VISITS - - -   ASTHMA HOSPITALIZATIONS - - -   ACT TOTAL SCORE (Goal Greater than or Equal to 20) 25 20 23   In the past 12 months, how many times did you visit the emergency room for your asthma without being admitted to the hospital? 0 0 0   In the past 12 months, how many times were you hospitalized overnight because of your asthma? 0 0 0            Passed - Recent (6 mo) or future (30 days) visit within the authorizing provider's specialty    Patient had office visit in the last 6 months or has a visit in the next 30 days with authorizing provider or within the authorizing provider's specialty.  See \"Patient Info\" tab in inbasket, or \"Choose Columns\" in Meds & Orders section of the refill encounter.              "

## 2018-10-02 ENCOUNTER — TELEPHONE (OUTPATIENT)
Dept: PHARMACY | Facility: CLINIC | Age: 48
End: 2018-10-02

## 2018-10-02 NOTE — TELEPHONE ENCOUNTER
We have been unable to reach this patient for MTM follow-up after several attempts. We will stop reaching out to the patient at this time. Please let us know if we can assist in this patient's care in the future.    Routing to PCP as TITUS Roberts, Pharm.D., BCACP  Medication Therapy Management Pharmacist  [phone]

## 2018-10-06 ENCOUNTER — HEALTH MAINTENANCE LETTER (OUTPATIENT)
Age: 48
End: 2018-10-06

## 2018-10-06 DIAGNOSIS — E03.8 OTHER SPECIFIED HYPOTHYROIDISM: Chronic | ICD-10-CM

## 2018-10-08 RX ORDER — LEVOTHYROXINE SODIUM 88 UG/1
TABLET ORAL
Qty: 90 TABLET | Refills: 0 | OUTPATIENT
Start: 2018-10-08

## 2018-10-08 NOTE — TELEPHONE ENCOUNTER
"Requested Prescriptions   Pending Prescriptions Disp Refills     levothyroxine (SYNTHROID/LEVOTHROID) 88 MCG tablet [Pharmacy Med Name: LEVOTHYROXINE 88 MCG TABLET] 90 tablet 0     Sig: TAKE 1 TABLET BY MOUTH EVERY DAY    Thyroid Protocol Failed    10/6/2018  2:13 AM       Failed - Normal TSH on file in past 12 months    Recent Labs   Lab Test  06/14/18   1004   TSH  6.95*             Passed - Patient is 12 years or older       Passed - Recent (12 mo) or future (30 days) visit within the authorizing provider's specialty    Patient had office visit in the last 12 months or has a visit in the next 30 days with authorizing provider or within the authorizing provider's specialty.  See \"Patient Info\" tab in inbasket, or \"Choose Columns\" in Meds & Orders section of the refill encounter.           Passed - No active pregnancy on record    If patient is pregnant or has had a positive pregnancy test, please check TSH.         Passed - No positive pregnancy test in past 12 months    If patient is pregnant or has had a positive pregnancy test, please check TSH.          Last Written Prescription Date:  7/2/18  Last Fill Quantity: 90,  # refills: 0   Last office visit: 4/2/2018 with prescribing provider:  4/2/18   Future Office Visit:      "

## 2018-10-08 NOTE — TELEPHONE ENCOUNTER
Patient no showed and was notified in 9/22 encounter x3 that she is overdue for a visit and labs. Left a detailed VM. Denied.   Delia Begum RN

## 2018-10-11 DIAGNOSIS — E03.8 OTHER SPECIFIED HYPOTHYROIDISM: Chronic | ICD-10-CM

## 2018-10-11 RX ORDER — LEVOTHYROXINE SODIUM 88 UG/1
TABLET ORAL
Qty: 30 TABLET | Refills: 0 | Status: SHIPPED | OUTPATIENT
Start: 2018-10-11 | End: 2018-11-09

## 2018-10-11 NOTE — TELEPHONE ENCOUNTER
"Requested Prescriptions   Pending Prescriptions Disp Refills     levothyroxine (SYNTHROID/LEVOTHROID) 88 MCG tablet [Pharmacy Med Name: LEVOTHYROXINE 88 MCG TABLET] 90 tablet 0    Last Written Prescription Date:  7/2/18  Last Fill Quantity: 90,  # refills: 0   Last office visit: 4/2/2018 with prescribing provider:  Kody   Future Office Visit:   Next 5 appointments (look out 90 days)     Oct 29, 2018  9:00 AM CDT   Felix Ledezma with Graciela Gates DO   [facility]    [address] [place] [id number]   [phone]                  Sig: TAKE 1 TABLET BY MOUTH EVERY DAY    Thyroid Protocol Failed    10/11/2018  9:53 AM       Failed - Normal TSH on file in past 12 months    Recent Labs   Lab Test  06/14/18   1004   TSH  6.95*             Passed - Patient is 12 years or older       Passed - Recent (12 mo) or future (30 days) visit within the authorizing provider's specialty    Patient had office visit in the last 12 months or has a visit in the next 30 days with authorizing provider or within the authorizing provider's specialty.  See \"Patient Info\" tab in inbasket, or \"Choose Columns\" in Meds & Orders section of the refill encounter.           Passed - No active pregnancy on record    If patient is pregnant or has had a positive pregnancy test, please check TSH.         Passed - No positive pregnancy test in past 12 months    If patient is pregnant or has had a positive pregnancy test, please check TSH.            "

## 2018-10-12 DIAGNOSIS — H10.13 CONJUNCTIVITIS, ALLERGIC, BILATERAL: ICD-10-CM

## 2018-10-12 RX ORDER — OLOPATADINE HYDROCHLORIDE 1 MG/ML
SOLUTION/ DROPS OPHTHALMIC
Qty: 5 ML | Refills: 5 | Status: SHIPPED | OUTPATIENT
Start: 2018-10-12

## 2018-10-12 NOTE — TELEPHONE ENCOUNTER
"Requested Prescriptions   Pending Prescriptions Disp Refills     olopatadine (PATANOL) 0.1 % ophthalmic solution [Pharmacy Med Name: OLOPATADINE HCL 0.1% EYE DROPS] 5 mL 3     Sig: PLACE 1 DROP INTO BOTH EYES 2 TIMES DAILY    Last Written Prescription Date:  06/18/18  Last Fill Quantity: 5ml,  # refills: 3   Last office visit: 4/2/2018 with prescribing provider:     Future Office Visit:   Next 5 appointments (look out 90 days)     Oct 29, 2018  9:00 AM CDT   Felix Ledezma with Graciela Gates DO   [facility]    [address] [place] [id number] [phone]                     Miscellaneous Opthalmic Allergy Drops Protocol Passed    10/12/2018  9:50 AM       Passed - Patient is age 4 or older       Passed - Recent (12 mo) or future (30 days) visit within the authorizing provider's specialty    Patient had office visit in the last 12 months or has a visit in the next 30 days with authorizing provider or within the authorizing provider's specialty.  See \"Patient Info\" tab in inbasket, or \"Choose Columns\" in Meds & Orders section of the refill encounter.           Passed - Patient is not pregnant       Passed - No positive pregnancy test on record in past 12 mos          "

## 2018-10-12 NOTE — TELEPHONE ENCOUNTER
"LOV- 4/2/18. Scheduled 10/29. Prescription approved per [facility] Refill Protocol.    Delia Begum RN    Requested Prescriptions   Pending Prescriptions Disp Refills     olopatadine (PATANOL) 0.1 % ophthalmic solution [Pharmacy Med Name: OLOPATADINE HCL 0.1% EYE DROPS] 5 mL 3     Sig: PLACE 1 DROP INTO BOTH EYES 2 TIMES DAILY    Miscellaneous Opthalmic Allergy Drops Protocol Passed    10/12/2018  9:50 AM       Passed - Patient is age 4 or older       Passed - Recent (12 mo) or future (30 days) visit within the authorizing provider's specialty    Patient had office visit in the last 12 months or has a visit in the next 30 days with authorizing provider or within the authorizing provider's specialty.  See \"Patient Info\" tab in inbasket, or \"Choose Columns\" in Meds & Orders section of the refill encounter.           Passed - Patient is not pregnant       Passed - No positive pregnancy test on record in past 12 mos          "

## 2018-10-17 DIAGNOSIS — E11.9 TYPE 2 DIABETES MELLITUS (H): ICD-10-CM

## 2018-10-17 NOTE — TELEPHONE ENCOUNTER
"Requested Prescriptions   Pending Prescriptions Disp Refills     BD FRANCOISE U/F 32G X 4 MM insulin pen needle [Pharmacy Med Name: BD UF FRANCOISE PEN NEEDLE [id number]]  Last Written Prescription Date:  4/2/2018  Last Fill Quantity: 200 each,  # refills: 1   Last office visit: 4/2/2018 with prescribing provider:  CANELO Gates   Future Office Visit:   Next 5 appointments (look out 90 days)     Oct 29, 2018  9:00 AM CDT   Felix Ledezma with Graciela Gates DO   [facility]    [address] [place] [id number] [phone]                   1     Sig: USE TWICE DAILY    Diabetic Supplies Protocol Passed    10/17/2018 11:53 AM       Passed - Patient is 18 years of age or older       Passed - Recent (6 mo) or future (30 days) visit within the authorizing provider's specialty    Patient had office visit in the last 6 months or has a visit in the next 30 days with authorizing provider.  See \"Patient Info\" tab in inbasket, or \"Choose Columns\" in Meds & Orders section of the refill encounter.              "

## 2018-10-19 RX ORDER — PEN NEEDLE, DIABETIC 32GX 5/32"
NEEDLE, DISPOSABLE MISCELLANEOUS
Qty: 100 EACH | Refills: 0 | Status: SHIPPED | OUTPATIENT
Start: 2018-10-19 | End: 2018-12-15

## 2018-10-31 DIAGNOSIS — Z79.4 TYPE 2 DIABETES MELLITUS WITH COMPLICATION, WITH LONG-TERM CURRENT USE OF INSULIN (H): ICD-10-CM

## 2018-10-31 DIAGNOSIS — E11.8 TYPE 2 DIABETES MELLITUS WITH COMPLICATION, WITH LONG-TERM CURRENT USE OF INSULIN (H): ICD-10-CM

## 2018-10-31 NOTE — TELEPHONE ENCOUNTER
"Requested Prescriptions   Pending Prescriptions Disp Refills     metFORMIN (GLUCOPHAGE) 500 MG tablet [Pharmacy Med Name: METFORMIN  MG TABLET]  Last Written Prescription Date:  5/14/2018  Last Fill Quantity: 450 tabs,  # refills: 1   Last office visit: 4/2/2018 with prescribing provider:  Kody   Future Office Visit:     450 tablet 1     Sig: TAKE 3 TABLETS BY MOUTH IN THE MORNING AND 2 TABLETS IN THE EVENING.    Biguanide Agents Failed    10/31/2018  2:04 AM       Failed - Blood pressure less than 140/90 in past 6 months    BP Readings from Last 3 Encounters:   04/02/18 140/88   03/06/18 136/90   02/05/18 127/85                Failed - Patient has documented A1c within the specified period of time.    If HgbA1C is 8 or greater, it needs to be on file within the past 3 months.  If less than 8, must be on file within the past 6 months.     Recent Labs   Lab Test  04/02/18 0910   A1C  8.7*            Failed - Recent (6 mo) or future (30 days) visit within the authorizing provider's specialty    Patient had office visit in the last 6 months or has a visit in the next 30 days with authorizing provider or within the authorizing provider's specialty.  See \"Patient Info\" tab in inbasket, or \"Choose Columns\" in Meds & Orders section of the refill encounter.           Passed - Patient has documented LDL within the past 12 mos.    Recent Labs   Lab Test  04/02/18   0910   LDL  130*            Passed - Patient has had a Microalbumin in the past 15 mos.    Recent Labs   Lab Test  04/02/18   0923   MICROL  42   UMALCR  16.63            Passed - Patient is age 10 or older       Passed - Patient's CR is NOT>1.4 OR Patient's EGFR is NOT<45 within past 12 mos.    Recent Labs   Lab Test  04/02/18   0910  10/19/12   GFR   --    --   90   GFRB   --    --   78   GFRESTIMATED  60*   < >   --    GFRESTBLACK  72   < >   --     < > = values in this interval not displayed.       Recent Labs   Lab Test  04/02/18   0910  10/19/12 "   CR  0.98   < >   --    CRPOC   --    --   0.8    < > = values in this interval not displayed.            Passed - Patient does NOT have a diagnosis of CHF.       Passed - Patient is not pregnant       Passed - Patient has not had a positive pregnancy test within the past 12 mos.

## 2018-11-01 NOTE — TELEPHONE ENCOUNTER
Patient no showed x2 & hasn't rescheduled. 9/29 encounter told patient she was due for a visit. Denied.  Delia Begum RN

## 2018-11-09 DIAGNOSIS — E03.8 OTHER SPECIFIED HYPOTHYROIDISM: Chronic | ICD-10-CM

## 2018-11-09 NOTE — TELEPHONE ENCOUNTER
"Requested Prescriptions   Pending Prescriptions Disp Refills     levothyroxine (SYNTHROID/LEVOTHROID) 88 MCG tablet [Pharmacy Med Name: LEVOTHYROXINE 88 MCG TABLET]  Last Written Prescription Date:  10/11/2018  Last Fill Quantity: 30 tablet,  # refills: 0   Last office visit: 4/2/2018 with prescribing provider:  CANELO Gates   Future Office Visit:   Next 5 appointments (look out 90 days)     Nov 14, 2018  5:40 PM CST   MyChart Long with Graciela Gates DO   [facility]    [address] [place] [id number] [phone]                  30 tablet 0     Sig: TAKE 1 TABLET BY MOUTH EVERY DAY    Thyroid Protocol Failed    11/9/2018  1:28 AM       Failed - Normal TSH on file in past 12 months    Recent Labs   Lab Test  06/14/18   1004   TSH  6.95*             Passed - Patient is 12 years or older       Passed - Recent (12 mo) or future (30 days) visit within the authorizing provider's specialty    Patient had office visit in the last 12 months or has a visit in the next 30 days with authorizing provider or within the authorizing provider's specialty.  See \"Patient Info\" tab in inbasket, or \"Choose Columns\" in Meds & Orders section of the refill encounter.             Passed - No active pregnancy on record    If patient is pregnant or has had a positive pregnancy test, please check TSH.         Passed - No positive pregnancy test in past 12 months    If patient is pregnant or has had a positive pregnancy test, please check TSH.            "

## 2018-11-11 DIAGNOSIS — J45.20 MILD INTERMITTENT ASTHMA WITHOUT COMPLICATION: Primary | Chronic | ICD-10-CM

## 2018-11-12 NOTE — TELEPHONE ENCOUNTER
"Requested Prescriptions   Pending Prescriptions Disp Refills     FLOVENT  MCG/ACT Inhaler [Pharmacy Med Name: FLOVENT  MCG INHALER]  Last Written Prescription Date:  9/24/2018  Last Fill Quantity: 1 inhaler,  # refills: 0   Last office visit: 4/2/2018 with prescribing provider:  CANELO Gates   Future Office Visit:   Next 5 appointments (look out 90 days)     Nov 14, 2018  5:40 PM CST   MyChart Long with Graciela Gates DO   [facility]    [address] [place] [id number] [phone]                  12 Inhaler 0     Sig: TAKE 2 PUFFS BY MOUTH TWICE A DAY    Inhaled Steroids Protocol Failed    11/11/2018  5:54 PM       Failed - Asthma control assessment score within normal limits in last 6 months    Please review ACT score.     ACT Total Scores 2/27/2017 7/21/2017 4/2/2018   ACT TOTAL SCORE - - -   ASTHMA ER VISITS - - -   ASTHMA HOSPITALIZATIONS - - -   ACT TOTAL SCORE (Goal Greater than or Equal to 20) 25 20 23   In the past 12 months, how many times did you visit the emergency room for your asthma without being admitted to the hospital? 0 0 0   In the past 12 months, how many times were you hospitalized overnight because of your asthma? 0 0 0          Passed - Patient is age 12 or older       Passed - Recent (6 mo) or future (30 days) visit within the authorizing provider's specialty    Patient had office visit in the last 6 months or has a visit in the next 30 days with authorizing provider or within the authorizing provider's specialty.  See \"Patient Info\" tab in inbasket, or \"Choose Columns\" in Meds & Orders section of the refill encounter.              "

## 2018-11-12 NOTE — TELEPHONE ENCOUNTER
"Patient has an appointment with PCP in 2 days.    She has already received a amira fill of levothyroxine as she was supposed to be seen 10/29/18.    She has \"no-showed\" 9/19 and 10/29.    Attempted to call patient at home number, left message on voicemail; patient was instructed to return call to [facility] RN directly on the RN call back line at [phone]     Does she have enough medication to get to the 11/14 visit?   It would be best if she is on the med in order to re-check her TSH.   She has future orders.    I also called the pharmacy to verify when did the patient last  30 day supply of thyroid med; she picked this up on 10/11/18 so probably will be out.    Routing refill request to provider for review/approval because:  Amira given x1 and patient did not follow up, please advise    Megan Hendrickson, RN  [facility]          "

## 2018-11-13 ENCOUNTER — MYC MEDICAL ADVICE (OUTPATIENT)
Dept: FAMILY MEDICINE | Facility: CLINIC | Age: 48
End: 2018-11-13

## 2018-11-13 RX ORDER — LEVOTHYROXINE SODIUM 88 UG/1
TABLET ORAL
Qty: 30 TABLET | Refills: 0 | Status: SHIPPED | OUTPATIENT
Start: 2018-11-13 | End: 2018-11-14

## 2018-11-13 RX ORDER — DEXAMETHASONE 4 MG/1
TABLET ORAL
Qty: 12 INHALER | Refills: 3 | Status: SHIPPED | OUTPATIENT
Start: 2018-11-13

## 2018-11-13 NOTE — TELEPHONE ENCOUNTER
Patient is scheduled tomorrow but has no showed x2 and a amira was given on 9/24. She was called yesterday in another encounter but hasn't responded if she has enough medication to last until tomorrow. Is a amira ok?  Delia Begum RN

## 2018-11-13 NOTE — TELEPHONE ENCOUNTER
Panel Management Review      Patient has the following on her problem list:     Diabetes    ASA: Not Required     Last A1C  Lab Results   Component Value Date    A1C 8.7 04/02/2018    A1C 9.5 10/06/2017    A1C 8.8 07/06/2017    A1C 9.7 05/01/2017    A1C 9.8 02/27/2017     A1C tested: FAILED    Last LDL:    Lab Results   Component Value Date    CHOL 234 04/02/2018     Lab Results   Component Value Date    HDL 46 04/02/2018     Lab Results   Component Value Date     04/02/2018     Lab Results   Component Value Date    TRIG 292 04/02/2018     Lab Results   Component Value Date    CHOLHDLRATIO 2.1 08/28/2015     Lab Results   Component Value Date    NHDL 188 04/02/2018       Is the patient on a Statin? YES             Is the patient on Aspirin? NO    Medications     HMG CoA Reductase Inhibitors    pravastatin (PRAVACHOL) 20 MG tablet    STATIN NOT PRESCRIBED, INTENTIONAL,          Last three blood pressure readings:  BP Readings from Last 3 Encounters:   04/02/18 140/88   03/06/18 136/90   02/05/18 127/85       Date of last diabetes office visit: 4/2/18     Tobacco History:     History   Smoking Status     Never Smoker   Smokeless Tobacco     Never Used           Composite cancer screening  Chart review shows that this patient is due/due soon for the following Pap Smear and Colonoscopy  Summary:    Patient is due/failing the following:   A1C and BP CHECK    Action needed:   Patient needs office visit for Diabetes and BP check.    Type of outreach:    None- patient already has appt scheduled 11/14/18    Questions for provider review:    None                                                                                                                                      Nichelle Low MA

## 2018-11-14 ENCOUNTER — OFFICE VISIT (OUTPATIENT)
Dept: FAMILY MEDICINE | Facility: CLINIC | Age: 48
End: 2018-11-14
Payer: COMMERCIAL

## 2018-11-14 VITALS
WEIGHT: 251 LBS | HEIGHT: 67 IN | SYSTOLIC BLOOD PRESSURE: 116 MMHG | HEART RATE: 84 BPM | DIASTOLIC BLOOD PRESSURE: 78 MMHG | TEMPERATURE: 97.8 F | BODY MASS INDEX: 39.39 KG/M2

## 2018-11-14 DIAGNOSIS — Z11.4 SCREENING FOR HIV (HUMAN IMMUNODEFICIENCY VIRUS): ICD-10-CM

## 2018-11-14 DIAGNOSIS — G89.29 CHRONIC NONINTRACTABLE HEADACHE, UNSPECIFIED HEADACHE TYPE: Chronic | ICD-10-CM

## 2018-11-14 DIAGNOSIS — R51.9 CHRONIC NONINTRACTABLE HEADACHE, UNSPECIFIED HEADACHE TYPE: Chronic | ICD-10-CM

## 2018-11-14 DIAGNOSIS — Z12.4 SCREENING FOR MALIGNANT NEOPLASM OF CERVIX: ICD-10-CM

## 2018-11-14 DIAGNOSIS — Z79.4 TYPE 2 DIABETES MELLITUS WITH COMPLICATION, WITH LONG-TERM CURRENT USE OF INSULIN (H): Primary | ICD-10-CM

## 2018-11-14 DIAGNOSIS — E11.8 TYPE 2 DIABETES MELLITUS WITH COMPLICATION, WITH LONG-TERM CURRENT USE OF INSULIN (H): Primary | ICD-10-CM

## 2018-11-14 DIAGNOSIS — Z12.11 SCREEN FOR COLON CANCER: ICD-10-CM

## 2018-11-14 DIAGNOSIS — E03.8 OTHER SPECIFIED HYPOTHYROIDISM: Chronic | ICD-10-CM

## 2018-11-14 DIAGNOSIS — I10 ESSENTIAL HYPERTENSION: ICD-10-CM

## 2018-11-14 LAB
HBA1C MFR BLD: 9.7 % (ref 0–5.6)
HGB BLD-MCNC: 14.4 G/DL (ref 11.7–15.7)

## 2018-11-14 PROCEDURE — 84443 ASSAY THYROID STIM HORMONE: CPT | Performed by: FAMILY MEDICINE

## 2018-11-14 PROCEDURE — 85018 HEMOGLOBIN: CPT | Performed by: FAMILY MEDICINE

## 2018-11-14 PROCEDURE — 99214 OFFICE O/P EST MOD 30 MIN: CPT | Performed by: FAMILY MEDICINE

## 2018-11-14 PROCEDURE — 80048 BASIC METABOLIC PNL TOTAL CA: CPT | Performed by: FAMILY MEDICINE

## 2018-11-14 PROCEDURE — 87389 HIV-1 AG W/HIV-1&-2 AB AG IA: CPT | Performed by: FAMILY MEDICINE

## 2018-11-14 PROCEDURE — 84439 ASSAY OF FREE THYROXINE: CPT | Performed by: FAMILY MEDICINE

## 2018-11-14 PROCEDURE — 83036 HEMOGLOBIN GLYCOSYLATED A1C: CPT | Performed by: FAMILY MEDICINE

## 2018-11-14 PROCEDURE — 36415 COLL VENOUS BLD VENIPUNCTURE: CPT | Performed by: FAMILY MEDICINE

## 2018-11-14 RX ORDER — LEVOTHYROXINE SODIUM 88 UG/1
88 TABLET ORAL DAILY
Qty: 90 TABLET | Refills: 1 | Status: SHIPPED | OUTPATIENT
Start: 2018-11-14 | End: 2019-04-15

## 2018-11-14 RX ORDER — HYDROCODONE BITARTRATE AND ACETAMINOPHEN 5; 325 MG/1; MG/1
1 TABLET ORAL DAILY PRN
Qty: 40 TABLET | Refills: 0 | Status: SHIPPED | OUTPATIENT
Start: 2018-11-14

## 2018-11-14 RX ORDER — LISINOPRIL/HYDROCHLOROTHIAZIDE 10-12.5 MG
2 TABLET ORAL DAILY
Qty: 180 TABLET | Refills: 1 | Status: SHIPPED | OUTPATIENT
Start: 2018-11-14 | End: 2019-04-15

## 2018-11-14 RX ORDER — INSULIN LISPRO 100 [IU]/ML
INJECTION, SUSPENSION SUBCUTANEOUS
Qty: 45 ML | Refills: 2 | Status: SHIPPED | OUTPATIENT
Start: 2018-11-14 | End: 2019-04-12

## 2018-11-14 ASSESSMENT — PATIENT HEALTH QUESTIONNAIRE - PHQ9: SUM OF ALL RESPONSES TO PHQ QUESTIONS 1-9: 9

## 2018-11-14 NOTE — PROGRESS NOTES
SUBJECTIVE:   Coty Myles is a 48 year old female who presents to clinic today for the following health issues:    remarried since one months ago and trying to sell her house.   HIV CDC guidelines discussed with patient. Patient would like HIV screening today.        Diabetes Follow-up      Patient is checking blood sugars: no meter, not checking     Diabetic concerns: None and other - not checking blood sugars     Symptoms of hypoglycemia (low blood sugar): none     Paresthesias (numbness or burning in feet) or sores: No     Date of last diabetic eye exam: October    She is taking metformin as directed regularly, takes Bydureon once a week as directed. She is also taking 50 units of insulin in the morning and night, but reports that she has been missing to take insulin in the morning especially since she has been busy for the wedding.        No longer following [facility]. Reports that she is not interested in nutritionist referral due to her busy schedule.     Diabetes Management Resources    Hyperlipidemia Follow-Up      Rate your low fat/cholesterol diet?: tries to     Taking statin?  takes one tablet Sunday and Thursday. Tried to take three times a week but experienced leg cramps     Other lipid medications/supplements?:  none    Hypertension Follow-up      Outpatient blood pressures are not being checked.    Low Salt Diet: no added salt    Taking all blood pressure medications regularly     BP Readings from Last 2 Encounters:   11/14/18 116/78   04/02/18 140/88     Hemoglobin A1C (%)   Date Value   11/14/2018 9.7 (H)   04/02/2018 8.7 (H)     LDL Cholesterol Calculated (mg/dL)   Date Value   04/02/2018 130 (H)   05/01/2017     Cannot estimate LDL when triglyceride exceeds 400 mg/dL     LDL Cholesterol Direct (mg/dL)   Date Value   05/01/2017 92     Depression and Anxiety Follow-Up    Status since last visit: stable, very stressed out right now, maintaining    Other associated  symptoms:None    Complicating factors:     Significant life event: No     Current substance abuse: None    PHQ 2/27/2017 4/2/2018 11/14/2018   PHQ-9 Total Score 5 8 9   Q9: Suicide Ideation Not at all Not at all Not at all     NURIA-7 SCORE 2/27/2017   Total Score 0       PHQ-9  English  PHQ-9   Any Language  NURIA-7  Suicide Assessment Five-step Evaluation and Treatment (SAFE-T)  Asthma Follow-Up    Was ACT completed today?    Yes    ACT Total Scores 11/14/2018   ACT TOTAL SCORE -   ASTHMA ER VISITS -   ASTHMA HOSPITALIZATIONS -   ACT TOTAL SCORE (Goal Greater than or Equal to 20) 23   In the past 12 months, how many times did you visit the emergency room for your asthma without being admitted to the hospital? 0   In the past 12 months, how many times were you hospitalized overnight because of your asthma? 0       Recent asthma triggers that patient is dealing with: None      Hypothyroidism Follow-up      Since last visit, patient describes the following symptoms: Weight stable, no hair loss, no skin changes, no constipation, no loose stools      Amount of exercise or physical activity: very litle    Problems taking medications regularly: No    Medication side effects: none    Diet: low salt and low fat/cholesterol    Taking levothyroxine daily regularly. Reports that she has been taking the same dose of 88mcg for a long time.          Problem list and histories reviewed & adjusted, as indicated.  Additional history: as documented    Patient Active Problem List   Diagnosis     Attention deficit disorder     Sciatica     Hypothyroidism     iamLUMBOSACRAL NEURITIS NOS     Hyperlipidemia LDL goal <100     Hypertension goal BP (blood pressure) < 130/80     Moderate major depression (H)     Family history of ischemic heart disease     Intermittent asthma     ASCUS with positive high risk HPV cervical     Morbid obesity (H)     Headache     Uncontrolled type 2 diabetes mellitus with complication (H)     Shoulder pain, left      Past Surgical History:   Procedure Laterality Date     BIOPSY       ENT SURGERY       ORTHOPEDIC SURGERY       SURGICAL HISTORY OF -   1990    right knee arthroscopic     SURGICAL HISTORY OF -   2000    lump on neck removed       Social History   Substance Use Topics     Smoking status: Never Smoker     Smokeless tobacco: Never Used     Alcohol use Yes      Comment: 3 to 4 per year     Family History   Problem Relation Age of Onset     HEART DISEASE Father      heart attack at 64     Diabetes Father      C.A.D. Father      Coronary Artery Disease Father      Diabetes Paternal Grandmother      Cancer Paternal Grandfather      Lung CA     Diabetes Maternal Grandmother      Diabetes Mother      Gynecology Mother      hysterectomy- ovarian cyst     Respiratory Mother      COPD- dependent on oxygen, passed away at age 69     Allergies Mother      to percocet     Depression Mother      Mental Illness Mother      Obesity Mother      Diabetes Paternal Aunt      Diabetes Paternal Uncle      Alcohol/Drug Paternal Uncle      Diabetes Brother      Substance Abuse Other      Asthma Daughter      Cerebrovascular Disease No family hx of          Current Outpatient Prescriptions   Medication Sig Dispense Refill     ACCU-CHEK KAYKAY PLUS test strip TEST 2-3 TIMES DAILY 250 strip 3     ACETAMINOPHEN PO Take 325 mg by mouth every 6 hours as needed for pain       albuterol (ALBUTEROL) 108 (90 BASE) MCG/ACT inhaler Inhale 2 puffs into the lungs every 6 hours 1 Inhaler 0     BD FRACNOISE U/F 32G X 4 MM insulin pen needle USE TWICE DAILY 100 each 0     blood glucose monitoring (ACCU-CHEK MULTICLIX) lancets Testing twice daily. 1 Box 12     blood glucose monitoring (NO BRAND SPECIFIED) meter device kit Use to test blood sugar 2-3 times daily or as directed. 1 kit 0     Escitalopram Oxalate (LEXAPRO PO) Take 40 mg by mouth daily.       exenatide ER (BYDUREON) 2 MG pen Inject 2 mg Subcutaneous every 7 days 3 each 3     FLOVENT  MCG/ACT  Inhaler TAKE 2 PUFFS BY MOUTH TWICE A DAY 12 Inhaler 3     fluticasone (FLONASE) 50 MCG/ACT spray Spray 1-2 sprays into both nostrils daily 1 Bottle 11     HYDROcodone-acetaminophen (NORCO) 5-325 MG per tablet Take 1 tablet by mouth daily as needed for pain 40 tablet 0     insulin lispro prot & lispro (HUMALOG MIX 75/25 KWIKPEN) injection INJECT 55 UNITS UNDER THE SKIN TWICE DAILY. 45 mL 2     levothyroxine (SYNTHROID/LEVOTHROID) 88 MCG tablet Take 1 tablet (88 mcg) by mouth daily 90 tablet 1     lisinopril-hydrochlorothiazide (PRINZIDE/ZESTORETIC) 10-12.5 MG per tablet Take 2 tablets by mouth daily 180 tablet 1     LORazepam (ATIVAN) 0.5 MG tablet 0.5 mg as needed Reported on 5/1/2017  2     LUNESTA 3 MG OR TABS 1 TABLET AT BEDTIME PRN 0 0     metFORMIN (GLUCOPHAGE) 500 MG tablet TAKE 3 TABLETS BY MOUTH IN THE MORNING AND 2 TABLETS IN THE EVENING. 450 tablet 1     metoprolol tartrate (LOPRESSOR) 100 MG tablet TAKE 1 TABLET (100 MG) BY MOUTH 2 TIMES DAILY 180 tablet 1     MULTI-VITAMIN OR TABS 1 TAB PO QD 0 0     olopatadine (PATANOL) 0.1 % ophthalmic solution PLACE 1 DROP INTO BOTH EYES 2 TIMES DAILY 5 mL 5     order for DME Equipment being ordered: post op shoe 1 each 0     order for DME Equipment being ordered: elastic ankle brace 1 each 0     pravastatin (PRAVACHOL) 20 MG tablet TAKE 1 TABLET (20 MG) BY MOUTH DAILY 90 tablet 2     STATIN NOT PRESCRIBED, INTENTIONAL, 1 each daily Please choose reason not prescribed, below       UNKNOWN MED DOSAGE FOLIC ACID- 1 TABLET DAILY 0 0     VITAMIN D 1000 UNIT PO CAPS 1 CAPSULE DAILY       Allergies   Allergen Reactions     Hmg-Coa-R Inhibitors Other (See Comments) and Muscle Pain (Myalgia)     Muscle pains     Meperidine      Percocet [Oxycodone-Acetaminophen]      Family history (mother would get a rash)     Prednisone Other (See Comments)     Night sweats     Trazodone Itching     Recent Labs   Lab Test  11/14/18   1753  06/14/18   1004  04/02/18   0910  10/06/17   0962    05/01/17   1003  02/27/17   0809   01/31/13   1812   05/15/12   0809   08/09/11   0939   A1C  9.7*   --   8.7*  9.5*   < >  9.7*  9.8*   < >   --    < >  11.3*   < >   --    LDL   --    --   130*   --    --   Cannot estimate LDL when triglyceride exceeds 400 mg/dL  92  66   < >   --    --   84   --   63   HDL   --    --   46*   --    --   45*  42*   < >   --    --   49*   --   48*   TRIG   --    --   292*   --    --   429*  300*   < >   --    --   241*   --   193*   ALT   --    --    --    --    --    --    --    --   33   --   18   --   19   CR  0.94   --   0.98   --    < >   --   1.10*   < >  0.85   < >  0.80   < >   --    GFRESTIMATED  63   --   60*   --    < >   --   53*   < >  73   < >  78   < >   --    GFRESTBLACK  76   --   72   --    < >   --   64   < >  88   < >  >90   < >   --    POTASSIUM  4.2   --   4.3   --    < >   --   4.1   < >  4.5   < >  4.3   < >   --    TSH  4.32*  6.95*  5.38*   --    --    --   3.55   < >   --    --   3.81   --    --     < > = values in this interval not displayed.      BP Readings from Last 3 Encounters:   11/14/18 116/78   04/02/18 140/88   03/06/18 136/90    Wt Readings from Last 3 Encounters:   11/14/18 251 lb (113.9 kg)   04/02/18 255 lb (115.7 kg)   03/06/18 254 lb (115.2 kg)                  Labs reviewed in EPIC    Reviewed and updated as needed this visit by clinical staff  Tobacco  Allergies  Meds  Med Hx  Surg Hx  Fam Hx  Soc Hx      Reviewed and updated as needed this visit by Provider         ROS:  Constitutional, HEENT, cardiovascular, pulmonary, GI, , musculoskeletal, neuro, skin, endocrine and psych systems are negative, except as otherwise noted.    This document serves as a record of the services and decisions personally performed and made by Graciela Gates D.O. It was created on her behalf by Blas Dowd, a trained medical scribe. The creation of this document is based on the provider's statements to the medical scribe.  Blas Dowd November  "14, 2018 6:02 PM      OBJECTIVE:     /78  Pulse 84  Temp 97.8  F (36.6  C) (Oral)  Ht 5' 6.5\" (1.689 m)  Wt 251 lb (113.9 kg)  LMP 06/30/2016  BMI 39.91 kg/m2  Body mass index is 39.91 kg/(m^2).  GENERAL: alert, no distress and obese  EYES: Eyes grossly normal to inspection, PERRL and conjunctivae and sclerae normal  NECK: no adenopathy, no asymmetry, masses, or scars and thyroid normal to palpation  RESP: lungs clear to auscultation - no rales, rhonchi or wheezes  CV: regular rate and rhythm, normal S1 S2, no S3 or S4, no murmur, click or rub, no peripheral edema and peripheral pulses strong  ABDOMEN: soft, nontender, no hepatosplenomegaly, no masses and bowel sounds normal  NEURO: Normal strength and tone, mentation intact and speech normal  PSYCH: mentation appears normal, affect normal/bright    Diagnostic Test Results:  No results found for this or any previous visit (from the past 24 hour(s)).  Results for orders placed or performed in visit on 11/14/18   HIV Screening   Result Value Ref Range    HIV Antigen Antibody Combo Nonreactive NR^Nonreactive       BASIC METABOLIC PANEL   Result Value Ref Range    Sodium 137 133 - 144 mmol/L    Potassium 4.2 3.4 - 5.3 mmol/L    Chloride 101 94 - 109 mmol/L    Carbon Dioxide 26 20 - 32 mmol/L    Anion Gap 10 3 - 14 mmol/L    Glucose 206 (H) 70 - 99 mg/dL    Urea Nitrogen 27 7 - 30 mg/dL    Creatinine 0.94 0.52 - 1.04 mg/dL    GFR Estimate 63 >60 mL/min/1.7m2    GFR Estimate If Black 76 >60 mL/min/1.7m2    Calcium 9.3 8.5 - 10.1 mg/dL   HEMOGLOBIN A1C   Result Value Ref Range    Hemoglobin A1C 9.7 (H) 0 - 5.6 %   Hemoglobin   Result Value Ref Range    Hemoglobin 14.4 11.7 - 15.7 g/dL   TSH with free T4 reflex   Result Value Ref Range    TSH 4.32 (H) 0.40 - 4.00 mU/L   T4 free   Result Value Ref Range    T4 Free 0.94 0.76 - 1.46 ng/dL       ASSESSMENT/PLAN:           Tobacco Cessation:   reports that she has never smoked. She has never used smokeless " "tobacco.      BMI:   Estimated body mass index is 39.91 kg/(m^2) as calculated from the following:    Height as of this encounter: 5' 6.5\" (1.689 m).    Weight as of this encounter: 251 lb (113.9 kg).   Weight management plan: Discussed healthy diet and exercise guidelines and patient will follow up in 3 months in clinic to re-evaluate.      1. Type 2 diabetes mellitus with complication, with long-term current use of insulin (H)  She is taking metformin as directed regularly, takes Bydureon once a week as directed. She is also taking 50 units of insulin in the morning and night, but reports that she has been missing to take insulin in the morning especially since she has been busy for the wedding. Got  last month. Hemoglobin A1c today is 9.7, I advised patient to continue to take all medications as directed and increase insulin dose to 55 units twice daily.   - BASIC METABOLIC PANEL  - metFORMIN (GLUCOPHAGE) 500 MG tablet; TAKE 3 TABLETS BY MOUTH IN THE MORNING AND 2 TABLETS IN THE EVENING.  Dispense: 450 tablet; Refill: 1  - insulin lispro prot & lispro (HUMALOG MIX 75/25 KWIKPEN) injection; INJECT 55 UNITS UNDER THE SKIN TWICE DAILY.  Dispense: 45 mL; Refill: 2  - Hemoglobin    2. Other specified hypothyroidism  Depending on labs, I will send patent of recommendations to either increase dosage of levothyroxine or to continue taking levothyroxine 88mcg.   - levothyroxine (SYNTHROID/LEVOTHROID) 88 MCG tablet; Take 1 tablet (88 mcg) by mouth daily  Dispense: 90 tablet; Refill: 1  - TSH with free T4 reflex    3. Uncontrolled type 2 diabetes mellitus with complication (H)  As above. She declined referral for nutritionist due to busy schedule, she is trying to sell her home.    - HEMOGLOBIN A1C    4. Essential hypertension  Controlled with lisinopril-hydrochlorothiazide, she will continue to take as directed.   - lisinopril-hydrochlorothiazide (PRINZIDE/ZESTORETIC) 10-12.5 MG per tablet; Take 2 tablets by mouth " daily  Dispense: 180 tablet; Refill: 1    5. Screen for colon cancer  Patient will complete FIT test as planned. Discussed risks and benefits.   - Fecal colorectal cancer screen FIT - Future (S+30); Future    6. Screening for malignant neoplasm of cervix  Patient declined PAP at this time, will plan to complete at next visit.     7. Screening for HIV (human immunodeficiency virus)  HIV CDC guidelines discussed with patient. Patient would like HIV screening today.    - HIV Screening    8.chronic pain/headache, uses 40 for a year-she has been on this prior to establishing care with this provider  Uses infrequently. Refill today. She may continue to use as directed.   - HYDROcodone-acetaminophen (NORCO) 5-325 MG per tablet; Take 1 tablet by mouth daily as needed for pain  Dispense: 40 tablet; Refill: 0        Spent  45 min greater than 50% of counseling and coordination of care for the conditions documented above.      Patient instructions discussed with patient    The information in this document, created by the medical scribe for me, accurately reflects the services I personally performed and the decisions made by me. I have reviewed and approved this document for accuracy prior to leaving the patient care area.  November 14, 2018 6:57 PM      Graciela Gates DO  [facility]

## 2018-11-14 NOTE — MR AVS SNAPSHOT
After Visit Summary   11/14/2018    Coty Myles    MRN: [id number]           Patient Information     Date Of Birth          1970        Visit Information        Provider Department      11/14/2018 5:40 PM Graciela Gates DO [facility]        Today's Diagnoses     Type 2 diabetes mellitus with complication, with long-term current use of insulin (H)    -  1    Other specified hypothyroidism        Uncontrolled type 2 diabetes mellitus with complication (H)        Essential hypertension        Screen for colon cancer        Screening for malignant neoplasm of cervix        Screening for HIV (human immunodeficiency virus)        Acute pain of left shoulder          Care Instructions    Increase insulin to 55 units twice daily.     Complete FIT test as planned for colon cancer screening. Please return stool sample to clinic at your earliest convenience.      Labs today. I will notify you of results when I receive them.   Depending on labs, I will send you of recommendations to either increase dosage of levothyroxine or to continue taking levothyroxine 88mcg.     Follow up here in three months for labs.       Refills to medications today.   Continue all medications as directed.   [facility]   Discharged by : Charo De León MA    Paper scripts provided to patient : yes     If you have any questions regarding your visit please contact your care team:     Team Gold                Clinic Hours Telephone Number     Dr. Grace Ma, PRIETO Morris, CNP 7am-7pm  Monday - Thursday   7am-5pm  Fridays  [phone]   (Appointment scheduling available 24/7)     RN Line  [phone] option 2     Urgent Care - [place]annine Jorge and [place] - 11am-9pm Monday-Friday Saturday-Sunday- 9am-5pm     [place] -   5pm-9pm Monday-Friday Saturday-Sunday- 9am-5pm    [phone] - Jeannine  Ania    [phone] [facility]       For a Price Quote for your services, please call our Consumer Price Line at [phone].     What options do I have for visits at the clinic other than the traditional office visit?     To expand how we care for you, many of our providers are utilizing electronic visits (e-visits) and telephone visits, when medically appropriate, for interactions with their patients rather than a visit in the clinic. We also offer nurse visits for many medical concerns. Just like any other service, we will bill your insurance company for this type of visit based on time spent on the phone with your provider. Not all insurance companies cover these visits. Please check with your medical insurance if this type of visit is covered. You will be responsible for any charges that are not paid by your insurance.   E-visits via [employer]: generally incur a $35.00 fee.     Telephone visits:  Time spent on the phone: *charged based on time that is spent on the phone in increments of 10 minutes. Estimated cost:   5-10 mins $30.00   11-20 mins. $59.00   21-30 mins. $85.00       Use [employer] (secure email communication and access to your chart) to send your primary care provider a message or make an appointment. Ask someone on your Team how to sign up for [employer].     As always, Thank you for trusting us with your health care needs!      [facility]:    Scheduling Appointments  [facility]  Call: [phone]    [facility]  Call: [phone]    [facility]  Call: [phone]    For Gastroenterology referrals   [facility] Gastroenterology   Clinics and Surgery Center, 4th Floor   [address] [place] [address]   Appointments: [phone]    WHERE TO GO FOR CARE?  Clinic    Make an appointment if you:       Are sick (cold, cough, flu, sore throat, earache or in pain).       Have a small injury (sprain, small  cut, burn or broken bone).       Need a physical exam, Pap smear, vaccine or prescription refill.       Have questions about your health or medicines.    To reach us:      Call [phone] ([phone]). Open 24 hours every day. (For counseling services, call [phone].)    Log into frenting at Fobbler. (Visit [email or web address] to create an account.) Hospital emergency room    An emergency is a serious or life- threatening problem that must be treated right away.    Call [phone] or get to the hospital if you have:      Very bad or sudden:            - Chest pain or pressure         - Bleeding         - Head or belly pain         - Dizziness or trouble seeing, walking or                          Speaking      Problems breathing      Blood in your vomit or you are coughing up blood      A major injury (knocked out, loss of a finger or limb, rape, broken bone protruding from skin)    A mental health crisis. (Or call the Mental Health Crisis line at 1-479.468.2915 or Suicide Prevention Hotline at 1-819.382.7176.)    Open 24 hours every day. You don't need an appointment.     Urgent care    Visit urgent care for sickness or small injuries when the clinic is closed. You don't need an appointment. To check hours or find an urgent care near you, visit [email or web address]. Online care    Get [facility] from [facility] for more than 70 common problems, like colds, allergies and infections. Open 24 hours every day at:   [email or web address]   Need help deciding?    For advice about where to be seen, you may call your clinic and ask to speak with a nurse. We're here for you 24 hours every day.         If you are deaf or hard of hearing, please let us know. We provide many free services including sign language interpreters, oral interpreters, TTYs, telephone amplifiers, note takers and written materials.                   Follow-ups after your visit        Follow-up notes from your care team     Return in about 3 months  "(around 2/14/2019) for Lab Work.      Future tests that were ordered for you today     Open Future Orders        Priority Expected Expires Ordered    Fecal colorectal cancer screen FIT - Future (S+30) Routine 12/5/2018 12/14/2018 11/14/2018            Who to contact     If you have questions or need follow up information about today's clinic visit or your schedule please contact [facility] directly at [phone].  Normal or non-critical lab and imaging results will be communicated to you by Jusphart, letter or phone within 4 business days after the clinic has received the results. If you do not hear from us within 7 days, please contact the clinic through thePlatform or phone. If you have a critical or abnormal lab result, we will notify you by phone as soon as possible.  Submit refill requests through thePlatform or call your pharmacy and they will forward the refill request to us. Please allow 3 business days for your refill to be completed.          Additional Information About Your Visit        thePlatform Information     thePlatform gives you secure access to your electronic health record. If you see a primary care provider, you can also send messages to your care team and make appointments. If you have questions, please call your primary care clinic.  If you do not have a primary care provider, please call [phone] and they will assist you.        Care EveryWhere ID     This is your Care EveryWhere ID. This could be used by other organizations to access your [place] medical records  [id number]        Your Vitals Were     Pulse Temperature Height Last Period BMI (Body Mass Index)       84 97.8  F (36.6  C) (Oral) 5' 6.5\" (1.689 m) 06/30/2016 39.91 kg/m2        Blood Pressure from Last 3 Encounters:   11/14/18 116/78   04/02/18 140/88   03/06/18 136/90    Weight from Last 3 Encounters:   11/14/18 251 lb (113.9 kg)   04/02/18 255 lb (115.7 kg)   03/06/18 254 lb (115.2 kg)              We Performed the " Following     BASIC METABOLIC PANEL     HEMOGLOBIN A1C     Hemoglobin     HIV Screening     TSH with free T4 reflex          Today's Medication Changes          These changes are accurate as of 11/14/18  6:17 PM.  If you have any questions, ask your nurse or doctor.               These medicines have changed or have updated prescriptions.        Dose/Directions    insulin lispro prot & lispro injection   Commonly known as:  HumaLOG MIX 75/25 KWIKPEN   This may have changed:  See the new instructions.   Used for:  Type 2 diabetes mellitus with complication, with long-term current use of insulin (H)   Changed by:  Graciela Gates DO        INJECT 55 UNITS UNDER THE SKIN TWICE DAILY.   Quantity:  45 mL   Refills:  2       levothyroxine 88 MCG tablet   Commonly known as:  SYNTHROID/LEVOTHROID   This may have changed:  See the new instructions.   Used for:  Other specified hypothyroidism   Changed by:  Graciela Gates DO        Dose:  88 mcg   Take 1 tablet (88 mcg) by mouth daily   Quantity:  90 tablet   Refills:  1            Where to get your medicines      These medications were sent to [facility] [id number] [place], [place] - [address] [place] [address]  [address] [place] [address]     Phone:  [phone]     insulin lispro prot & lispro injection    levothyroxine 88 MCG tablet    lisinopril-hydrochlorothiazide 10-12.5 MG per tablet    metFORMIN 500 MG tablet         Some of these will need a paper prescription and others can be bought over the counter.  Ask your nurse if you have questions.     Bring a paper prescription for each of these medications     HYDROcodone-acetaminophen 5-325 MG per tablet               Information about OPIOIDS     PRESCRIPTION OPIOIDS: WHAT YOU NEED TO KNOW   We gave you an opioid (narcotic) pain medicine. It is important to manage your pain, but opioids are not always the best choice. You should first try all the other options your care team gave you. Take  this medicine for as short a time (and as few doses) as possible.    Some activities can increase your pain, such as bandage changes or therapy sessions. It may help to take your pain medicine 30 to 60 minutes before these activities. Reduce your stress by getting enough sleep, working on hobbies you enjoy and practicing relaxation or meditation. Talk to your care team about ways to manage your pain beyond prescription opioids.    These medicines have risks:    DO NOT drive when on new or higher doses of pain medicine. These medicines can affect your alertness and reaction times, and you could be arrested for driving under the influence (DUI). If you need to use opioids long-term, talk to your care team about driving.    DO NOT operate heavy machinery    DO NOT do any other dangerous activities while taking these medicines.    DO NOT drink any alcohol while taking these medicines.     If the opioid prescribed includes acetaminophen, DO NOT take with any other medicines that contain acetaminophen. Read all labels carefully. Look for the word  acetaminophen  or  Tylenol.  Ask your pharmacist if you have questions or are unsure.    You can get addicted to pain medicines, especially if you have a history of addiction (chemical, alcohol or substance dependence). Talk to your care team about ways to reduce this risk.    All opioids tend to cause constipation. Drink plenty of water and eat foods that have a lot of fiber, such as fruits, vegetables, prune juice, apple juice and high-fiber cereal. Take a laxative (Miralax, milk of magnesia, Colace, Senna) if you don t move your bowels at least every other day. Other side effects include upset stomach, sleepiness, dizziness, throwing up, tolerance (needing more of the medicine to have the same effect), physical dependence and slowed breathing.    Store your pills in a secure place, locked if possible. We will not replace any lost or stolen medicine. If you don t finish your  medicine, please throw away (dispose) as directed by your pharmacist. The [place] Pollution Control Agency has more information about safe disposal: https://www.pca.[email or web address]/living-[email or web address]/managing-unwanted-medications         Primary Care Provider Office Phone # Fax #    Graciela Gates [phone]       [address] [facility] [address]        Equal Access to Services     ORI JORGE : Hadii aad ku hadasho Soomaali, waaxda luqadaha, qaybta kaalmada adeegyada, waxay idiin hayaan adeeg kharash lacarole . So [facility] [phone].    ATENCIÓN: Si kim rosa, tiene a garcia disposición servicios gratuitos de asistencia lingüística. Llame al [phone].    We comply with applicable federal civil rights laws and [place] laws. We do not discriminate on the basis of race, color, national origin, age, disability, sex, sexual orientation, or gender identity.            Thank you!     Thank you for choosing [facility]  for your care. Our goal is always to provide you with excellent care. Hearing back from our patients is one way we can continue to improve our services. Please take a few minutes to complete the written survey that you may receive in the mail after your visit with us. Thank you!             Your Updated Medication List - Protect others around you: Learn how to safely use, store and throw away your medicines at www.disposemymeds.org.          This list is accurate as of 11/14/18  6:17 PM.  Always use your most recent med list.                   Brand Name Dispense Instructions for use Diagnosis    ACCU-CHEK KAYKAY PLUS test strip   Generic drug:  blood glucose monitoring     250 strip    TEST 2-3 TIMES DAILY    Type 2 diabetes mellitus with complication, with long-term current use of insulin (H)       ACETAMINOPHEN PO      Take 325 mg by mouth every 6 hours as needed for pain        albuterol 108 (90 Base) MCG/ACT inhaler    PROAIR HFA    1 Inhaler    Inhale  2 puffs into the lungs every 6 hours    Intermittent asthma, uncomplicated       BD FRANCOISE U/F 32G X 4 MM   Generic drug:  insulin pen needle     100 each    USE TWICE DAILY    Type 2 diabetes mellitus (H)       blood glucose monitoring lancets     1 Box    Testing twice daily.    Type 1 diabetes mellitus, uncontrolled (H)       blood glucose monitoring meter device kit    no brand specified    1 kit    Use to test blood sugar 2-3 times daily or as directed.    Uncontrolled type 2 diabetes mellitus with complication, with long-term current use of insulin (H)       exenatide ER 2 MG SQ pen for weelky inj    BYDUREON    3 each    Inject 2 mg Subcutaneous every 7 days    Type 2 diabetes mellitus with complication, with long-term current use of insulin (H)       FLOVENT  MCG/ACT Inhaler   Generic drug:  fluticasone     12 Inhaler    TAKE 2 PUFFS BY MOUTH TWICE A DAY    Mild intermittent asthma without complication       fluticasone 50 MCG/ACT spray    FLONASE    1 Bottle    Spray 1-2 sprays into both nostrils daily        HYDROcodone-acetaminophen 5-325 MG per tablet    NORCO    40 tablet    Take 1 tablet by mouth daily as needed for pain    Acute pain of left shoulder       insulin lispro prot & lispro injection    HumaLOG MIX 75/25 KWIKPEN    45 mL    INJECT 55 UNITS UNDER THE SKIN TWICE DAILY.    Type 2 diabetes mellitus with complication, with long-term current use of insulin (H)       levothyroxine 88 MCG tablet    SYNTHROID/LEVOTHROID    90 tablet    Take 1 tablet (88 mcg) by mouth daily    Other specified hypothyroidism       LEXAPRO PO      Take 40 mg by mouth daily.        lisinopril-hydrochlorothiazide 10-12.5 MG per tablet    PRINZIDE/ZESTORETIC    180 tablet    Take 2 tablets by mouth daily    Essential hypertension       LORazepam 0.5 MG tablet    ATIVAN     0.5 mg as needed Reported on 5/1/2017        LUNESTA 3 MG tablet   Generic drug:  eszopiclone     0    1 TABLET AT BEDTIME PRN        metFORMIN  500 MG tablet    GLUCOPHAGE    450 tablet    TAKE 3 TABLETS BY MOUTH IN THE MORNING AND 2 TABLETS IN THE EVENING.    Type 2 diabetes mellitus with complication, with long-term current use of insulin (H)       metoprolol tartrate 100 MG tablet    LOPRESSOR    180 tablet    TAKE 1 TABLET (100 MG) BY MOUTH 2 TIMES DAILY    Essential hypertension       Multi-vitamin Tabs tablet   Generic drug:  multivitamin, therapeutic with minerals     0    1 TAB PO QD        olopatadine 0.1 % ophthalmic solution    PATANOL    5 mL    PLACE 1 DROP INTO BOTH EYES 2 TIMES DAILY    Conjunctivitis, allergic, bilateral       * order for DME     1 each    Equipment being ordered: elastic ankle brace    Left foot pain       * order for DME     1 each    Equipment being ordered: post op shoe    Left foot pain       pravastatin 20 MG tablet    PRAVACHOL    90 tablet    TAKE 1 TABLET (20 MG) BY MOUTH DAILY    Type 2 diabetes mellitus with complication, with long-term current use of insulin (H), High blood cholesterol       STATIN NOT PRESCRIBED (INTENTIONAL)      1 each daily Please choose reason not prescribed, below    Uncontrolled type 2 diabetes mellitus with complication, with long-term current use of insulin (H)       UNKNOWN MED DOSAGE     0    FOLIC ACID- 1 TABLET DAILY    OTC -PATIENT CHOICE       vitamin D 1000 units capsule      1 CAPSULE DAILY        * Notice:  This list has 2 medication(s) that are the same as other medications prescribed for you. Read the directions carefully, and ask your doctor or other care provider to review them with you.

## 2018-11-15 LAB
ANION GAP SERPL CALCULATED.3IONS-SCNC: 10 MMOL/L (ref 3–14)
BUN SERPL-MCNC: 27 MG/DL (ref 7–30)
CALCIUM SERPL-MCNC: 9.3 MG/DL (ref 8.5–10.1)
CHLORIDE SERPL-SCNC: 101 MMOL/L (ref 94–109)
CO2 SERPL-SCNC: 26 MMOL/L (ref 20–32)
CREAT SERPL-MCNC: 0.94 MG/DL (ref 0.52–1.04)
GFR SERPL CREATININE-BSD FRML MDRD: 63 ML/MIN/1.7M2
GLUCOSE SERPL-MCNC: 206 MG/DL (ref 70–99)
HIV 1+2 AB+HIV1 P24 AG SERPL QL IA: NONREACTIVE
POTASSIUM SERPL-SCNC: 4.2 MMOL/L (ref 3.4–5.3)
SODIUM SERPL-SCNC: 137 MMOL/L (ref 133–144)
T4 FREE SERPL-MCNC: 0.94 NG/DL (ref 0.76–1.46)
TSH SERPL DL<=0.005 MIU/L-ACNC: 4.32 MU/L (ref 0.4–4)

## 2018-11-15 ASSESSMENT — ASTHMA QUESTIONNAIRES: ACT_TOTALSCORE: 23

## 2018-11-15 NOTE — PATIENT INSTRUCTIONS
Increase insulin to 55 units twice daily.     Complete FIT test as planned for colon cancer screening. Please return stool sample to clinic at your earliest convenience.      Labs today. I will notify you of results when I receive them.   Depending on labs, I will send you of recommendations to either increase dosage of levothyroxine or to continue taking levothyroxine 88mcg.     Follow up here in three months for labs.       Refills to medications today.   Continue all medications as directed.   [facility]   Discharged by : Charo De León MA    Paper scripts provided to patient : yes     If you have any questions regarding your visit please contact your care team:     Team Gold                Clinic Hours Telephone Number     Dr. Grace Ma, CNP  Antonia Morris, CNP 7am-7pm  Monday - Thursday   7am-5pm  Fridays  [phone]   (Appointment scheduling available 24/7)     RN Line  [phone] option 2     Urgent Care - [place] and [place] - 11am-9pm Monday-Friday Saturday-Sunday- 9am-5pm     [place] -   5pm-9pm Monday-Friday Saturday-Sunday- 9am-5pm    [phone] - Jeannine Jorge    [phone] - [place]       For a Price Quote for your services, please call our Consumer Price Line at [phone].     What options do I have for visits at the clinic other than the traditional office visit?     To expand how we care for you, many of our providers are utilizing electronic visits (e-visits) and telephone visits, when medically appropriate, for interactions with their patients rather than a visit in the clinic. We also offer nurse visits for many medical concerns. Just like any other service, we will bill your insurance company for this type of visit based on time spent on the phone with your provider. Not all insurance companies cover these visits. Please check with your medical insurance if this type of  visit is covered. You will be responsible for any charges that are not paid by your insurance.   E-visits via [employer]: generally incur a $35.00 fee.     Telephone visits:  Time spent on the phone: *charged based on time that is spent on the phone in increments of 10 minutes. Estimated cost:   5-10 mins $30.00   11-20 mins. $59.00   21-30 mins. $85.00       Use [employer] (secure email communication and access to your chart) to send your primary care provider a message or make an appointment. Ask someone on your Team how to sign up for [employer].     As always, Thank you for trusting us with your health care needs!      [facility]:    Scheduling Appointments  [place]  Call: [phone]    [place] [facility]  Call: [phone]    [facility]  Call: [phone]    For Gastroenterology referrals   [facility] Gastroenterology   Clinics and Surgery Center, 4th Floor   [address] [place] [address]   Appointments: [phone]    WHERE TO GO FOR CARE?  Clinic    Make an appointment if you:       Are sick (cold, cough, flu, sore throat, earache or in pain).       Have a small injury (sprain, small cut, burn or broken bone).       Need a physical exam, Pap smear, vaccine or prescription refill.       Have questions about your health or medicines.    To reach us:      Call [phone] ([phone]). Open 24 hours every day. (For counseling services, call [phone].)    Log into Spinnakr at [email or web address]. (Visit [email or web address] to create an account.) Hospital emergency room    An emergency is a serious or life- threatening problem that must be treated right away.    Call [phone] or get to the hospital if you have:      Very bad or sudden:            - Chest pain or pressure         - Bleeding         - Head or belly pain         - Dizziness or trouble seeing, walking or                           Speaking      Problems breathing      Blood in your vomit or you are coughing up blood      A major injury (knocked out, loss of a finger or limb, rape, broken bone protruding from skin)    A mental health crisis. (Or call the Mental Health Crisis line at 1-686.539.6477 or Suicide Prevention Hotline at 1-103.559.9645.)    Open 24 hours every day. You don't need an appointment.     Urgent care    Visit urgent care for sickness or small injuries when the clinic is closed. You don't need an appointment. To check hours or find an urgent care near you, visit [email or web address]. Online care    Get online care from [facility] for more than 70 common problems, like colds, allergies and infections. Open 24 hours every day at:   [email or web address]   Need help deciding?    For advice about where to be seen, you may call your clinic and ask to speak with a nurse. We're here for you 24 hours every day.         If you are deaf or hard of hearing, please let us know. We provide many free services including sign language interpreters, oral interpreters, TTYs, telephone amplifiers, note takers and written materials.

## 2018-12-10 NOTE — RESULT ENCOUNTER NOTE
Your thyroid is stable, your diabetes as discussed needs to be better controlled, follow up in 2 -3 months with provider  Take care  CHARLES GonzalezO

## 2018-12-15 DIAGNOSIS — E11.9 TYPE 2 DIABETES MELLITUS (H): ICD-10-CM

## 2018-12-17 DIAGNOSIS — Z79.4 TYPE 2 DIABETES MELLITUS WITH COMPLICATION, WITH LONG-TERM CURRENT USE OF INSULIN (H): ICD-10-CM

## 2018-12-17 DIAGNOSIS — E11.8 TYPE 2 DIABETES MELLITUS WITH COMPLICATION, WITH LONG-TERM CURRENT USE OF INSULIN (H): ICD-10-CM

## 2018-12-17 RX ORDER — PEN NEEDLE, DIABETIC 32GX 5/32"
NEEDLE, DISPOSABLE MISCELLANEOUS
Qty: 100 EACH | Refills: 0 | Status: SHIPPED | OUTPATIENT
Start: 2018-12-17 | End: 2019-02-13

## 2018-12-17 RX ORDER — INSULIN LISPRO 100 [IU]/ML
INJECTION, SUSPENSION SUBCUTANEOUS
Refills: 2 | OUTPATIENT
Start: 2018-12-17

## 2018-12-17 NOTE — TELEPHONE ENCOUNTER
Prescription approved per [facility] Refill Protocol.  Zee Turner RN

## 2018-12-17 NOTE — TELEPHONE ENCOUNTER
"Requested Prescriptions   Pending Prescriptions Disp Refills     BD FRANCOISE U/F 32G X 4 MM insulin pen needle [Pharmacy Med Name: BD UF FRANCOISE PEN NEEDLE [id number]]  Last Written Prescription Date:  10/19/2018  Last Fill Quantity: 100 EACH,  # refills: 0   Last office visit: 11/14/2018 with prescribing provider:  CANELO HERNANDEZ   Future Office Visit:      0     Sig: USE TWICE DAILY    Diabetic Supplies Protocol Passed - 12/15/2018  6:00 PM       Passed - Patient is 18 years of age or older       Passed - Recent (6 mo) or future (30 days) visit within the authorizing provider's specialty    Patient had office visit in the last 6 months or has a visit in the next 30 days with authorizing provider.  See \"Patient Info\" tab in inbasket, or \"Choose Columns\" in Meds & Orders section of the refill encounter.              "

## 2018-12-17 NOTE — TELEPHONE ENCOUNTER
"Requested Prescriptions   Pending Prescriptions Disp Refills     HUMALOG MIX 75/25 KWIKPEN (75-25) 100 UNIT/ML susp [Pharmacy Med Name: HUMALOG MIX 75-25 KWIKPEN]  Last Written Prescription Date:  11/14/2018  Last Fill Quantity: 45 mL,  # refills: 2   Last office visit: 11/14/2018 with prescribing provider:  CANELO Gates   Future Office Visit:      2     Sig: INJECT 50 UNITS UNDER THE SKIN TWICE DAILY.    Insulin Mixes Protocol Passed - 12/17/2018  2:02 AM       Passed - Blood pressure less than 140/90 in past 6 months    BP Readings from Last 3 Encounters:   11/14/18 116/78   04/02/18 140/88   03/06/18 136/90          Passed - LDL on file in past 12 months    Recent Labs   Lab Test 04/02/18  0910   *          Passed - Microalbumin on file in past 12 months    Recent Labs   Lab Test 04/02/18  0923   MICROL 42   UMALCR 16.63            Passed - Serum creatinine on file in past 12 months    Recent Labs   Lab Test 11/14/18  1753  10/19/12   CR 0.94   < >  --    CRPOC  --   --  0.8    < > = values in this interval not displayed.            Passed - HgbA1C in past 3 or 6 months    If HgbA1C is 8 or greater, it needs to be on file within the past 3 months.  If less than 8, must be on file within the past 6 months.     Recent Labs   Lab Test 11/14/18  1753   A1C 9.7*            Passed - Patient is age 18 or older       Passed - Recent (6 mo) or future (30 days) visit within the authorizing provider's specialty    Patient had office visit in the last 6 months or has a visit in the next 30 days with authorizing provider or within the authorizing provider's specialty.  See \"Patient Info\" tab in inbasket, or \"Choose Columns\" in Meds & Orders section of the refill encounter.              "

## 2019-01-19 DIAGNOSIS — I10 ESSENTIAL HYPERTENSION: ICD-10-CM

## 2019-01-21 RX ORDER — METOPROLOL TARTRATE 100 MG
TABLET ORAL
Qty: 180 TABLET | Refills: 1 | Status: SHIPPED | OUTPATIENT
Start: 2019-01-21 | End: 2019-04-15

## 2019-02-04 DIAGNOSIS — E11.8 TYPE 2 DIABETES MELLITUS WITH COMPLICATION, WITH LONG-TERM CURRENT USE OF INSULIN (H): ICD-10-CM

## 2019-02-04 DIAGNOSIS — Z79.4 TYPE 2 DIABETES MELLITUS WITH COMPLICATION, WITH LONG-TERM CURRENT USE OF INSULIN (H): ICD-10-CM

## 2019-02-04 RX ORDER — EXENATIDE 2 MG/.65ML
INJECTION, SUSPENSION, EXTENDED RELEASE SUBCUTANEOUS
Qty: 12 EACH | Refills: 3 | Status: SHIPPED | OUTPATIENT
Start: 2019-02-04 | End: 2019-04-15

## 2019-02-04 NOTE — TELEPHONE ENCOUNTER
Prescription approved per [facility] [place].  Alexia Hull,Clinic Rn  [place]

## 2019-02-04 NOTE — TELEPHONE ENCOUNTER
"Requested Prescriptions   Pending Prescriptions Disp Refills     BYDUREON 2 MG pen [Pharmacy Med Name: BYDUREON 2 MG PEN INJECT]  Last Written Prescription Date:  4/2/2018  Last Fill Quantity: 3 each,  # refills: 3   Last office visit: 11/14/2018 with prescribing provider:  CANELO Gates   Future Office Visit:   Next 5 appointments (look out 90 days)    Feb 15, 2019  9:00 AM CST  MyChart Long with Graciela Gates DO  [facility] ([facility]) [address] [place] [id number]  [phone]        3     Sig: INJECT 2 MG SUBCUTANEOUSLY EVERY 7 DAYS    GLP-1 Agonists Protocol Passed - 2/4/2019  1:16 AM       Passed - Blood pressure less than 140/90 in past 6 months    BP Readings from Last 3 Encounters:   11/14/18 116/78   04/02/18 140/88   03/06/18 136/90          Passed - LDL on file in past 12 months    Recent Labs   Lab Test 04/02/18  0910   *            Passed - Microalbumin on file in past 12 months    Recent Labs   Lab Test 04/02/18  0923   MICROL 42   UMALCR 16.63            Passed - HgbA1C in past 3 or 6 months    If HgbA1C is 8 or greater, it needs to be on file within the past 3 months.  If less than 8, must be on file within the past 6 months.     Recent Labs   Lab Test 11/14/18  1753   A1C 9.7*            Passed - Medication is active on med list       Passed - Patient is age 18 or older       Passed - No active pregnancy on record       Passed - Normal serum creatinine on file in past 12 months    Recent Labs   Lab Test 11/14/18  1753  10/19/12   CR 0.94   < >  --    CRPOC  --   --  0.8    < > = values in this interval not displayed.            Passed - No positive pregnancy test in past 12 months       Passed - Recent (6 mo) or future (30 days) visit within the authorizing provider's specialty    Patient had office visit in the last 6 months or has a visit in the next 30 days with authorizing provider.  See \"Patient Info\" tab in inbasket, or " "\"Choose Columns\" in Meds & Orders section of the refill encounter.              "

## 2019-02-13 ENCOUNTER — TELEPHONE (OUTPATIENT)
Dept: FAMILY MEDICINE | Facility: CLINIC | Age: 49
End: 2019-02-13

## 2019-02-13 DIAGNOSIS — E11.9 TYPE 2 DIABETES MELLITUS (H): ICD-10-CM

## 2019-02-13 NOTE — TELEPHONE ENCOUNTER
"Requested Prescriptions   Pending Prescriptions Disp Refills     insulin pen needle (BD FRANCOISE U/F) 32G X 4 MM miscellaneous  Last Written Prescription Date:  18  Last Fill Quantity: 100,  # refills: 0   Last office visit: 2018 with prescribing provider:  Kody   Future Office Visit:   Next 5 appointments (look out 90 days)    Feb 15, 2019  9:00 AM CST  MyChart Long with Graciela Gates DO  [facility] ([address] [address]  [phone]          100 each 0     Si times daily Use  pen needles daily or as directed.    Diabetic Supplies Protocol Passed - 2019  3:56 PM       Passed - Medication is active on med list       Passed - Patient is 18 years of age or older       Passed - Recent (6 mo) or future (30 days) visit within the authorizing provider's specialty    Patient had office visit in the last 6 months or has a visit in the next 30 days with authorizing provider.  See \"Patient Info\" tab in inbasket, or \"Choose Columns\" in Meds & Orders section of the refill encounter.              "

## 2019-02-15 ENCOUNTER — HEALTH MAINTENANCE LETTER (OUTPATIENT)
Age: 49
End: 2019-02-15

## 2019-02-15 NOTE — TELEPHONE ENCOUNTER
See plan at last visit 11/14/18:    Result QuickNote        Your thyroid is stable, your diabetes as discussed needs to be better controlled, follow up in 2 -3 months with provider  Take care  Graciela Gates D.O          Prescription approved per [facility] [place]ill [place].    Encounter routed to team to reach out to patient to schedule.    Megan Hendrickson RN  [facility]

## 2019-02-19 ENCOUNTER — TRANSFERRED RECORDS (OUTPATIENT)
Dept: HEALTH INFORMATION MANAGEMENT | Facility: CLINIC | Age: 49
End: 2019-02-19

## 2019-02-19 ENCOUNTER — OFFICE VISIT (OUTPATIENT)
Dept: FAMILY MEDICINE | Facility: CLINIC | Age: 49
End: 2019-02-19
Payer: COMMERCIAL

## 2019-02-19 VITALS
SYSTOLIC BLOOD PRESSURE: 120 MMHG | DIASTOLIC BLOOD PRESSURE: 76 MMHG | HEART RATE: 112 BPM | TEMPERATURE: 98.6 F | HEIGHT: 67 IN | BODY MASS INDEX: 39.36 KG/M2 | WEIGHT: 250.8 LBS

## 2019-02-19 DIAGNOSIS — R07.9 CHEST PAIN, UNSPECIFIED TYPE: ICD-10-CM

## 2019-02-19 DIAGNOSIS — R68.83 CHILLS: Primary | ICD-10-CM

## 2019-02-19 LAB
CREAT SERPL-MCNC: 1.08 MG/DL (ref 0.57–1.11)
GFR SERPL CREATININE-BSD FRML MDRD: 54 ML/MIN/1.73M2
GLUCOSE SERPL-MCNC: 261 MG/DL (ref 65–100)
POTASSIUM SERPL-SCNC: 4.4 MMOL/L (ref 3.5–5)

## 2019-02-19 PROCEDURE — 99215 OFFICE O/P EST HI 40 MIN: CPT | Performed by: PHYSICIAN ASSISTANT

## 2019-02-19 PROCEDURE — 93000 ELECTROCARDIOGRAM COMPLETE: CPT | Performed by: PHYSICIAN ASSISTANT

## 2019-02-19 ASSESSMENT — MIFFLIN-ST. JEOR: SCORE: 1782.31

## 2019-02-19 NOTE — PROGRESS NOTES
"  SUBJECTIVE:   Coty Myles is a 48 year old female who presents to clinic today for the following health issues:    Acute Illness   Acute illness concerns: Chills and feels cold  Onset: 12 hours ago     Fever: no    Chills/Sweats: YES    Headache (location?): YES    Sinus Pressure:no    Conjunctivitis:  No, watery eyes    Ear Pain: YES: right (pounding)     Rhinorrhea: YES    Congestion: YES    Sore Throat: YES     Cough: no    Wheeze: YES    Decreased Appetite: YES    Nausea: YES    Vomiting: no     Diarrhea:  YES    Dysuria/Freq.: no     Fatigue/Achiness: YES    Sick/Strep Exposure: no      Therapies Tried and outcome: Ibuprofen for pain    Patient says she had teeth pain for a few days now, it is more on the right side.    Chest pain - started last night - upper chest. Somewhat into the right breast / right side. \"Not bad but uncomfortable.\" Has had chest pain in the past with bronchitis but this feels less intense than that.. Reports no shortness of breath. She does have a racing heartbeat today. Father had an MI at 64. She does not smoke.     Diabetes - \"not checking sugars.\" But taking all meds.      Problem list and histories reviewed & adjusted, as indicated.  Additional history: as documented    Labs reviewed in EPIC    Reviewed and updated as needed this visit by clinical staff       Reviewed and updated as needed this visit by Provider         ROS:  Constitutional, HEENT, cardiovascular, pulmonary, gi and gu systems are negative, except as otherwise noted.    OBJECTIVE:     /76 (BP Location: Right arm, Patient Position: Chair, Cuff Size: Adult Regular)   Pulse 112   Temp 98.6  F (37  C) (Oral)   Ht 1.689 m (5' 6.5\")   Wt 113.8 kg (250 lb 12.8 oz)   LMP 06/30/2016   Breastfeeding? No   BMI 39.87 kg/m    Body mass index is 39.87 kg/m .  GENERAL: healthy, alert and no distress  EYES: Eyes grossly normal to inspection, PERRL and conjunctivae and sclerae normal  HENT: ear canals and TM's normal, " nose and mouth without ulcers or lesions  NECK: no adenopathy, no asymmetry, masses, or scars and thyroid normal to palpation  RESP: lungs clear to auscultation - no rales, rhonchi or wheezes  CV: regular rate and rhythm, normal S1 S2, no S3 or S4, no murmur, click or rub, no peripheral edema and peripheral pulses strong  MS: no gross musculoskeletal defects noted, no edema  SKIN: no suspicious lesions or rashes    Diagnostic Test Results:  EKG: 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 She may have a mild, early viral illness. Nothing else on her exam shows viral symptoms.     (R07.9) Chest pain, unspecified type  Comment:   Plan: EKG 12-lead complete w/read - Clinics        Mild EKG changes, tachycardic, mild to moderate chest pain with acute onset x 12 hours. Advised she go to ER. She declines EMS - she agrees to have her  drive her but she's very hesitant to follow this recommendation. Upon departure here today she was in agreement to go to the ER for an evaluation with family immediately. I reviewed the importance of this evaluation. Reviewed case with PCP who agreed with my medical evaluation and recommendations.     (E11.8,  E11.65) Uncontrolled type 2 diabetes mellitus with complication (H)  Comment:   Plan: She's a generally non compliant diabetic per my discussion with her PCP. She is not checking her sugars - she's at moderately high risk for CVD - as noted above, asked she go to ED.     OBI BURGESS PA-C  [facility]

## 2019-02-22 ENCOUNTER — OFFICE VISIT (OUTPATIENT)
Dept: FAMILY MEDICINE | Facility: CLINIC | Age: 49
End: 2019-02-22
Payer: COMMERCIAL

## 2019-02-22 ENCOUNTER — TELEPHONE (OUTPATIENT)
Dept: FAMILY MEDICINE | Facility: CLINIC | Age: 49
End: 2019-02-22

## 2019-02-22 VITALS
HEIGHT: 67 IN | DIASTOLIC BLOOD PRESSURE: 82 MMHG | OXYGEN SATURATION: 94 % | SYSTOLIC BLOOD PRESSURE: 136 MMHG | WEIGHT: 249 LBS | HEART RATE: 101 BPM | TEMPERATURE: 98.2 F | BODY MASS INDEX: 39.08 KG/M2

## 2019-02-22 DIAGNOSIS — J18.9 ATYPICAL PNEUMONIA: ICD-10-CM

## 2019-02-22 DIAGNOSIS — E03.8 OTHER SPECIFIED HYPOTHYROIDISM: Chronic | ICD-10-CM

## 2019-02-22 DIAGNOSIS — Z12.4 SCREENING FOR MALIGNANT NEOPLASM OF CERVIX: ICD-10-CM

## 2019-02-22 DIAGNOSIS — I10 HYPERTENSION GOAL BP (BLOOD PRESSURE) < 130/80: Primary | Chronic | ICD-10-CM

## 2019-02-22 DIAGNOSIS — D72.829 LEUKOCYTOSIS, UNSPECIFIED TYPE: ICD-10-CM

## 2019-02-22 DIAGNOSIS — Z12.11 SCREEN FOR COLON CANCER: ICD-10-CM

## 2019-02-22 LAB
BASOPHILS # BLD AUTO: 0.1 10E9/L (ref 0–0.2)
BASOPHILS NFR BLD AUTO: 0.7 %
DIFFERENTIAL METHOD BLD: NORMAL
EOSINOPHIL # BLD AUTO: 0.4 10E9/L (ref 0–0.7)
EOSINOPHIL NFR BLD AUTO: 4.1 %
ERYTHROCYTE [DISTWIDTH] IN BLOOD BY AUTOMATED COUNT: 14 % (ref 10–15)
HBA1C MFR BLD: 9.7 % (ref 0–5.6)
HCT VFR BLD AUTO: 43.8 % (ref 35–47)
HGB BLD-MCNC: 14.6 G/DL (ref 11.7–15.7)
LYMPHOCYTES # BLD AUTO: 3.2 10E9/L (ref 0.8–5.3)
LYMPHOCYTES NFR BLD AUTO: 31.3 %
MCH RBC QN AUTO: 30.5 PG (ref 26.5–33)
MCHC RBC AUTO-ENTMCNC: 33.3 G/DL (ref 31.5–36.5)
MCV RBC AUTO: 92 FL (ref 78–100)
MONOCYTES # BLD AUTO: 0.8 10E9/L (ref 0–1.3)
MONOCYTES NFR BLD AUTO: 8 %
NEUTROPHILS # BLD AUTO: 5.8 10E9/L (ref 1.6–8.3)
NEUTROPHILS NFR BLD AUTO: 55.9 %
PLATELET # BLD AUTO: 275 10E9/L (ref 150–450)
RBC # BLD AUTO: 4.78 10E12/L (ref 3.8–5.2)
T4 FREE SERPL-MCNC: 0.96 NG/DL (ref 0.76–1.46)
TSH SERPL DL<=0.005 MIU/L-ACNC: 9.07 MU/L (ref 0.4–4)
WBC # BLD AUTO: 10.3 10E9/L (ref 4–11)

## 2019-02-22 PROCEDURE — 84443 ASSAY THYROID STIM HORMONE: CPT | Performed by: FAMILY MEDICINE

## 2019-02-22 PROCEDURE — 85025 COMPLETE CBC W/AUTO DIFF WBC: CPT | Performed by: FAMILY MEDICINE

## 2019-02-22 PROCEDURE — 83036 HEMOGLOBIN GLYCOSYLATED A1C: CPT | Performed by: FAMILY MEDICINE

## 2019-02-22 PROCEDURE — 84439 ASSAY OF FREE THYROXINE: CPT | Performed by: FAMILY MEDICINE

## 2019-02-22 PROCEDURE — 99214 OFFICE O/P EST MOD 30 MIN: CPT | Performed by: FAMILY MEDICINE

## 2019-02-22 PROCEDURE — 80048 BASIC METABOLIC PNL TOTAL CA: CPT | Performed by: FAMILY MEDICINE

## 2019-02-22 PROCEDURE — 36415 COLL VENOUS BLD VENIPUNCTURE: CPT | Performed by: FAMILY MEDICINE

## 2019-02-22 ASSESSMENT — MIFFLIN-ST. JEOR: SCORE: 1774.15

## 2019-02-22 NOTE — PROGRESS NOTES
"  SUBJECTIVE:   Coty Myles is a 48 year old female who presents to clinic today for the following health issues:      Diabetes Follow-up      Patient is checking blood sugars: not at all, she reports that this is a secondary concern.     Diabetic concerns: None, not concerned about this today      Symptoms of hypoglycemia (low blood sugar): none     Paresthesias (numbness or burning in feet) or sores: No     Date of last diabetic eye exam: no    BP Readings from Last 2 Encounters:   02/22/19 136/82   02/19/19 120/76     Hemoglobin A1C (%)   Date Value   02/22/2019 9.7 (H)   11/14/2018 9.7 (H)     LDL Cholesterol Calculated (mg/dL)   Date Value   04/02/2018 130 (H)   05/01/2017     Cannot estimate LDL when triglyceride exceeds 400 mg/dL     LDL Cholesterol Direct (mg/dL)   Date Value   05/01/2017 92     Taking the meds consistently  Not checking her sugars  Diet improving a little  Living with her sister , she is moving to [facility] soon.  Her  had a heart attack, so she is eating healther    Diabetes Management Resources    No colon cancer history, no bleeding, no history of polyps  Sometimes cramps    High cholesterol-pravachol 2 times a week, cant tolerate more than that      ED/UC Followup:    Facility:  [facility]   Date of visit: 2/19/19  Reason for visit: Pneumonia   Current Status: \"doing ok\"     Chest wall pain improving, breathing ok  No real cough  Taking antibiotics        Problem list and histories reviewed & adjusted, as indicated.  Additional history: as documented    Patient Active Problem List   Diagnosis     Attention deficit disorder     Sciatica     Hypothyroidism     iamLUMBOSACRAL NEURITIS NOS     Hyperlipidemia LDL goal <100     Hypertension goal BP (blood pressure) < 130/80     Moderate major depression (H)     Family history of ischemic heart disease     Intermittent asthma     ASCUS with positive high risk HPV cervical     Morbid obesity (H)     Headache     Uncontrolled type 2 " "diabetes mellitus with complication (H)     Shoulder pain, left     Past Surgical History:   Procedure Laterality Date     BIOPSY       ENT SURGERY       ORTHOPEDIC SURGERY       SURGICAL HISTORY OF -   1990    right knee arthroscopic     SURGICAL HISTORY OF -   2000    lump on neck removed       Social History     Tobacco Use     Smoking status: Never Smoker     Smokeless tobacco: Never Used   Substance Use Topics     Alcohol use: Yes     Comment: 3 to 4 per year     Family History   Problem Relation Age of Onset     Heart Disease Father         heart attack at 64     Diabetes Father      C.A.D. Father      Coronary Artery Disease Father      Diabetes Paternal Grandmother      Cancer Paternal Grandfather         Lung CA     Diabetes Maternal Grandmother      Diabetes Mother      Gynecology Mother         hysterectomy- ovarian cyst     Respiratory Mother         COPD- dependent on oxygen, passed away at age 69     Allergies Mother         to percocet     Depression Mother      Mental Illness Mother      Obesity Mother      Diabetes Paternal Aunt      Diabetes Paternal Uncle      Alcohol/Drug Paternal Uncle      Diabetes Brother      Substance Abuse Other      Asthma Daughter      Cerebrovascular Disease No family hx of            Reviewed and updated as needed this visit by clinical staff  Allergies  Meds       Reviewed and updated as needed this visit by Provider         ROS:  Constitutional, HEENT, cardiovascular, pulmonary, GI, , musculoskeletal, neuro, skin, endocrine and psych systems are negative, except as otherwise noted.    OBJECTIVE:     /82   Pulse 101   Temp 98.2  F (36.8  C) (Oral)   Ht 1.689 m (5' 6.5\")   Wt 112.9 kg (249 lb)   LMP 06/30/2016   SpO2 94%   BMI 39.59 kg/m    Body mass index is 39.59 kg/m .  GENERAL: healthy, alert and no distress  EYES: Eyes grossly normal to inspection, PERRL and conjunctivae and sclerae normal  HENT: ear canals and TM's normal, nose and mouth without " ulcers or lesions  NECK: no adenopathy, no asymmetry, masses, or scars and thyroid normal to palpation  RESP: lungs clear to auscultation - no rales, rhonchi or wheezes  CV: regular rate and rhythm, normal S1 S2, no S3 or S4, no murmur, click or rub, no peripheral edema and peripheral pulses strong  ABDOMEN: soft, nontender, no hepatosplenomegaly, no masses and bowel sounds normal  MS: no gross musculoskeletal defects noted, no edema    Diagnostic Test Results:  Results for orders placed or performed in visit on 02/22/19 (from the past 24 hour(s))   HEMOGLOBIN A1C   Result Value Ref Range    Hemoglobin A1C 9.7 (H) 0 - 5.6 %   CBC with platelets and differential   Result Value Ref Range    WBC 10.3 4.0 - 11.0 10e9/L    RBC Count 4.78 3.8 - 5.2 10e12/L    Hemoglobin 14.6 11.7 - 15.7 g/dL    Hematocrit 43.8 35.0 - 47.0 %    MCV 92 78 - 100 fl    MCH 30.5 26.5 - 33.0 pg    MCHC 33.3 31.5 - 36.5 g/dL    RDW 14.0 10.0 - 15.0 %    Platelet Count 275 150 - 450 10e9/L    % Neutrophils 55.9 %    % Lymphocytes 31.3 %    % Monocytes 8.0 %    % Eosinophils 4.1 %    % Basophils 0.7 %    Absolute Neutrophil 5.8 1.6 - 8.3 10e9/L    Absolute Lymphocytes 3.2 0.8 - 5.3 10e9/L    Absolute Monocytes 0.8 0.0 - 1.3 10e9/L    Absolute Eosinophils 0.4 0.0 - 0.7 10e9/L    Absolute Basophils 0.1 0.0 - 0.2 10e9/L    Diff Method Automated Method        ASSESSMENT/PLAN:       ICD-10-CM    1. Hypertension goal BP (blood pressure) < 130/80 I10 CBC with platelets and differential   2. Screen for colon cancer Z12.11    3. Screening for malignant neoplasm of cervix Z12.4    4. Other specified hypothyroidism E03.8 TSH with free T4 reflex   5. Uncontrolled type 2 diabetes mellitus with complication (H) E11.8 HEMOGLOBIN A1C    E11.65 Basic metabolic panel     DIABETES EDUCATOR REFERRAL   6. Atypical pneumonia J18.9 CBC with platelets and differential   7. Leukocytosis, unspecified type D72.829 CBC with platelets and differential   atypical  pneumonia-hospital follow up-doing better  DIABETES MELLITUS/HTN-uncontrolled diabetes mellitus, does not check sugars, advised checking more sugars, she is to increase insulin if high sugars, she may need meals insulin or glipizide addition, follow up with nutrition  High cholesterol-elevated, she can not tolerate more statin, she is on it 2-3 times a week, lifestyle changes advised  cologaurd ordered    See Patient Instructions    Graciela Gates DO  [facility]

## 2019-02-22 NOTE — PATIENT INSTRUCTIONS
Cologaurd ordered, you should hear from them soon  Check your sugars more regularily  Follow up with Nutritionist for CGM  Consider going up on insulin to 60 units if elevated blood sugars  Graciela Gates D.O.

## 2019-02-24 LAB
ANION GAP SERPL CALCULATED.3IONS-SCNC: 15 MMOL/L (ref 3–14)
BUN SERPL-MCNC: 24 MG/DL (ref 7–30)
CALCIUM SERPL-MCNC: 10.2 MG/DL (ref 8.5–10.1)
CHLORIDE SERPL-SCNC: 107 MMOL/L (ref 94–109)
CO2 SERPL-SCNC: 20 MMOL/L (ref 20–32)
CREAT SERPL-MCNC: 0.8 MG/DL (ref 0.52–1.04)
GFR SERPL CREATININE-BSD FRML MDRD: 86 ML/MIN/{1.73_M2}
GLUCOSE SERPL-MCNC: 115 MG/DL (ref 70–99)
POTASSIUM SERPL-SCNC: 4.3 MMOL/L (ref 3.4–5.3)
SODIUM SERPL-SCNC: 142 MMOL/L (ref 133–144)

## 2019-02-25 NOTE — RESULT ENCOUNTER NOTE
Your thyroid labs are abnormal, please make sure you are taking med regularily and recheck in 8 weeks  Follow up with specialsts as planned  CHARLES GonzalezO

## 2019-03-05 ENCOUNTER — OFFICE VISIT (OUTPATIENT)
Dept: FAMILY MEDICINE | Facility: CLINIC | Age: 49
End: 2019-03-05
Payer: COMMERCIAL

## 2019-03-05 ENCOUNTER — TELEPHONE (OUTPATIENT)
Dept: FAMILY MEDICINE | Facility: CLINIC | Age: 49
End: 2019-03-05

## 2019-03-05 VITALS
SYSTOLIC BLOOD PRESSURE: 130 MMHG | DIASTOLIC BLOOD PRESSURE: 80 MMHG | HEIGHT: 67 IN | HEART RATE: 74 BPM | BODY MASS INDEX: 39.59 KG/M2

## 2019-03-05 DIAGNOSIS — N84.1 CERVICAL POLYP: ICD-10-CM

## 2019-03-05 DIAGNOSIS — R87.810 ASCUS WITH POSITIVE HIGH RISK HPV CERVICAL: ICD-10-CM

## 2019-03-05 DIAGNOSIS — Z11.3 SCREEN FOR STD (SEXUALLY TRANSMITTED DISEASE): ICD-10-CM

## 2019-03-05 DIAGNOSIS — R87.610 ASCUS WITH POSITIVE HIGH RISK HPV CERVICAL: ICD-10-CM

## 2019-03-05 PROCEDURE — 88175 CYTOPATH C/V AUTO FLUID REDO: CPT | Performed by: FAMILY MEDICINE

## 2019-03-05 PROCEDURE — 57455 BIOPSY OF CERVIX W/SCOPE: CPT | Performed by: FAMILY MEDICINE

## 2019-03-05 PROCEDURE — 87491 CHLMYD TRACH DNA AMP PROBE: CPT | Performed by: FAMILY MEDICINE

## 2019-03-05 PROCEDURE — 87624 HPV HI-RISK TYP POOLED RSLT: CPT | Performed by: FAMILY MEDICINE

## 2019-03-05 PROCEDURE — 87591 N.GONORRHOEAE DNA AMP PROB: CPT | Performed by: FAMILY MEDICINE

## 2019-03-05 PROCEDURE — 99213 OFFICE O/P EST LOW 20 MIN: CPT | Mod: 25 | Performed by: FAMILY MEDICINE

## 2019-03-05 PROCEDURE — 88305 TISSUE EXAM BY PATHOLOGIST: CPT | Performed by: FAMILY MEDICINE

## 2019-03-05 PROCEDURE — 88141 CYTOPATH C/V INTERPRET: CPT | Performed by: FAMILY MEDICINE

## 2019-03-05 RX ORDER — FLASH GLUCOSE SENSOR
KIT MISCELLANEOUS
Qty: 1 EACH | Refills: 1 | Status: SHIPPED | OUTPATIENT
Start: 2019-03-05 | End: 2019-03-06

## 2019-03-05 NOTE — PATIENT INSTRUCTIONS
Increase to 60 units in the am and 55 in PM   Continue all meds  montitor food  Dinner  Follow up with Nutrionst as planned to do the  continuous diabetes glucose  check    Graciela Gates D.O.

## 2019-03-05 NOTE — Clinical Note
This was office visit(i usually do level 4 for office visit but did 3) , colp and cervical polyp removal

## 2019-03-05 NOTE — TELEPHONE ENCOUNTER
Reason for Call:  Medication or medication refill:    Do you use a [facility]?  Name of the pharmacy and phone number for the current request:      [id number] - [place], [place] - [address]      Name of the medication requested: Aleida    Other request: Patient states she needs a rx sent to her pharmacy for the [employer]    Can we leave a detailed message on this number? YES    Phone number patient can be reached at: Home number on file [phone] (home)    Best Time: Any    Call taken on 3/5/2019 at 5:12 PM by Rachelle Alvarez

## 2019-03-05 NOTE — PROGRESS NOTES
Florinda Myles is a 49 year old female who presents for repeat colposcopy, referred by Dr. Gates. Pap smear 11 months ago showed: ASC-US with HR HPV other. The prior pap showed normal with Other HR HPV      Patient also wants to talk about her DIABETES MELLITUS   She has been high at night mostly 300's and day time 140's  She had not been checking her sugars previously    DIABETES MELLITUS/HTN-uncontrolled diabetes mellitus, does not check sugars, advised checking more sugars, she is to increase insulin if high sugars, she may need meals insulin or glipizide addition, follow up with nutrition  High cholesterol-elevated, she can not tolerate more statin, she is on it 2-3 times a week, lifestyle changes advised  cologaurd ordered   bydurean weekly  Metformin 3 in the am and 2 pm  Insulin 55  In the am and pm      See Patient Instructions  Past Medical History:   Diagnosis Date     Alexia and dyslexia      Allergic rhinitis due to other allergen      ASCUS on Pap smear 9/20/13    neg HPV. refused colp. repeat pap/HPV in 2014 with annual exam     ASCUS with positive high risk HPV 8/13/13,11/27/15    9/11/12 colp- WNL     Attention deficit disorder without mention of hyperactivity      Calculus of kidney      Cervical high risk HPV (human papillomavirus) test positive 02/27/2017     Depressive disorder      Depressive disorder, not elsewhere classified      Diabetes mellitus with complication (H) 3/22/2010     Essential hypertension, benign      Headache      Headache      High risk HPV infection 11/7/14    normal pap     Migraine headaches 5/11/2011     Mild intermittent asthma      Moderate major depression (H) 11/22/2010     Other and unspecified hyperlipidemia      Sciatica      Thyroid disease      Type II or unspecified type diabetes mellitus without mention of complication, not stated as uncontrolled      Family History   Problem Relation Age of Onset     Heart Disease Father         heart attack at 64      Diabetes Father      C.A.D. Father      Coronary Artery Disease Father      Diabetes Paternal Grandmother      Cancer Paternal Grandfather         Lung CA     Diabetes Maternal Grandmother      Diabetes Mother      Gynecology Mother         hysterectomy- ovarian cyst     Respiratory Mother         COPD- dependent on oxygen, passed away at age 69     Allergies Mother         to percocet     Depression Mother      Mental Illness Mother      Obesity Mother      Diabetes Paternal Aunt      Diabetes Paternal Uncle      Alcohol/Drug Paternal Uncle      Diabetes Brother      Substance Abuse Other      Asthma Daughter      Cerebrovascular Disease No family hx of        Previous history of abnormal paps?: Yes   History of cryotherapy (freezing)?: : No  History of veneral diseases: : No  Do you desire testing for any of these diseases? : No  History of genital warts:  No  Visible warts now?:  No  Previously treated? If so, how?:  No     Patient's last menstrual period was 06/30/2016.    Type of contraception: post menopausal status  Age at first sexual intercourse: 18  Number of sexual partners (lifetime): 10  History   Smoking Status     Never Smoker   Smokeless Tobacco     Never Used       History of sexual abuse:  No    Allergies as of 03/05/2019 - Reviewed 02/22/2019   Allergen Reaction Noted     Hmg-coa-r inhibitors Other (See Comments) and Muscle Pain (Myalgia) 12/07/2007     Meperidine  05/12/2004     Percocet [oxycodone-acetaminophen]  04/12/2012     Prednisone Other (See Comments) 04/24/2015     Trazodone Itching 05/12/2004       PROCEDURE:  Before the procedure, it was ensured that the patient was educated regarding the nature of her findings to date, the implications of them, and what was to be done. She has been made aware of the role of HPV, the natural history of infection, ways to minimize her future risk, the effect of HPV on the cervix, and treatment options available should they be indicated. The    pathophysiology of the cervix, including a discussion of squamous vs. endometrial cells, and squamous metaplasia have all been reviewed, using illustrations and sketches. The details of the colposcopic procedure were reviewed, as well as the risks of missed diagnoses, pain, infection and bleeding. All questions were answered before proceeding, and informed consent was therefore obtained.    Bimanual examination: was performed and was unremarkable.  Unenhanced examination of the cervix was abnormal: with large 3cm polyp protruding from the OS Pap smear and endocervical sampling obtained     Polypectomy-after getting a consent from patient I was able to remove the cervical polyp without complications with Dr Lozoya in the room,   With ring forcep, the base of the polyp was grasped and twisted multiple times until polyp was detached. Sent for pathology    Please refer to images section for details!  Pap repeated?:  Yes   SCJ seen?:  yes  Endocervical speculum needed?:  No  ECC done?:  Yes   Lugol's solution used?:  No  Satisfactory examination?:  yes    Vaginal vault: normal to cursory inspection   Urethra normal?:  yes  Labia normal?:  yes  Perineum normal?:  yes  Rectum normal?:  yes    FINDINGS:  Please see image   Cervix: no visible lesions  Procedure: biopsies taken (not including ECC): 0.    Procedure summary: Patient tolerated procedure well     Assessment: Normal exam without visible pathology  Christian index:        ICD-10-CM    1. Uncontrolled type 2 diabetes mellitus with complication (H) E11.8 continuous blood glucose monitoring (FREESTYLE ANNA) sensor    E11.65    2. ASCUS with positive high risk HPV cervical R87.610 Pap imaged thin layer diagnostic with HPV (select HPV order below)    R87.810 HPV High Risk Types DNA Cervical     Surgical pathology exam     Surgical pathology exam     Colposcopy cervix with cervix biopsy and ECC   3. Screen for STD (sexually transmitted disease) Z11.3 Chlamydia  trachomatis PCR     Neisseria gonorrhoeae PCR   4. Cervical polyp N84.1 Surgical pathology exam     BIOPSY/EXCIS CERVICAL LESION W/WO FULGURATION     Patient with history of uncontrolled DIABETES MELLITUS-reviewed her home readings, no real sx, advised adjusting her insulin  Increase to 60 units in the am and 55 in PM   Continue all meds  montitor food  Dinner  Follow up with Nutrionst as planned to do the  continuous diabetes glucose  Check    Cervical polyp-removed today  History of HPV, ascus-colposcopy done with pap today      Plan: Specimens labelled and sent to pathology., Will base further treatment on pathology findings. and treatment options discussed with patient

## 2019-03-05 NOTE — TELEPHONE ENCOUNTER
Chart reviewed, and PCP sent these to [facility] today.  Patient notified, states she received the machine from [facility], but not the sensors.  It appears PCP sent order for sensors, but not the machine.  I reached out to [facility] and they will fill the sensors and reach out to patient to let her know they're ready.    Abbey Gallegos RN

## 2019-03-05 NOTE — LETTER
"[facility]  [address] [place] [id number]  [phone]                                                                                                March 11, 2019    Coty Myles  [address] [facility] [address]        Dear Ms. Myles,    Your recent lab results were within normal limits. A copy of those results are included with this letter.        Sincerely,      Malka Hernandez DO/hl  Results for orders placed or performed in visit on 03/05/19   Surgical pathology exam   Result Value Ref Range    Copath Report       Patient Name: COTY MYLES  MR#: [id number]  Specimen #: [id number]  Collected: 3/5/2019  Received: 3/6/2019  Reported: 3/7/2019 23:49  Ordering Phy(s): MALKA HERNANDEZ    For improved result formatting, select 'View Enhanced Report Format' under   Linked Documents section.    SPECIMEN(S):  A: Cervical polyp  B: Endocervical curettings    FINAL DIAGNOSIS:  A. Cervical polyp:  - Benign endometrial polyp with inactive glands (favored related to lower   uterine segment)    B. Endocervical curettings:  - No diagnostic abnormalities identified, mucus with fragments of benign   endocervical glandular epithelium and  benign squamous epithelium without atypia.    COMMENT:  TO VIEW DIGITAL IMAGES IN THIS REPORT:  *** Digital images of the pathologic findings are included with this   report (6 images total)***    To view the digital images associated with this report in Epic, select   \"View Enhanced Report Format\" under the  Linked Documents section of this report to view the PDF version of  the   original CoPath generated report.    Electronically signed out by:    OPHELIA Palmer M.D.    CLINICAL HISTORY:  48-year-old female with cervical polyp.    GROSS:  Two specimen containers with formalin are received labeled with the   patient's name, date of birth, and medical  record number.  Information on the requisition slip, containers, and   associated labels " "is confirmed.    A: The specimen is designated \"cervical polyp\" consisting of a 3.5 x 2 x   1.1 cm purple pink polyp with  attached stalk.  The specimen is serially sectioned and entirely submitted   in two cassettes.    B:  The specimen is designated \"endocervical curettings\" consisting of a   Cytobrush in formalin from which a  0.3 cm aggregate of mucoid translucent to red non-cohesive material is   obtained. The entire specimen is  filtered over a tissue wrap. The Cytobrush is scraped and is submitted   with the filtered tissue.  Entirely  submitted in one cassette. (Dictated by: Soumya Perez 3/6/2019 01:51 PM)    M ICROSCOPIC:  Microscopic examination was performed. (Dictated by: SHERLYN Palmer MD   03/07/2019)    The technical component of this testing was completed at the [facility], with the professional component performed   at the [facility], [address] [place] [id number] ([phone])    CPT Codes:  A: 64809-CQ5  B: 39357-RR8    COLLECTION SITE:  Client: [facility]  Location: [facility] (B)       Chlamydia trachomatis PCR   Result Value Ref Range    Specimen Description Cervix     Chlamydia Trachomatis PCR Negative NEG^Negative   Neisseria gonorrhoeae PCR   Result Value Ref Range    Specimen Descrip Cervix     N Gonorrhea PCR Negative NEG^Negative             "

## 2019-03-06 ENCOUNTER — MYC MEDICAL ADVICE (OUTPATIENT)
Dept: FAMILY MEDICINE | Facility: CLINIC | Age: 49
End: 2019-03-06

## 2019-03-06 LAB
C TRACH DNA SPEC QL NAA+PROBE: NEGATIVE
N GONORRHOEA DNA SPEC QL NAA+PROBE: NEGATIVE
SPECIMEN SOURCE: NORMAL
SPECIMEN SOURCE: NORMAL

## 2019-03-06 RX ORDER — FLASH GLUCOSE SENSOR
KIT MISCELLANEOUS
Qty: 9 EACH | Refills: 0 | Status: SHIPPED | OUTPATIENT
Start: 2019-03-06 | End: 2019-05-14

## 2019-03-06 NOTE — TELEPHONE ENCOUNTER
Huddled with PCP. Sensors sent to pharmacy, MyChart sent and will close encounter.    Abbey Gallegos RN

## 2019-03-07 LAB — COPATH REPORT: NORMAL

## 2019-03-11 LAB
COPATH REPORT: ABNORMAL
PAP: ABNORMAL

## 2019-03-11 NOTE — RESULT ENCOUNTER NOTE
Your polyp is consistent with benign findings, your colposcopy pathology as well.  Follow up in one year as planned  Take care  Graciela Gates D.O

## 2019-03-13 ENCOUNTER — TELEPHONE (OUTPATIENT)
Dept: FAMILY MEDICINE | Facility: CLINIC | Age: 49
End: 2019-03-13

## 2019-03-13 ENCOUNTER — TRANSFERRED RECORDS (OUTPATIENT)
Dept: HEALTH INFORMATION MANAGEMENT | Facility: CLINIC | Age: 49
End: 2019-03-13

## 2019-03-13 DIAGNOSIS — E78.00 HIGH BLOOD CHOLESTEROL: ICD-10-CM

## 2019-03-13 DIAGNOSIS — Z79.4 TYPE 2 DIABETES MELLITUS WITH COMPLICATION, WITH LONG-TERM CURRENT USE OF INSULIN (H): ICD-10-CM

## 2019-03-13 DIAGNOSIS — E11.8 TYPE 2 DIABETES MELLITUS WITH COMPLICATION, WITH LONG-TERM CURRENT USE OF INSULIN (H): ICD-10-CM

## 2019-03-13 LAB — COLOGUARD-ABSTRACT: NEGATIVE

## 2019-03-13 NOTE — TELEPHONE ENCOUNTER
"Requested Prescriptions   Pending Prescriptions Disp Refills     pravastatin (PRAVACHOL) 20 MG tablet [Pharmacy Med Name: PRAVASTATIN SODIUM 20 MG TAB]  Last Written Prescription Date:  6/1/2018  Last Fill Quantity: 90 tabs,  # refills: 2   Last office visit: 3/5/2019 with prescribing provider:  Kody   Future Office Visit:     90 tablet 2     Sig: TAKE 1 TABLET BY MOUTH EVERY DAY    Statins Protocol Passed - 3/13/2019  1:28 AM       Passed - LDL on file in past 12 months    Recent Labs   Lab Test 04/02/18  0910   *            Passed - No abnormal creatine kinase in past 12 months    Recent Labs   Lab Test 05/01/17  1004                  Passed - Recent (12 mo) or future (30 days) visit within the authorizing provider's specialty    Patient had office visit in the last 12 months or has a visit in the next 30 days with authorizing provider or within the authorizing provider's specialty.  See \"Patient Info\" tab in inbasket, or \"Choose Columns\" in Meds & Orders section of the refill encounter.             Passed - Medication is active on med list       Passed - Patient is age 18 or older       Passed - No active pregnancy on record       Passed - No positive pregnancy test in past 12 months          "

## 2019-03-14 ENCOUNTER — TELEPHONE (OUTPATIENT)
Dept: FAMILY MEDICINE | Facility: CLINIC | Age: 49
End: 2019-03-14

## 2019-03-14 RX ORDER — PRAVASTATIN SODIUM 20 MG
TABLET ORAL
Qty: 90 TABLET | Refills: 0 | Status: SHIPPED | OUTPATIENT
Start: 2019-03-14 | End: 2019-04-15

## 2019-03-14 NOTE — TELEPHONE ENCOUNTER
I refilled the statin while PCP is out of the office.  Please assist patient with scheduling a Physical/recheck.    Daxa Ma, AYSHA, APRN, CNP

## 2019-03-14 NOTE — TELEPHONE ENCOUNTER
Route to PCP. Per lab from 3/2018:    Notes Recorded by Graciela Gates DO on 4/10/2018 at 10:04 AM  Your cholesterol is abnormal, please use the recommendations below and recheck labs in 3-6 months    Doesn't appear future order was placed and patient has not had this rechecked. She was seen 3/5/19, not checked at that time. Patient is due for follow up in a couple of weeks. Would you like to give amira?    Barry Pedroza RN

## 2019-03-14 NOTE — TELEPHONE ENCOUNTER
Panel Management Review      Patient has the following on her problem list:     Asthma review     ACT Total Scores 11/14/2018   ACT TOTAL SCORE -   ASTHMA ER VISITS -   ASTHMA HOSPITALIZATIONS -   ACT TOTAL SCORE (Goal Greater than or Equal to 20) 23   In the past 12 months, how many times did you visit the emergency room for your asthma without being admitted to the hospital? 0   In the past 12 months, how many times were you hospitalized overnight because of your asthma? 0      1. Is Asthma diagnosis on the Problem List? Yes    2. Is Asthma listed on Health Maintenance? Yes    3. Patient is due for:  AAP    Diabetes    ASA: PassedPassed    Last A1C  Lab Results   Component Value Date    A1C 9.7 02/22/2019    A1C 9.7 11/14/2018    A1C 8.7 04/02/2018    A1C 9.5 10/06/2017    A1C 8.8 07/06/2017     A1C tested: FAILED    Last LDL:    Lab Results   Component Value Date    CHOL 234 04/02/2018     Lab Results   Component Value Date    HDL 46 04/02/2018     Lab Results   Component Value Date     04/02/2018     Lab Results   Component Value Date    TRIG 292 04/02/2018     Lab Results   Component Value Date    CHOLHDLRATIO 2.1 08/28/2015     Lab Results   Component Value Date    NHDL 188 04/02/2018       Is the patient on a Statin? YES            Is the patient on Aspirin? NO    Medications     HMG CoA Reductase Inhibitors     pravastatin (PRAVACHOL) 20 MG tablet        STATIN NOT PRESCRIBED, INTENTIONAL,             Last three blood pressure readings:  BP Readings from Last 3 Encounters:   03/05/19 130/80   02/22/19 136/82   02/19/19 120/76       Date of last diabetes office visit: 3/5/2019     Tobacco History:     History   Smoking Status     Never Smoker   Smokeless Tobacco     Never Used           Composite cancer screening  Chart review shows that this patient is due/due soon for the following Colonoscopy  Summary:    Patient is due/failing the following:   COLONOSCOPY    Action needed:   Patient needs office  visit for physical .    Type of outreach:    Phone, spoke to patient.  Patient stated that she completed the Cologuard,    Questions for provider review:    None       Patient stated that she will call back and schedule a physical exam.                                                                                                                            Faviola Gutierrez CMA ([facility])

## 2019-03-25 DIAGNOSIS — E11.9 TYPE 2 DIABETES MELLITUS (H): ICD-10-CM

## 2019-03-26 NOTE — TELEPHONE ENCOUNTER
"Requested Prescriptions   Pending Prescriptions Disp Refills     insulin pen needle (BD FRANCOISE U/F) 32G X 4 MM miscellaneous [Pharmacy Med Name: BD UF FRANCOISE PEN NEEDLE [id number]]  Last Written Prescription Date:  2/15/2019  Last Fill Quantity: 100 each,  # refills: 0   Last office visit: 3/5/2019 with prescribing provider:  Kody   Future Office Visit:      0     Sig: USE TWICE DAILY    Diabetic Supplies Protocol Passed - 3/25/2019  5:01 PM       Passed - Medication is active on med list       Passed - Patient is 18 years of age or older       Passed - Recent (6 mo) or future (30 days) visit within the authorizing provider's specialty    Patient had office visit in the last 6 months or has a visit in the next 30 days with authorizing provider.  See \"Patient Info\" tab in inbasket, or \"Choose Columns\" in Meds & Orders section of the refill encounter.              "

## 2019-03-28 NOTE — TELEPHONE ENCOUNTER
Patient currently does not have any health insurance.  As soon as she gets insurance again she will call and schedule physical.    Melissa Dhillon

## 2019-04-01 ENCOUNTER — TRANSFERRED RECORDS (OUTPATIENT)
Dept: HEALTH INFORMATION MANAGEMENT | Facility: CLINIC | Age: 49
End: 2019-04-01

## 2019-04-12 DIAGNOSIS — E11.8 TYPE 2 DIABETES MELLITUS WITH COMPLICATION, WITH LONG-TERM CURRENT USE OF INSULIN (H): ICD-10-CM

## 2019-04-12 DIAGNOSIS — Z79.4 TYPE 2 DIABETES MELLITUS WITH COMPLICATION, WITH LONG-TERM CURRENT USE OF INSULIN (H): ICD-10-CM

## 2019-04-12 RX ORDER — INSULIN LISPRO 100 [IU]/ML
INJECTION, SUSPENSION SUBCUTANEOUS
Qty: 45 ML | Refills: 0 | Status: SHIPPED | OUTPATIENT
Start: 2019-04-12 | End: 2019-06-25

## 2019-04-12 NOTE — TELEPHONE ENCOUNTER
Pt requesting a refill on her Humalog, states she will not have enough to make it until her appointment on Monday.     Will run refill protocol.     Jeanine Mcrae RN/FNA

## 2019-04-12 NOTE — TELEPHONE ENCOUNTER
Refill protocol ran, pt has not had an LDL level since 4/2/18.  She has an appointment with pcp on Monday 4/15/19.  Last OV with pcp was on 3/5/19.  Writer will approve one refill today.      Jeanine Mcrae RN/FNA

## 2019-04-15 ENCOUNTER — OFFICE VISIT (OUTPATIENT)
Dept: FAMILY MEDICINE | Facility: CLINIC | Age: 49
End: 2019-04-15

## 2019-04-15 VITALS
WEIGHT: 248 LBS | TEMPERATURE: 97.8 F | BODY MASS INDEX: 38.92 KG/M2 | DIASTOLIC BLOOD PRESSURE: 85 MMHG | SYSTOLIC BLOOD PRESSURE: 124 MMHG | HEIGHT: 67 IN | HEART RATE: 93 BPM

## 2019-04-15 DIAGNOSIS — E03.8 OTHER SPECIFIED HYPOTHYROIDISM: Chronic | ICD-10-CM

## 2019-04-15 DIAGNOSIS — F32.1 MODERATE MAJOR DEPRESSION (H): ICD-10-CM

## 2019-04-15 DIAGNOSIS — E78.00 HIGH BLOOD CHOLESTEROL: ICD-10-CM

## 2019-04-15 DIAGNOSIS — I10 ESSENTIAL HYPERTENSION: ICD-10-CM

## 2019-04-15 DIAGNOSIS — E66.01 MORBID OBESITY (H): ICD-10-CM

## 2019-04-15 DIAGNOSIS — Z13.89 SCREENING FOR DIABETIC PERIPHERAL NEUROPATHY: ICD-10-CM

## 2019-04-15 DIAGNOSIS — E11.8 TYPE 2 DIABETES MELLITUS WITH COMPLICATION, WITH LONG-TERM CURRENT USE OF INSULIN (H): ICD-10-CM

## 2019-04-15 DIAGNOSIS — Z12.11 SCREEN FOR COLON CANCER: ICD-10-CM

## 2019-04-15 DIAGNOSIS — Z79.4 TYPE 2 DIABETES MELLITUS WITH COMPLICATION, WITH LONG-TERM CURRENT USE OF INSULIN (H): ICD-10-CM

## 2019-04-15 LAB — HBA1C MFR BLD: 8.6 % (ref 0–5.6)

## 2019-04-15 PROCEDURE — 80061 LIPID PANEL: CPT | Performed by: FAMILY MEDICINE

## 2019-04-15 PROCEDURE — 83036 HEMOGLOBIN GLYCOSYLATED A1C: CPT | Performed by: FAMILY MEDICINE

## 2019-04-15 PROCEDURE — 36415 COLL VENOUS BLD VENIPUNCTURE: CPT | Performed by: FAMILY MEDICINE

## 2019-04-15 PROCEDURE — 99214 OFFICE O/P EST MOD 30 MIN: CPT | Performed by: FAMILY MEDICINE

## 2019-04-15 RX ORDER — METOPROLOL TARTRATE 100 MG
TABLET ORAL
Qty: 180 TABLET | Refills: 1 | Status: SHIPPED | OUTPATIENT
Start: 2019-04-15 | End: 2020-07-16

## 2019-04-15 RX ORDER — INSULIN LISPRO 100 [IU]/ML
INJECTION, SUSPENSION SUBCUTANEOUS
Qty: 45 ML | Refills: 0 | Status: CANCELLED | OUTPATIENT
Start: 2019-04-15

## 2019-04-15 RX ORDER — METOPROLOL TARTRATE 100 MG
TABLET ORAL
Qty: 180 TABLET | Refills: 1 | Status: SHIPPED | OUTPATIENT
Start: 2019-04-15 | End: 2019-04-15

## 2019-04-15 RX ORDER — LISINOPRIL/HYDROCHLOROTHIAZIDE 10-12.5 MG
2 TABLET ORAL DAILY
Qty: 180 TABLET | Refills: 3 | Status: SHIPPED | OUTPATIENT
Start: 2019-04-15 | End: 2019-04-15

## 2019-04-15 RX ORDER — LEVOTHYROXINE SODIUM 88 UG/1
88 TABLET ORAL DAILY
Qty: 90 TABLET | Refills: 3 | Status: SHIPPED | OUTPATIENT
Start: 2019-04-15

## 2019-04-15 RX ORDER — PRAVASTATIN SODIUM 20 MG
20 TABLET ORAL DAILY
Qty: 90 TABLET | Refills: 1 | Status: SHIPPED | OUTPATIENT
Start: 2019-04-15

## 2019-04-15 RX ORDER — LISINOPRIL/HYDROCHLOROTHIAZIDE 10-12.5 MG
2 TABLET ORAL DAILY
Qty: 180 TABLET | Refills: 3 | Status: SHIPPED | OUTPATIENT
Start: 2019-04-15

## 2019-04-15 RX ORDER — LEVOTHYROXINE SODIUM 88 UG/1
88 TABLET ORAL DAILY
Qty: 90 TABLET | Refills: 3 | Status: SHIPPED | OUTPATIENT
Start: 2019-04-15 | End: 2019-04-15

## 2019-04-15 ASSESSMENT — PATIENT HEALTH QUESTIONNAIRE - PHQ9
5. POOR APPETITE OR OVEREATING: SEVERAL DAYS
SUM OF ALL RESPONSES TO PHQ QUESTIONS 1-9: 7

## 2019-04-15 ASSESSMENT — ANXIETY QUESTIONNAIRES
3. WORRYING TOO MUCH ABOUT DIFFERENT THINGS: NOT AT ALL
7. FEELING AFRAID AS IF SOMETHING AWFUL MIGHT HAPPEN: NOT AT ALL
5. BEING SO RESTLESS THAT IT IS HARD TO SIT STILL: SEVERAL DAYS
2. NOT BEING ABLE TO STOP OR CONTROL WORRYING: SEVERAL DAYS
6. BECOMING EASILY ANNOYED OR IRRITABLE: SEVERAL DAYS
GAD7 TOTAL SCORE: 5
1. FEELING NERVOUS, ANXIOUS, OR ON EDGE: SEVERAL DAYS

## 2019-04-15 ASSESSMENT — MIFFLIN-ST. JEOR: SCORE: 1764.61

## 2019-04-15 NOTE — PROGRESS NOTES
SUBJECTIVE:   Coty Myles is a 49 year old female who presents to clinic today for the following   health issues:      Diabetes Follow-up    Patient is checking blood sugars: 10-20 times daily.    Blood sugar testing frequency justification: recently low am   Results are as follows:         104 now, 88 this morning    Diabetic concerns: other - hard time getting lows      Symptoms of hypoglycemia (low blood sugar): sweaty, lightheaded      Paresthesias (numbness or burning in feet) or sores: No     Date of last diabetic eye exam: 4/1/2019    Diabetes Management Resources    Hyperlipidemia Follow-Up      Rate your low fat/cholesterol diet?: fair    Taking statin?  Yes, muscle aches, possibly from other cause    Other lipid medications/supplements?:  none    Hypertension Follow-up      Outpatient blood pressures are being checked at home.  Results are normal.    Low Salt Diet: low salt    BP Readings from Last 2 Encounters:   04/15/19 124/85   03/05/19 130/80     Hemoglobin A1C (%)   Date Value   02/22/2019 9.7 (H)   11/14/2018 9.7 (H)     LDL Cholesterol Calculated (mg/dL)   Date Value   04/02/2018 130 (H)   05/01/2017     Cannot estimate LDL when triglyceride exceeds 400 mg/dL     LDL Cholesterol Direct (mg/dL)   Date Value   05/01/2017 92     Depression and Anxiety Follow-Up    Status since last visit: No change    Other associated symptoms:None    Complicating factors:     Significant life event: No     Current substance abuse: None    PHQ 2/27/2017 4/2/2018 11/14/2018   PHQ-9 Total Score 5 8 9   Q9: Thoughts of better off dead/self-harm past 2 weeks Not at all Not at all Not at all     NURIA-7 SCORE 2/27/2017   Total Score 0     m  PHQ-9  English  PHQ-9   Any Language  NURIA-7  Suicide Assessment Five-step Evaluation and Treatment (SAFE-T)  Asthma Follow-Up    Was ACT completed today?    Yes    ACT Total Scores 11/14/2018   ACT TOTAL SCORE -   ASTHMA ER VISITS -   ASTHMA HOSPITALIZATIONS -   ACT TOTAL SCORE (Goal  Greater than or Equal to 20) 23   In the past 12 months, how many times did you visit the emergency room for your asthma without being admitted to the hospital? 0   In the past 12 months, how many times were you hospitalized overnight because of your asthma? 0       Recent asthma triggers that patient is dealing with: None        Amount of exercise or physical activity: None    Problems taking medications regularly: No    Medication side effects: none    Diet: regular (no restrictions)            Additional history: as documented    Reviewed  and updated as needed this visit by clinical staff  Tobacco  Allergies  Meds  Med Hx  Surg Hx  Fam Hx  Soc Hx        Reviewed and updated as needed this visit by Provider         Patient Active Problem List   Diagnosis     Attention deficit disorder     Sciatica     Hypothyroidism     iamLUMBOSACRAL NEURITIS NOS     Hyperlipidemia LDL goal <100     Hypertension goal BP (blood pressure) < 130/80     Moderate major depression (H)     Family history of ischemic heart disease     Intermittent asthma     ASCUS with positive high risk HPV cervical     Morbid obesity (H)     Headache     Uncontrolled type 2 diabetes mellitus with complication (H)     Shoulder pain, left     Past Surgical History:   Procedure Laterality Date     BIOPSY       ENT SURGERY       ORTHOPEDIC SURGERY       SURGICAL HISTORY OF -   1990    right knee arthroscopic     SURGICAL HISTORY OF -   2000    lump on neck removed       Social History     Tobacco Use     Smoking status: Never Smoker     Smokeless tobacco: Never Used   Substance Use Topics     Alcohol use: Yes     Comment: 3 to 4 per year     Family History   Problem Relation Age of Onset     Heart Disease Father         heart attack at 64     Diabetes Father      C.A.D. Father      Coronary Artery Disease Father      Diabetes Paternal Grandmother      Cancer Paternal Grandfather         Lung CA     Diabetes Maternal Grandmother      Diabetes Mother   "    Gynecology Mother         hysterectomy- ovarian cyst     Respiratory Mother         COPD- dependent on oxygen, passed away at age 69     Allergies Mother         to percocet     Depression Mother      Mental Illness Mother      Obesity Mother      Diabetes Paternal Aunt      Diabetes Paternal Uncle      Alcohol/Drug Paternal Uncle      Diabetes Brother      Substance Abuse Other      Asthma Daughter      Cerebrovascular Disease No family hx of            ROS:  Constitutional, HEENT, cardiovascular, pulmonary, GI, , musculoskeletal, neuro, skin, endocrine and psych systems are negative, except as otherwise noted.    OBJECTIVE:     /85   Pulse 93   Temp 97.8  F (36.6  C) (Oral)   Ht 1.689 m (5' 6.5\")   Wt 112.5 kg (248 lb)   LMP 06/30/2016   BMI 39.43 kg/m    Body mass index is 39.43 kg/m .  GENERAL: healthy, alert and no distress  EYES: Eyes grossly normal to inspection, PERRL and conjunctivae and sclerae normal  HENT: ear canals and TM's normal, nose and mouth without ulcers or lesions  NECK: no adenopathy, no asymmetry, masses, or scars and thyroid normal to palpation  RESP: lungs clear to auscultation - no rales, rhonchi or wheezes  CV: regular rate and rhythm, normal S1 S2, no S3 or S4, no murmur, click or rub, no peripheral edema and peripheral pulses strong  ABDOMEN: soft, nontender, no hepatosplenomegaly, no masses and bowel sounds normal  MS: no gross musculoskeletal defects noted, no edema    Diagnostic Test Results:  Results for orders placed or performed in visit on 04/15/19 (from the past 24 hour(s))   Hemoglobin A1c   Result Value Ref Range    Hemoglobin A1C 8.6 (H) 0 - 5.6 %       ASSESSMENT/PLAN:       ICD-10-CM    1. Screen for colon cancer Z12.11    2. Screening for diabetic peripheral neuropathy Z13.89 FOOT EXAM  NO CHARGE [94275.114]     CANCELED: Albumin Random Urine Quantitative with Creat Ratio   3. Type 2 diabetes mellitus with complication, with long-term current use of " insulin (H) E11.8 pravastatin (PRAVACHOL) 20 MG tablet    Z79.4 Hemoglobin A1c     blood glucose (FREESTYLE LITE) test strip     metFORMIN (GLUCOPHAGE) 500 MG tablet     exenatide ER (BYDUREON) 2 MG pen     Albumin Random Urine Quantitative with Creat Ratio     DISCONTINUED: metFORMIN (GLUCOPHAGE) 500 MG tablet     DISCONTINUED: exenatide ER (BYDUREON) 2 MG pen   4. Other specified hypothyroidism E03.8 levothyroxine (SYNTHROID/LEVOTHROID) 88 MCG tablet     DISCONTINUED: levothyroxine (SYNTHROID/LEVOTHROID) 88 MCG tablet   5. Essential hypertension I10 lisinopril-hydrochlorothiazide (PRINZIDE/ZESTORETIC) 10-12.5 MG tablet     metoprolol tartrate (LOPRESSOR) 100 MG tablet     DISCONTINUED: lisinopril-hydrochlorothiazide (PRINZIDE/ZESTORETIC) 10-12.5 MG tablet     DISCONTINUED: metoprolol tartrate (LOPRESSOR) 100 MG tablet   6. High blood cholesterol E78.00 Lipid panel reflex to direct LDL Fasting     pravastatin (PRAVACHOL) 20 MG tablet   7. Morbid obesity (H) E66.01    8. Moderate major depression (H) F32.1    patient Is usually not motivated when it comes to her Diabetes mellitus   But since arabella monitoring was ordered about 6 weeks ago she has been working on her diet and continuous monitoring has motivated her to make better choices.     Hgba1c is lower now, 8.6%    Decrease 50units at night and  increase am to 60 units, closely [facility]  Follow up in the next 3-5 months  Htn/depression/headache/high cholesterol-stable  Hypothyroidism-stable, cont meds  See Patient Instructions    Graciela Gates DO  [facility]

## 2019-04-15 NOTE — PATIENT INSTRUCTIONS
Shingles shot as planned  Your Hgba1c is lower now, 8.6%  Great JOB!  Decrease 50units at night and my increase am to 60 units, closely montitor sugar  Follow up after new insurance in the next 3-5 months  Graciela Gates D.O.    [facility]   Discharged by : Charo De León MA    If you have any questions regarding your visit please contact your care team:     Team Gold                Clinic Hours Telephone Number     Dr. Grace Morris, CNP 7am-7pm  Monday - Thursday   7am-5pm  Fridays  [phone]   (Appointment scheduling available 24/7)     RN Line  [phone] option 2     [facility] [place] [facility] 11am-9pm Monday-Friday Saturday-Sunday- 9am-5pm     [place] -   5pm-9pm Monday-Friday Saturday-Sunday- 9am-5pm    [phone] - [place]    [phone] - [place]     For a Price Quote for your services, please call our Consumer Price Line at [phone].     What options do I have for visits at the clinic other than the traditional office visit?     To expand how we care for you, many of our providers are utilizing electronic visits (e-visits) and telephone visits, when medically appropriate, for interactions with their patients rather than a visit in the clinic. We also offer nurse visits for many medical concerns. Just like any other service, we will bill your insurance company for this type of visit based on time spent on the phone with your provider. Not all insurance companies cover these visits. Please check with your medical insurance if this type of visit is covered. You will be responsible for any charges that are not paid by your insurance.   E-visits via [employer]: generally incur a $45.00 fee.     Telephone visits:  Time spent on the phone: *charged based on time that is spent on the phone in increments of 10 minutes. Estimated cost:   5-10 mins $30.00   11-20 mins. $59.00   21-30 mins. $85.00     Use  [email or web address] (secure email communication and access to your chart) to send your primary care provider a message or make an appointment. Ask someone on your Team how to sign up for [employer].     As always, Thank you for trusting us with your health care needs!    [facility]:    Scheduling Appointments  [facility]  Call: [phone]    [facility] [place]  Call: [phone]    [facility]  Call: [phone]    For Gastroenterology referrals   [facility]   Clinics and Surgery Center, 4th Floor   [address] [place] [address]   Appointments: [phone]    WHERE TO GO FOR CARE?  Clinic    Make an appointment if you:       Are sick (cold, cough, flu, sore throat, earache or in pain).       Have a small injury (sprain, small cut, burn or broken bone).       Need a physical exam, Pap smear, vaccine or prescription refill.       Have questions about your health or medicines.    To reach us:      Call [phone] ([phone]). Open 24 hours every day. (For counseling services, call [phone].)    Log into [employer] at [email or web address]. (Visit [email or web address] to create an account.) Hospital emergency room    An emergency is a serious or life- threatening problem that must be treated right away.    Call 528 or get to the hospital if you have:      Very bad or sudden:            - Chest pain or pressure         - Bleeding         - Head or belly pain         - Dizziness or trouble seeing, walking or                          Speaking      Problems breathing      Blood in your vomit or you are coughing up blood      A major injury (knocked out, loss of a finger or limb, rape, broken bone protruding from skin)    A mental health crisis. (Or call the Mental Health Crisis line at 1-411.520.4048 or Suicide Prevention Hotline at 1-613.388.3574.)    Open 24 hours every day. You don't need an appointment.      Urgent care    Visit urgent care for sickness or small injuries when the clinic is closed. You don't need an appointment. To check hours or find an urgent care near you, visit [email or web address]. Online care    Get online care from [facility] for more than 70 common problems, like colds, allergies and infections. Open 24 hours every day at:   [email or web address]   Need help deciding?    For advice about where to be seen, you may call your clinic and ask to speak with a nurse. We're here for you 24 hours every day.         If you are deaf or hard of hearing, please let us know. We provide many free services including sign language interpreters, oral interpreters, TTYs, telephone amplifiers, note takers and written materials.

## 2019-04-16 LAB
CHOLEST SERPL-MCNC: 214 MG/DL
HDLC SERPL-MCNC: 43 MG/DL
LDLC SERPL CALC-MCNC: 111 MG/DL
NONHDLC SERPL-MCNC: 171 MG/DL
TRIGL SERPL-MCNC: 300 MG/DL

## 2019-04-16 ASSESSMENT — ANXIETY QUESTIONNAIRES: GAD7 TOTAL SCORE: 5

## 2019-04-22 NOTE — RESULT ENCOUNTER NOTE
"Your cholesterol is abnormal, please use the recommendations below and recheck labs in 6-12 months.    Ways to improve your cholesterol...    1- Eats less saturated fats (including avoiding \"trans\" fats).    2 - Eat more unsaturated fats  - found in vege  tables, grains, and tree nuts.   Also by replacing butter with canola oil or olive oil.    3 - Eat more nuts.   1-2 ounces (a small handful) of almonds, walnuts, hazelnuts or pecans once a  day in place of other less healthy snacks.    4 - Eat more high   fiber foods - vegetables and whole grains including oat bran, oats, beans, peas, and flax seed.    5 - Eat more fish - such as salmon, tuna, mackerel, and sardines.  1 or 2 six ounce servings per week is a healthy replacement for other proteins.    6 - E  xercise for at least 120 minutes per week - which is equal to 30 minutes 4 days per week.    Graciela Gates D.O.    "

## 2019-04-24 ENCOUNTER — TELEPHONE (OUTPATIENT)
Dept: FAMILY MEDICINE | Facility: CLINIC | Age: 49
End: 2019-04-24

## 2019-04-24 NOTE — TELEPHONE ENCOUNTER
Coty Myles is a 49 year old female who calls with low blood glucose episodes.     NURSING ASSESSMENT:  Description:  Patient reports she has had 8 instances in the past week where BG was in 70's.  Today at 12:20pm, level was 80, then she had a Nutrigrain bar and 10 minutes later it went up to 87. Patient was sweating a bit at the time, but is better now.  She denies any further sx at this time, such as weakness, sweating, shallow breathing, vision issues, dizziness. She is coherent on the phone.  Other times this week she has had symptoms as well, sometimes wakes up in the morning feeling sweaty/shaky, classic low BG symptoms.  Usually happens between 12am and 3-4am. She did forgo taking insulin at night a couple of times because of this. Patient reports she was ill over the weekend and was not eating a whole lot, and she has also been eating better and some different foods trying to control her diet. When she has these low BG episodes, she does eat right away, but it is taking about 30 minutes for her BG to start going up.   Onset/duration:  1 week  Precip. factors:  DMII and insulin use  Associated symptoms:  See above   Improves/worsens symptoms:  See above  Allergies:   Allergies   Allergen Reactions     Hmg-Coa-R Inhibitors Other (See Comments) and Muscle Pain (Myalgia)     Muscle pains     Meperidine      Percocet [Oxycodone-Acetaminophen]      Family history (mother would get a rash)     Prednisone Other (See Comments)     Night sweats     Trazodone Itching       NURSING PLAN: Nursing advice to patient See provider right away.    RECOMMENDED DISPOSITION:  See in 2-4 hours, another person to drive.  I offered an appointment with PCP this afternoon; however, patient states she has moved recently and lives out of town now. I instructed her to be seen in an urgent care nearby (preferably [place] since we have records) today until she can be seen and establishes with a new provider. Also instructed that she  "should seek emergency care/call 911 for low BG with symptoms such as sweating, dizziness, confusion and those symptoms reviewed above.  She voices understanding.  Will comply with recommendation: Yes  If further questions/concerns or if symptoms do not improve, worsen or new symptoms develop, call your PCP or [facility] as soon as possible.      Guideline used:  Telephone Triage Protocols for Nurses, Fifth Edition, Erma Aden  \"Diabetes Problems\"    KRISTOPHER QUICK RN    "

## 2019-05-13 ENCOUNTER — TELEPHONE (OUTPATIENT)
Dept: FAMILY MEDICINE | Facility: CLINIC | Age: 49
End: 2019-05-13

## 2019-05-13 DIAGNOSIS — E11.8 TYPE 2 DIABETES MELLITUS WITH COMPLICATION, WITH LONG-TERM CURRENT USE OF INSULIN (H): ICD-10-CM

## 2019-05-13 DIAGNOSIS — Z79.4 TYPE 2 DIABETES MELLITUS WITH COMPLICATION, WITH LONG-TERM CURRENT USE OF INSULIN (H): ICD-10-CM

## 2019-05-13 NOTE — TELEPHONE ENCOUNTER
Reason for Call:  Medication or medication refill:    Do you use a [facility]?  Name of the pharmacy and phone number for the current request:  attached below     Name of the medication requested: blood glucose (FREESTYLE LITE) test strip    Other request: Patient has changed insurance and doesn't know if they will cover the test strips. She is had placed the request early due to it might needing a prior auth. Please call patient with any questions.     Can we leave a detailed message on this number? YES    Phone number patient can be reached at: Home number on file [phone] (home)    Best Time: Anytime    Call taken on 5/13/2019 at 12:44 PM by Nisha Mcdermott

## 2019-05-14 RX ORDER — FLASH GLUCOSE SENSOR
KIT MISCELLANEOUS
Qty: 9 EACH | Refills: 0 | Status: SHIPPED | OUTPATIENT
Start: 2019-05-14 | End: 2019-07-12

## 2019-05-14 NOTE — TELEPHONE ENCOUNTER
Per chart review, PCP has ordered these in the past on 4/15/19 with a refill, so patient should have a refill available. Patient reports the message was taken down wrong. She needs sensors, not test strips.  These were last ordered by us on 3/6/19 for about 3 months worth. I instructed that I'll go ahead and send a new order to pharmacy, and if a PA is needed, we will be notified and will begin the process, will keep her posted as needed.  She voices understanding and states she may reach out to [facility] as well.    Abbey Gallegos, RN

## 2019-05-15 RX ORDER — FLASH GLUCOSE SCANNING READER
1 EACH MISCELLANEOUS
Qty: 1 DEVICE | Refills: 3 | Status: SHIPPED | OUTPATIENT
Start: 2019-05-15

## 2019-05-15 RX ORDER — FLASH GLUCOSE SENSOR
1 KIT MISCELLANEOUS
Qty: 6 EACH | Refills: 1 | Status: SHIPPED | OUTPATIENT
Start: 2019-05-15 | End: 2019-07-12

## 2019-05-15 NOTE — TELEPHONE ENCOUNTER
Route request for new order for Freestyle Aleida 14-day to PCP.  Orders pended.     Received fax from [facility] stating the 10-day reader is no longer available. They are wondering if patient has a reader/, otherwise they recommend the 14-day reader and sensor.      Patient reports she has the 10-day reader/sensor, so she will need the new 14-day reader and sensor.     Abbey Gallegos RN

## 2019-05-31 ENCOUNTER — MYC MEDICAL ADVICE (OUTPATIENT)
Dept: FAMILY MEDICINE | Facility: CLINIC | Age: 49
End: 2019-05-31

## 2019-06-03 NOTE — TELEPHONE ENCOUNTER
MyChart response sent, and denial has been forwarded to [facility] to see what can be done.  Since there is another encounter with [employer] pasted, will close this one.     Abbey Gallegos RN

## 2019-06-13 ENCOUNTER — TELEPHONE (OUTPATIENT)
Dept: FAMILY MEDICINE | Facility: CLINIC | Age: 49
End: 2019-06-13

## 2019-06-13 NOTE — TELEPHONE ENCOUNTER
Prior Authorization Retail Medication Request    Medication/Dose: exenatide ER (BYDUREON) 2 MG pen  ICD code (if different than what is on RX):  unknown  Previously Tried and Failed:  unknown  Rationale:  unknown    Insurance Name:  unknown  Insurance ID:  Key: [id number]      Pharmacy Information (if different than what is on RX)  [facility]  Phone:  [phone]

## 2019-06-17 NOTE — TELEPHONE ENCOUNTER
Central Prior Authorization Team  Phone: [phone]    PA Initiation    Medication: exenatide ER (BYDUREON) 2 MG pen  Insurance Company: [employer] [facility] - Phone [phone] Fax [phone]  Pharmacy Filling the Rx: [id number] - [place], [place] - [address] [place] [address]  Filling Pharmacy Phone: [phone]  Filling Pharmacy Fax:    Start Date: 6/17/2019

## 2019-06-18 NOTE — TELEPHONE ENCOUNTER
PRIOR AUTHORIZATION DENIED    Medication: exenatide ER (BYDUREON) 2 MG pen- DENIED    Denial Date: 6/18/2019    Denial Rational: PA was denied by insurance. Patient must have a history of trial & failure to the formulary alternative(s) or have a contraindication or intolerance to the formulary alternatives:  - Trulicity  - Ozempic  - Victoza       Appeal Information: If provider would like to appeal, please provide a letter of medical necessity.

## 2019-06-20 ENCOUNTER — TELEPHONE (OUTPATIENT)
Dept: FAMILY MEDICINE | Facility: CLINIC | Age: 49
End: 2019-06-20

## 2019-06-20 DIAGNOSIS — Z79.4 TYPE 2 DIABETES MELLITUS WITH COMPLICATION, WITH LONG-TERM CURRENT USE OF INSULIN (H): Primary | ICD-10-CM

## 2019-06-20 DIAGNOSIS — E11.8 TYPE 2 DIABETES MELLITUS WITH COMPLICATION, WITH LONG-TERM CURRENT USE OF INSULIN (H): Primary | ICD-10-CM

## 2019-06-20 NOTE — TELEPHONE ENCOUNTER
Reason for Call:  Medication or medication refill:    Do you use a [facility]?  No    Name of the pharmacy and phone number for the current request:    [facility]  [address] [place] [address]  [phone]       Name of the medication requested: insulin    Other request:  Patient is calling because she was told that her medications:  Humalog and Byeduron are no longer covered by her insurance.  Insurance will cover Novalog and Trulicity.  Patient is unsure if they are similar to the other 2 medications.  She would like a call back from a nurse to discuss this.  Can we leave a detailed message on this number? YES    Phone number patient can be reached at: Home number on file [phone] (home)    Best Time: Any    Call taken on 6/20/2019 at 5:06 PM by Rachelle Alvarez

## 2019-06-21 NOTE — TELEPHONE ENCOUNTER
Route request for diabetic medication alternatives to providers in PCP absence. Insurance is requesting Novolog in place of Humalog, and Trulicity in place of Bydureon.  Meds pended, but dose will need to be adjusted, depending on equivalency.     Returned call to patient and left detailed VM, as states okay below.  Instructed that the Humalog and Novolog are similar, short-acting insulins and the Bydureon and Trulicity are similar and same-class medications. Will route for alternatives as requested.    Abbey Gallegos RN

## 2019-06-21 NOTE — TELEPHONE ENCOUNTER
Reviewed. Will forward on to MTM to assist in medication changes - I believe the patient has see MTM in the past with initial start of a few of these.    Charo/MTM - PCP not available due to leave and we are very short on providers - any assistance in change overs / dose equivalents would be helpful.     Thank you.  Fer Jules, MPAS, PA-C

## 2019-06-25 ENCOUNTER — TELEPHONE (OUTPATIENT)
Dept: FAMILY MEDICINE | Facility: CLINIC | Age: 49
End: 2019-06-25

## 2019-06-25 NOTE — TELEPHONE ENCOUNTER
Prior Authorization Retail Medication Request    Medication/Dose: dulaglutide (TRULICITY) 1.5 MG/0.5ML pen  ICD code (if different than what is on RX):  na  Previously Tried and Failed:  na  Rationale:  na    Insurance Name:  darinel  Insurance ID:  na      Pharmacy Information (if different than what is on RX)  [facility]  Phone:  na

## 2019-06-25 NOTE — TELEPHONE ENCOUNTER
Med list reviewed. Based on insurance coverage and current diabetes control, I would suggest patient switch to Trulicity 1.5mg injected once weekly and Novolog 70/30 pen at the same dose, 55units BID with food.    I sent in updated Rx's to patient's pharmacy.    Called to review changes. No answer. LM asking patient to return call to number below to review the above changes. Specifically, want to discuss the difference between Bydureon and Trulicity injection devices.    Charo Roberts, Pharm.D., BCACP  Medication Therapy Management Pharmacist  [phone]    Lab Results   Component Value Date    A1C 8.6 04/15/2019    A1C 9.7 02/22/2019    A1C 9.7 11/14/2018    A1C 8.7 04/02/2018    A1C 9.5 10/06/2017

## 2019-06-25 NOTE — TELEPHONE ENCOUNTER
Patient returned call. Med changes reviewed. Injection technique with Trulicity reviewed at length. Patient verbalized understanding. She will call me with further questions.    Charo Roberts, Pharm.D., [facility]  Medication Therapy Management Pharmacist  [phone]

## 2019-06-28 NOTE — TELEPHONE ENCOUNTER
Central Prior Authorization Team   Phone: [phone]      PA Initiation    Medication: dulaglutide (TRULICITY) 1.5 MG/0.5ML pen  Insurance Company: [employer] [place] - Phone [phone] Fax [phone]  Pharmacy Filling the Rx: [id number] - [place], [place] - [address] [place] [address] STREET  Filling Pharmacy Phone: [phone]  Filling Pharmacy Fax:    Start Date: 6/28/2019

## 2019-07-01 NOTE — TELEPHONE ENCOUNTER
Prior Authorization Approval    Authorization Effective Date: 5/1/2019  Authorization Expiration Date: 6/29/2020  Medication: dulaglutide (TRULICITY) 1.5 MG/0.5ML pen  Approved Dose/Quantity:    Reference #: [id number]   Insurance Company: [employer] [place] - Phone [phone] Fax [phone]  Expected CoPay:       CoPay Card Available:      Foundation Assistance Needed:    Which Pharmacy is filling the prescription (Not needed for infusion/clinic administered): [id number] - [place], [place] - [address] [place] [address]  Pharmacy Notified: Yes  Patient Notified: Yes **Instructed pharmacy to notify patient when script is ready to /ship.**

## 2019-07-09 DIAGNOSIS — E11.9 TYPE 2 DIABETES MELLITUS (H): ICD-10-CM

## 2019-07-09 NOTE — TELEPHONE ENCOUNTER
"Requested Prescriptions   Pending Prescriptions Disp Refills     insulin pen needle (BD FRANCOISE U/F) 32G X 4 MM miscellaneous  Last Written Prescription Date:  3/26/2019  Last Fill Quantity: 100 each,  # refills: 1   Last office visit: 4/15/2019 with prescribing provider:  Kody   Future Office Visit:     100 each 1     Sig: USE TWICE DAILY       Diabetic Supplies Protocol Passed - 7/9/2019  8:01 AM        Passed - Medication is active on med list        Passed - Patient is 18 years of age or older        Passed - Recent (6 mo) or future (30 days) visit within the authorizing provider's specialty     Patient had office visit in the last 6 months or has a visit in the next 30 days with authorizing provider.  See \"Patient Info\" tab in inbasket, or \"Choose Columns\" in Meds & Orders section of the refill encounter.              "

## 2019-07-09 NOTE — TELEPHONE ENCOUNTER
Prescription approved per [facility] Refill Protocol.  Katerina Franklin RN

## 2019-07-11 ENCOUNTER — MYC MEDICAL ADVICE (OUTPATIENT)
Dept: FAMILY MEDICINE | Facility: CLINIC | Age: 49
End: 2019-07-11

## 2019-07-11 DIAGNOSIS — Z79.4 TYPE 2 DIABETES MELLITUS WITH COMPLICATION, WITH LONG-TERM CURRENT USE OF INSULIN (H): ICD-10-CM

## 2019-07-11 DIAGNOSIS — E11.8 TYPE 2 DIABETES MELLITUS WITH COMPLICATION, WITH LONG-TERM CURRENT USE OF INSULIN (H): ICD-10-CM

## 2019-07-12 RX ORDER — FLASH GLUCOSE SENSOR
1 KIT MISCELLANEOUS
Qty: 6 EACH | Refills: 1 | Status: SHIPPED | OUTPATIENT
Start: 2019-07-12

## 2019-07-12 NOTE — TELEPHONE ENCOUNTER
Freestyle Aleida 14 day sensor re-ordered and faxed to [facility] at [phone]. MyChart sent and will leave encounter open in case of reply or further issues.    Abbey Gallegos RN

## 2019-07-15 ENCOUNTER — TELEPHONE (OUTPATIENT)
Dept: FAMILY MEDICINE | Facility: CLINIC | Age: 49
End: 2019-07-15

## 2019-07-18 NOTE — TELEPHONE ENCOUNTER
I can switch her to trulicity , please call and discuss with patient.  Thanks  Graciela Gates D.O.

## 2019-07-22 NOTE — TELEPHONE ENCOUNTER
I am confused, it looks like Trulicity was prescribed back on 6/25/19 with PA approved by insurance, so unsure why Bydureon is even being requested?  Reached out to patient to clarify and see if she has been taking the Trulicity.  Patient/family was instructed to return call to [facility] clinic RN directly on the RN Call back line at [phone].     Abbey Gallegos RN

## 2019-07-23 NOTE — TELEPHONE ENCOUNTER
2nd attempt.  Patient/family was instructed to return call to [facility] RN directly on the RN Call back line at [phone].     Abbey Gallegos RN

## 2019-07-23 NOTE — TELEPHONE ENCOUNTER
Please see 5/20/19 telephone encounter this medication is excluded from coverage per patient's pharmacy insurance benefits. Freestyle arabella would fall under their medical benefits/Part B, not pharmacy benefits, as it is considered DME. The central PA team does not handle medical claims, routing back to clinic.  Please note, per erx notes the [facility] Team have tried reaching out to patient three times without success on gathering information (medical and prescription information).

## 2019-07-26 NOTE — TELEPHONE ENCOUNTER
Closing encounter at this time, as no response from patient, and according to chart review, she should have Trulicity already.    Abbey Gallegos RN

## 2019-10-02 ENCOUNTER — HEALTH MAINTENANCE LETTER (OUTPATIENT)
Age: 49
End: 2019-10-02

## 2019-10-21 ENCOUNTER — TELEPHONE (OUTPATIENT)
Dept: FAMILY MEDICINE | Facility: CLINIC | Age: 49
End: 2019-10-21

## 2019-10-21 NOTE — LETTER
[address] [id number] [phone]                                                                                                October 29, 2019    Coty Myles  [address] [facility] [address] [facility] [address]        Dear MsMinda Shameka,    We care about your health and have reviewed your health plan. We have reviewed your medical conditions, medication list, and lab results and are making recommendations based on this review, to better manage your health.    You are in particular need of attention regarding:    - Asthma  - Diabetes Care   - Scheduling an Annual Physical / Wellness Visit with your primary care provider  - High blood pressure    Please call us at [phone] (or use [employer]) to address the above recommendations.     Thank you for trusting [facility] with your healthcare needs. We appreciate the opportunity to serve you and look forward to supporting you in the future.    Healthy Regards,    Your Care Team

## 2019-10-21 NOTE — TELEPHONE ENCOUNTER
Panel Management Review      Patient has the following on her problem list:     Diabetes    ASA: Failed    Last A1C  Lab Results   Component Value Date    A1C 8.6 04/15/2019    A1C 9.7 02/22/2019    A1C 9.7 11/14/2018    A1C 8.7 04/02/2018    A1C 9.5 10/06/2017     A1C tested: FAILED    Last LDL:    Lab Results   Component Value Date    CHOL 214 04/15/2019     Lab Results   Component Value Date    HDL 43 04/15/2019     Lab Results   Component Value Date     04/15/2019     Lab Results   Component Value Date    TRIG 300 04/15/2019     Lab Results   Component Value Date    CHOLHDLRATIO 2.1 08/28/2015     Lab Results   Component Value Date    NHDL 171 04/15/2019       Is the patient on a Statin? YES             Is the patient on Aspirin? NO    Medications     HMG CoA Reductase Inhibitors     pravastatin (PRAVACHOL) 20 MG tablet        STATIN NOT PRESCRIBED, INTENTIONAL,             Last three blood pressure readings:  BP Readings from Last 3 Encounters:   04/15/19 124/85   03/05/19 130/80   02/22/19 136/82       Date of last diabetes office visit: 4/15/19     Tobacco History:     History   Smoking Status     Never Smoker   Smokeless Tobacco     Never Used           Composite cancer screening  Chart review shows that this patient is due/due soon for the following None  Summary:    Patient is due/failing the following:   A1C, ACT, PHQ9 and PHYSICAL    Action needed:   Patient needs office visit for Physical and Chronic disease management., Patient needs to do ACT. and Patient needs to do PHQ9.    Type of outreach:    Sent [employer] message.    Questions for provider review:    None                                                                                                                                    Charo De León MA       Chart routed to Care Team .

## 2019-10-22 DIAGNOSIS — E11.9 TYPE 2 DIABETES MELLITUS (H): ICD-10-CM

## 2019-10-22 NOTE — TELEPHONE ENCOUNTER
"Requested Prescriptions   Pending Prescriptions Disp Refills     insulin pen needle (BD FRANCOISE U/F) 32G X 4 MM miscellaneous  Last Written Prescription Date:  7/9/2019  Last Fill Quantity: 100 each,  # refills: 1   Last office visit: 4/15/2019 with prescribing provider:  CANELO Gates   Future Office Visit:   100 each 1     Sig: USE TWICE DAILY       Diabetic Supplies Protocol Failed - 10/22/2019  6:57 PM        Failed - Recent (6 mo) or future (30 days) visit within the authorizing provider's specialty     Patient had office visit in the last 6 months or has a visit in the next 30 days with authorizing provider.  See \"Patient Info\" tab in inbasket, or \"Choose Columns\" in Meds & Orders section of the refill encounter.            Passed - Medication is active on med list        Passed - Patient is 18 years of age or older        "

## 2019-10-25 NOTE — TELEPHONE ENCOUNTER
She moved and has switched providers I think  Please call and confirm  Otherwise due for a viist  Ok to refill after appoint made  Graciela Gates D.O.

## 2019-10-25 NOTE — TELEPHONE ENCOUNTER
I spoke to patient. She states that she has moved and that she has seen a provider in [place] but that the pharmacy and doctor haven't quite got all of her prescriptions right. She asked for one refill of her insulin needles because she only has 2 left. Will send in one refill. Messaged pharmacy to contact her current provider for any further refills.  Veronica Saeed RN

## 2019-10-25 NOTE — TELEPHONE ENCOUNTER
Routing refill request to provider for review/approval because:  Patient needs to be seen because:  6 months since last OV    Sweetie Toure, RN, BSN, PHN  [facility]: [place]

## 2019-10-29 NOTE — TELEPHONE ENCOUNTER
Panel Management Review  Summary:    Type of outreach:    Sent letter.    Encounter routed to No Action Needed.                                                                                                                                 Nichelle Low MA

## 2019-10-30 ENCOUNTER — HEALTH MAINTENANCE LETTER (OUTPATIENT)
Age: 49
End: 2019-10-30

## 2019-11-15 ENCOUNTER — TELEPHONE (OUTPATIENT)
Dept: FAMILY MEDICINE | Facility: CLINIC | Age: 49
End: 2019-11-15

## 2019-11-15 DIAGNOSIS — E11.8 TYPE 2 DIABETES MELLITUS WITH COMPLICATION, WITH LONG-TERM CURRENT USE OF INSULIN (H): ICD-10-CM

## 2019-11-15 DIAGNOSIS — Z79.4 TYPE 2 DIABETES MELLITUS WITH COMPLICATION, WITH LONG-TERM CURRENT USE OF INSULIN (H): ICD-10-CM

## 2019-11-15 NOTE — TELEPHONE ENCOUNTER
I spoke to patient. She states that she has moved and that she has seen a provider in [place] but that the pharmacy and doctor haven't quite got all of her prescriptions right. She asked for one refill of her insulin needles because she only has 2 left. Will send in one refill. Messaged pharmacy to contact her current provider for any further refills.  Veronica Saeed RN         This was previous refill request  Call again and let her know she needs to establish care please  Refilled for now  Please change her pcp as well  Graciela Gates D.O.

## 2019-11-15 NOTE — TELEPHONE ENCOUNTER
"Requested Prescriptions   Pending Prescriptions Disp Refills     metFORMIN (GLUCOPHAGE) 500 MG tablet  Last Written Prescription Date:  4/15/2019  Last Fill Quantity: 450 tablet,  # refills: 1   Last office visit: 4/15/2019 with prescribing provider:  CANELO Gates   Future Office Visit:   450 tablet 1     Sig: TAKE 3 TABLETS BY MOUTH IN THE MORNING AND 2 TABLETS IN THE EVENING.       Biguanide Agents Failed - 11/15/2019  8:20 AM        Failed - Blood pressure less than 140/90 in past 6 months     BP Readings from Last 3 Encounters:   04/15/19 124/85   03/05/19 130/80   02/22/19 136/82             Failed - Patient has had a Microalbumin in the past 15 mos.     Recent Labs   Lab Test 04/02/18  0923   MICROL 42   UMALCR 16.63             Failed - Patient has documented A1c within the specified period of time.     If HgbA1C is 8 or greater, it needs to be on file within the past 3 months.  If less than 8, must be on file within the past 6 months.     Recent Labs   Lab Test 04/15/19  1040   A1C 8.6*             Failed - Recent (6 mo) or future (30 days) visit within the authorizing provider's specialty     Patient had office visit in the last 6 months or has a visit in the next 30 days with authorizing provider or within the authorizing provider's specialty.  See \"Patient Info\" tab in inbasket, or \"Choose Columns\" in Meds & Orders section of the refill encounter.            Passed - Patient has documented LDL within the past 12 mos.     Recent Labs   Lab Test 04/15/19  1040   *             Passed - Patient is age 10 or older        Passed - Patient's CR is NOT>1.4 OR Patient's EGFR is NOT<45 within past 12 mos.     Recent Labs   Lab Test 02/22/19  0917  10/19/12   GFR  --   --  90   GFRB  --   --  78   GFRESTIMATED 86   < >  --    GFRESTBLACK >90   < >  --     < > = values in this interval not displayed.       Recent Labs   Lab Test 02/22/19  0917  10/19/12   CR 0.80   < >  --    CRPOC  --   --  0.8    < > = values " in this interval not displayed.             Passed - Patient does NOT have a diagnosis of CHF.        Passed - Medication is active on med list        Passed - Patient is not pregnant        Passed - Patient has not had a positive pregnancy test within the past 12 mos.

## 2019-11-15 NOTE — TELEPHONE ENCOUNTER
Routing refill request to provider for review/approval because:  Labs out of range:  BP, microalbumin, A1C    Sweetie Toure RN, BSN, PHN  [facility] [place]

## 2020-01-10 DIAGNOSIS — E11.8 TYPE 2 DIABETES MELLITUS WITH COMPLICATION, WITH LONG-TERM CURRENT USE OF INSULIN (H): ICD-10-CM

## 2020-01-10 DIAGNOSIS — Z79.4 TYPE 2 DIABETES MELLITUS WITH COMPLICATION, WITH LONG-TERM CURRENT USE OF INSULIN (H): ICD-10-CM

## 2020-01-10 NOTE — TELEPHONE ENCOUNTER
Requested Prescriptions   Pending Prescriptions Disp Refills     Continuous Blood Gluc Sensor (FREESTYLE ANNA 14 DAY SENSOR) MISC [Pharmacy Med Name: FREESTYLE ANNA 14 DAY SENSO  MISC]        Last Written Prescription Date:  7/12/2019  Last Fill Quantity: 6 each,   # refills: 1  Last Office Visit: 4/15/2019  w/ CANELO Gates    Future Office visit:       Routing refill request to provider for review/approval because:  Drug not on the G, P or  Health refill protocol or controlled substance 6 each 0     Sig: USE AND REPLACE SENSOR EVERY 14 DAYS       There is no refill protocol information for this order

## 2020-01-13 RX ORDER — FLASH GLUCOSE SENSOR
KIT MISCELLANEOUS
Qty: 6 EACH | Refills: 0 | OUTPATIENT
Start: 2020-01-13

## 2020-03-22 ENCOUNTER — HEALTH MAINTENANCE LETTER (OUTPATIENT)
Age: 50
End: 2020-03-22

## 2020-04-20 ENCOUNTER — TELEPHONE (OUTPATIENT)
Dept: FAMILY MEDICINE | Facility: CLINIC | Age: 50
End: 2020-04-20

## 2020-04-20 DIAGNOSIS — E03.8 OTHER SPECIFIED HYPOTHYROIDISM: Chronic | ICD-10-CM

## 2020-04-20 DIAGNOSIS — E11.8 TYPE 2 DIABETES MELLITUS WITH COMPLICATION, WITH LONG-TERM CURRENT USE OF INSULIN (H): ICD-10-CM

## 2020-04-20 DIAGNOSIS — Z79.4 TYPE 2 DIABETES MELLITUS WITH COMPLICATION, WITH LONG-TERM CURRENT USE OF INSULIN (H): ICD-10-CM

## 2020-04-21 NOTE — TELEPHONE ENCOUNTER
"Requested Prescriptions   Pending Prescriptions Disp Refills     levothyroxine (SYNTHROID/LEVOTHROID) 88 MCG tablet [Pharmacy Med Name: LEVOTHYROXINE SODIUM 88MCG TABS]  Last Written Prescription Date:  4/15/2019  Last Fill Quantity: 90 tablet,  # refills: 3   Last office visit: 4/15/2019 with prescribing provider:  CANELO Gates   Future Office Visit:   90 tablet 3     Sig: TAKE ONE TABLET BY MOUTH ONCE DAILY       Thyroid Protocol Failed - 4/20/2020  8:35 AM        Failed - Recent (12 mo) or future (30 days) visit within the authorizing provider's specialty     Patient has had an office visit with the authorizing provider or a provider within the authorizing providers department within the previous 12 mos or has a future within next 30 days. See \"Patient Info\" tab in inbasket, or \"Choose Columns\" in Meds & Orders section of the refill encounter.              Failed - Normal TSH on file in past 12 months     Recent Labs   Lab Test 02/22/19  0917   TSH 9.07*              Passed - Patient is 12 years or older        Passed - Medication is active on med list        Passed - No active pregnancy on record     If patient is pregnant or has had a positive pregnancy test, please check TSH.          Passed - No positive pregnancy test in past 12 months     If patient is pregnant or has had a positive pregnancy test, please check TSH.                   BYDUREON 2 MG pen [Pharmacy Med Name: BYDUREON 2MG PEN]  Last Written Prescription Date:  2/4/2019  Last Fill Quantity: 12 each,  # refills: 3   Last office visit: 4/15/2019 with prescribing provider:  CANELO Gates   Future Office Visit:   12 each 3     Sig: INJECT 2MG SUBCUTANEOUSLY EVERY 7 DAYS       GLP-1 Agonists Protocol Failed - 4/20/2020  8:35 AM        Failed - HgbA1C in past 3 or 6 months     If HgbA1C is 8 or greater, it needs to be on file within the past 3 months.  If less than 8, must be on file within the past 6 months.     Recent Labs   Lab Test 04/15/19  1040   A1C " "8.6*             Failed - Medication is active on med list        Failed - Normal serum creatinine on file in past 12 months     Recent Labs   Lab Test 02/22/19  0917  10/19/12   CR 0.80   < >  --    CRPOC  --   --  0.8    < > = values in this interval not displayed.       Ok to refill medication if creatinine is low          Failed - Recent (6 mo) or future (30 days) visit within the authorizing provider's specialty     Patient had office visit in the last 6 months or has a visit in the next 30 days with authorizing provider.  See \"Patient Info\" tab in inbasket, or \"Choose Columns\" in Meds & Orders section of the refill encounter.            Passed - Patient is age 18 or older        Passed - No active pregnancy on record        Passed - No positive pregnancy test in past 12 months           "

## 2020-04-22 NOTE — TELEPHONE ENCOUNTER
She has transferred care, please call her , update the chart to her new pcp so meds don't come here for refill  I have not seen her in more than a  Year  Graciela Gates D.O.

## 2020-04-22 NOTE — TELEPHONE ENCOUNTER
Routing refill request to provider for review/approval because:  Patient needs to be seen because it has been more than 1 year since last office visit.    Marissa Chou, RN, BSN

## 2020-04-23 NOTE — TELEPHONE ENCOUNTER
Called patient and left a voicemail message and relayed Dr Gates message below.    Melissa Dhillon

## 2020-04-24 RX ORDER — LEVOTHYROXINE SODIUM 88 UG/1
TABLET ORAL
Qty: 90 TABLET | Refills: 3 | OUTPATIENT
Start: 2020-04-24

## 2020-04-24 RX ORDER — EXENATIDE 2 MG/.65ML
INJECTION, SUSPENSION, EXTENDED RELEASE SUBCUTANEOUS
Qty: 12 EACH | Refills: 3 | OUTPATIENT
Start: 2020-04-24

## 2020-04-24 NOTE — TELEPHONE ENCOUNTER
Meds refused to to pharmacy with note that patient no longer under provider care.  Closing encounter.    Abbey Gallegos RN

## 2020-06-07 DIAGNOSIS — E11.9 TYPE 2 DIABETES MELLITUS (H): ICD-10-CM

## 2020-06-10 RX ORDER — PEN NEEDLE, DIABETIC 32GX 5/32"
NEEDLE, DISPOSABLE MISCELLANEOUS
Qty: 100 EACH | Refills: 1 | OUTPATIENT
Start: 2020-06-10

## 2020-06-10 NOTE — TELEPHONE ENCOUNTER
Refill refused. Per chart review patient no longer under provider's care and has transferred to another clinic. Note to pharmacy.     Zee Turner RN

## 2020-07-14 DIAGNOSIS — I10 ESSENTIAL HYPERTENSION: ICD-10-CM

## 2020-07-14 NOTE — LETTER
[address] [place] [id number] [phone]                                                                                                July 23, 2020      Coty Myles  [address] [facility] [place] [address]        Dear MsMinda Shameka,      We have attempted to reach you regarding your refill request, but have been unsuccessful.    We have received a refill request for one of your medications. We have sent a refill of your medication to your pharmacy, however your provider has noted that you will need to be seen for a follow up visit in order to continue this medication.    Please contact us at [phone] to schedule an appointment with your provider before your next refill is due.      Thank you,      Your Health Care Team at the [facility]

## 2020-07-16 RX ORDER — METOPROLOL TARTRATE 100 MG
TABLET ORAL
Qty: 60 TABLET | Refills: 0 | Status: SHIPPED | OUTPATIENT
Start: 2020-07-16

## 2020-07-16 NOTE — TELEPHONE ENCOUNTER
Medication is being filled for 1 time refill only due to:  Patient needs to be seen because it has been more than one year since last visit.     Will route to team to reach out to patient to schedule.     PAN GiffordN, RN  [facility]

## 2020-07-17 ENCOUNTER — TELEPHONE (OUTPATIENT)
Dept: FAMILY MEDICINE | Facility: CLINIC | Age: 50
End: 2020-07-17

## 2020-07-17 DIAGNOSIS — Z79.4 TYPE 2 DIABETES MELLITUS WITH COMPLICATION, WITH LONG-TERM CURRENT USE OF INSULIN (H): ICD-10-CM

## 2020-07-17 DIAGNOSIS — E11.8 TYPE 2 DIABETES MELLITUS WITH COMPLICATION, WITH LONG-TERM CURRENT USE OF INSULIN (H): ICD-10-CM

## 2020-07-21 NOTE — TELEPHONE ENCOUNTER
Patient hasn't been seen by Dr. Gates since 4/2019.    Please outreach to recommend virtual visit at minimum to discuss diabetes/refill request.    Daxa Ma, DNP, APRN, CNP

## 2020-07-21 NOTE — TELEPHONE ENCOUNTER
Routing refill request to provider for review/approval because:  Labs not current:  A1C, Cr  Patient needs to be seen because it has been more than 6 months since last office visit.

## 2020-07-21 NOTE — TELEPHONE ENCOUNTER
LVM for patient to return call to clinic to schedule virtual visit for refills.     Charo De León MA

## 2020-07-22 NOTE — TELEPHONE ENCOUNTER
2nd attempt. LMOM for patient to call back main line to schedule an appointment.    Thank you,  Jenny POWELL  [facility]  Team Neda Coordinator

## 2020-07-23 RX ORDER — INSULIN ASPART 100 [IU]/ML
INJECTION, SUSPENSION SUBCUTANEOUS
Qty: 45 ML | Refills: 11 | OUTPATIENT
Start: 2020-07-23

## 2020-07-23 NOTE — TELEPHONE ENCOUNTER
Per chart review patient no longer under provider care and has changed clinics and providers. Note to pharmacy with refusal of medications.     Zee Turner RN

## 2020-07-23 NOTE — TELEPHONE ENCOUNTER
Patient contacted several times on 2 different encounters inregrds to refill. Letter mailed.    RN please close encounter.    Thank you,  Jenny POWELL  [facility]  Team Neda Coordinator

## 2020-07-31 DIAGNOSIS — I10 ESSENTIAL HYPERTENSION: ICD-10-CM

## 2020-08-03 RX ORDER — METOPROLOL TARTRATE 100 MG
TABLET ORAL
Qty: 60 TABLET | Refills: 0 | OUTPATIENT
Start: 2020-08-03

## 2020-08-03 NOTE — TELEPHONE ENCOUNTER
Routing refill request to provider for review/approval because:  Labs out of range:  Blood pressure  Patient notified last month she is due to be seen. Thank you.       Zee Turner RN

## 2021-01-15 ENCOUNTER — HEALTH MAINTENANCE LETTER (OUTPATIENT)
Age: 51
End: 2021-01-15

## 2021-01-19 ENCOUNTER — PATIENT OUTREACH (OUTPATIENT)
Dept: FAMILY MEDICINE | Facility: CLINIC | Age: 51
End: 2021-01-19

## 2021-01-19 DIAGNOSIS — R87.810 ASCUS WITH POSITIVE HIGH RISK HPV CERVICAL: ICD-10-CM

## 2021-01-19 DIAGNOSIS — R87.610 ASCUS WITH POSITIVE HIGH RISK HPV CERVICAL: ICD-10-CM

## 2021-05-15 ENCOUNTER — HEALTH MAINTENANCE LETTER (OUTPATIENT)
Age: 51
End: 2021-05-15

## 2021-05-24 ENCOUNTER — RECORDS - HEALTHEAST (OUTPATIENT)
Dept: ADMINISTRATIVE | Facility: CLINIC | Age: 51
End: 2021-05-24

## 2021-05-25 ENCOUNTER — RECORDS - HEALTHEAST (OUTPATIENT)
Dept: ADMINISTRATIVE | Facility: CLINIC | Age: 51
End: 2021-05-25

## 2021-05-28 ENCOUNTER — RECORDS - HEALTHEAST (OUTPATIENT)
Dept: ADMINISTRATIVE | Facility: CLINIC | Age: 51
End: 2021-05-28

## 2021-09-04 ENCOUNTER — HEALTH MAINTENANCE LETTER (OUTPATIENT)
Age: 51
End: 2021-09-04

## 2021-12-25 ENCOUNTER — HEALTH MAINTENANCE LETTER (OUTPATIENT)
Age: 51
End: 2021-12-25

## 2022-02-19 ENCOUNTER — HEALTH MAINTENANCE LETTER (OUTPATIENT)
Age: 52
End: 2022-02-19

## 2022-04-16 ENCOUNTER — HEALTH MAINTENANCE LETTER (OUTPATIENT)
Age: 52
End: 2022-04-16

## 2022-08-06 ENCOUNTER — HEALTH MAINTENANCE LETTER (OUTPATIENT)
Age: 52
End: 2022-08-06

## 2022-10-22 ENCOUNTER — HEALTH MAINTENANCE LETTER (OUTPATIENT)
Age: 52
End: 2022-10-22

## 2022-12-04 ENCOUNTER — HEALTH MAINTENANCE LETTER (OUTPATIENT)
Age: 52
End: 2022-12-04

## 2023-04-01 ENCOUNTER — HEALTH MAINTENANCE LETTER (OUTPATIENT)
Age: 53
End: 2023-04-01

## 2023-08-21 NOTE — LETTER
[address] [address]  Phone: [phone]        October 3, 2018      Coty Myles                                                                                                                         [address] [facility] [address]        Navi Ansari,        We have attempted to reach you regarding your refill request, but have been unsuccessful.    We have received a refill request for one of your medications. We have sent a refill of your medication to your pharmacy, however your provider has noted that you will need to be seen for a follow up visit in order to continue this medication.    Please contact us at [phone] to schedule an appointment with your provider before your next refill is due.      Thank you,      Your Health Care Team at the [facility]             Bactrim Counseling:  I discussed with the patient the risks of sulfa antibiotics including but not limited to GI upset, allergic reaction, drug rash, diarrhea, dizziness, photosensitivity, and yeast infections.  Rarely, more serious reactions can occur including but not limited to aplastic anemia, agranulocytosis, methemoglobinemia, blood dyscrasias, liver or kidney failure, lung infiltrates or desquamative/blistering drug rashes.

## 2023-08-27 ENCOUNTER — HEALTH MAINTENANCE LETTER (OUTPATIENT)
Age: 53
End: 2023-08-27

## 2024-01-14 ENCOUNTER — HEALTH MAINTENANCE LETTER (OUTPATIENT)
Age: 54
End: 2024-01-14

## 2024-04-11 NOTE — LETTER
"[employer] [employer]  [address] [place] [id number] [phone]                                                                                                April 22, 2019    Coty Myles  [address] [place] [address] [place] [address]        Dear Ms. Myles,    Your cholesterol is abnormal, please use the recommendations below and recheck labs in 6-12 months.     Ways to improve your cholesterol...     1- Eats less saturated fats (including avoiding \"trans\" fats).     2 - Eat more unsaturated fats  - found in vege   tables, grains, and tree nuts.   Also by replacing butter with canola oil or olive oil.     3 - Eat more nuts.   1-2 ounces (a small handful) of almonds, walnuts, hazelnuts or pecans once a day in place of other less healthy snacks.     4 - Eat more high   fiber foods - vegetables and whole grains including oat bran, oats, beans, peas, and flax seed.     5 - Eat more fish - such as salmon, tuna, mackerel, and sardines.  1 or 2 six ounce servings per week is a healthy replacement for other proteins.     6 - Exercise for at least 120 minutes per week - which is equal to 30 minutes 4 days per week.      Sincerely,      Graciela Gates DO/magui    Results for orders placed or performed in visit on 04/15/19   Lipid panel reflex to direct LDL Fasting   Result Value Ref Range    Cholesterol 214 (H) <200 mg/dL    Triglycerides 300 (H) <150 mg/dL    HDL Cholesterol 43 (L) >49 mg/dL    LDL Cholesterol Calculated 111 (H) <100 mg/dL    Non HDL Cholesterol 171 (H) <130 mg/dL   Hemoglobin A1c   Result Value Ref Range    Hemoglobin A1C 8.6 (H) 0 - 5.6 %       "
Will identify 2 coping skills that assist in improving mood

## 2025-03-12 NOTE — PROGRESS NOTES
SUBJECTIVE:     CC: Coty Myles is an 46 year old woman who presents for preventive health visit.     Physical   Annual:     Getting at least 3 servings of Calcium per day::  NO    Bi-annual eye exam::  Yes    Dental care twice a year::  Yes    Sleep apnea or symptoms of sleep apnea::  Daytime drowsiness    Diet::  Carbohydrate counting    Frequency of exercise::  1 day/week    Duration of exercise::  15-30 minutes    Taking medications regularly::  Yes    Medication side effects::  None    Additional concerns today::  YES        Diabetes Follow-up    Patient is checking blood sugars: once daily.  Results are as follows:         Last reading yesterday was 241 and she reports not remembering any other readings    Diabetic concerns: None     Symptoms of hypoglycemia (low blood sugar): none     Paresthesias (numbness or burning in feet) or sores: No     Date of last diabetic eye exam: needs to make an appointment   She was switched endocrinologist and was prescribed invokana    Today's PHQ-2 Score:   PHQ-2 ( 1999 Pfizer) 2/24/2017   Little interest or pleasure in doing things Not at all   Feeling down, depressed or hopeless Not at all   PHQ-2 Score 0       Abuse: Current or Past(Physical, Sexual or Emotional)- No  Do you feel safe in your environment - Yes    Social History   Substance Use Topics     Smoking status: Never Smoker     Smokeless tobacco: Never Used     Alcohol use Yes      Comment: 3 to 4 per year     The patient does not drink >3 drinks per day nor >7 drinks per week.    Recent Labs   Lab Test  11/27/15   1037  08/28/15   0859   05/09/13   1111   CHOL  136  113   --   132   HDL  52  53   --   48*   LDL  44  28   < >  43   TRIG  199*  159*   --   210*   CHOLHDLRATIO   --   2.1   --   2.8   NHDL  84   --    --    --     < > = values in this interval not displayed.       Reviewed orders with patient.  Reviewed health maintenance and updated orders accordingly - Yes    Mammo Decision Support:  Patient  Subjective   Reason for Visit: Amanda Toro is an 68 y.o. female here for a Medicare Wellness visit.     Past Medical, Surgical, and Family History reviewed and updated in chart.    Reviewed all medications by prescribing practitioner or clinical pharmacist (such as prescriptions, OTCs, herbal therapies and supplements) and documented in the medical record.    Pt is here for annual wellness.  Reports overall health is fine no problems, reviewed depression screening     Do you take any herbs or supplements that were not prescribed by a doctor? Yes mag for migarne, vit c, vit d3  Are you taking calcium supplements? yes  Are you taking aspirin daily? 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 yes     1. Patient Counseling:  --Nutrition: Stressed importance of moderation in sodium/caffeine intake, saturated fat and cholesterol, caloric balance, sufficient intake of fresh fruits, vegetables, fiber, calcium, iron, and 1 mg of folate supplement per day (for females capable of pregnancy).  --Discussed the issue of estrogen replacement, calcium supplement, and the daily use of baby aspirin.  --Exercise: Stressed the importance of regular exercise.   --Substance Abuse: Discussed cessation/primary prevention of tobacco, alcohol, or other drug use; driving or other dangerous activities under the influence; availability of treatment for abuse.    --Sexuality: Discussed sexually transmitted diseases, partner selection, use of condoms, avoidance of unintended pregnancy  and contraceptive alternatives.   --Injury prevention: Discussed safety belts, safety helmets, smoke detector, smoking near bedding or upholstery.   --Dental health:  "Discussed importance of regular tooth brushing, flossing, and dental visits.  --Immunizations reviewed.  --Discussed benefits of screening colonoscopy.  --After hours service discussed with patient  2 Discussed the patient's BMI with her.  The BMI is above average. The patient received Provided instructions on dietary changes In a face to face session, I informed patient of his/her BMI > 30. We discussed appropriate nutrition choices and exercise plan to help achieve weight reduction.   Provided instructions on exercise because they have an above normal BMI.  3 Follow up as needed for acute illness          Patient Care Team:  GUZMAN Dalton-CNP as PCP - General (Family Medicine)     Review of Systems   Constitutional:  Negative for fatigue, fever and unexpected weight change.   HENT:  Negative for congestion, ear discharge, ear pain, nosebleeds, postnasal drip, rhinorrhea, sinus pressure, sinus pain, sneezing, sore throat and trouble swallowing.    Respiratory:  Negative for cough, shortness of breath and wheezing.    Cardiovascular:  Negative for chest pain, palpitations and leg swelling.   Gastrointestinal:  Negative for abdominal pain, constipation and nausea.   Genitourinary:  Negative for dysuria, hematuria, menstrual problem, pelvic pain, vaginal bleeding and vaginal pain.   Musculoskeletal:  Negative for arthralgias, back pain, gait problem and joint swelling.   Skin:  Negative for rash and wound.   Neurological:  Positive for headaches. Negative for seizures, facial asymmetry, weakness and light-headedness.   Psychiatric/Behavioral: Negative.         Objective   Vitals:  /81   Pulse 90   Ht 1.6 m (5' 3\")   Wt 94.8 kg (209 lb)   SpO2 97%   BMI 37.02 kg/m²       Physical Exam  Constitutional:       Appearance: Normal appearance.   HENT:      Head: Normocephalic and atraumatic.      Right Ear: Tympanic membrane and external ear normal.      Left Ear: Tympanic membrane and external ear normal. " under age 50, mutual decision reflected in health maintenance.      Pertinent mammograms are reviewed under the imaging tab.  History of abnormal Pap smear: NO - age 30- 65 PAP every 3 years recommended  All Histories reviewed and updated in Epic.  Past Medical History   Diagnosis Date     Alexia and dyslexia      Allergic rhinitis due to other allergen      ASCUS on Pap smear 13     neg HPV. refused colp. repeat pap/HPV in  with annual exam     ASCUS with positive high risk HPV 13,11/27/15     9/11/12 colp- WNL     Attention deficit disorder without mention of hyperactivity      Calculus of kidney      Cervical high risk HPV (human papillomavirus) test positive 2017     Depressive disorder, not elsewhere classified      Diabetes mellitus with complication (H) 3/22/2010     Essential hypertension, benign      High risk HPV infection 14     normal pap     Migraine headaches 2011     Mild intermittent asthma      Moderate major depression (H) 2010     Other and unspecified hyperlipidemia      Sciatica      Type II or unspecified type diabetes mellitus without mention of complication, not stated as uncontrolled       Past Surgical History   Procedure Laterality Date     Surgical history of -        right knee arthroscopic     Surgical history of -        lump on neck removed     Ent surgery       Orthopedic surgery       Obstetric History       T0      TAB0   SAB0   E0   M0   L0           ROS:  C: NEGATIVE for fever, chills, change in weight  I: NEGATIVE for worrisome rashes, moles or lesions  E: NEGATIVE for vision changes or irritation  ENT: NEGATIVE for ear, mouth and throat problems  R: NEGATIVE for significant cough or SOB  B: NEGATIVE for masses, tenderness or discharge  CV: NEGATIVE for chest pain, palpitations or peripheral edema  GI: NEGATIVE for nausea, abdominal pain, heartburn, or change in bowel habits  : NEGATIVE for unusual urinary or vaginal       Nose: Nose normal.      Mouth/Throat:      Mouth: Mucous membranes are moist.      Pharynx: Oropharynx is clear. Uvula midline.   Eyes:      General: Lids are normal.      Extraocular Movements: Extraocular movements intact.      Pupils: Pupils are equal, round, and reactive to light.   Neck:      Thyroid: No thyromegaly or thyroid tenderness.   Cardiovascular:      Rate and Rhythm: Normal rate and regular rhythm.      Heart sounds: Normal heart sounds.   Pulmonary:      Effort: Pulmonary effort is normal.      Breath sounds: Normal breath sounds.   Abdominal:      General: Bowel sounds are normal.      Palpations: Abdomen is soft.      Tenderness: There is no abdominal tenderness. There is no guarding.   Musculoskeletal:         General: No swelling. Normal range of motion.      Cervical back: Normal range of motion.      Right lower leg: No edema.      Left lower leg: No edema.   Lymphadenopathy:      Head:      Right side of head: No submental, submandibular or tonsillar adenopathy.      Left side of head: No submental, submandibular or tonsillar adenopathy.      Cervical: No cervical adenopathy.   Skin:     General: Skin is warm and dry.      Capillary Refill: Capillary refill takes less than 2 seconds.      Coloration: Skin is not jaundiced.      Findings: No lesion or rash.   Neurological:      General: No focal deficit present.      Mental Status: She is alert and oriented to person, place, and time.   Psychiatric:         Mood and Affect: Mood normal.         Behavior: Behavior normal.         Thought Content: Thought content normal.         Judgment: Judgment normal.         Assessment & Plan  Urinary incontinence, unspecified type    Orders:    tolterodine LA (Detrol LA) 4 mg 24 hr capsule; Take 1 capsule (4 mg) by mouth once daily.    Routine general medical examination at health care facility    Orders:    1 Year Follow Up In Primary Care - Wellness Exam; Future    Comprehensive Metabolic Panel;  symptoms. Periods are regular.  M: NEGATIVE for significant arthralgias or myalgia  N: NEGATIVE for weakness, dizziness or paresthesias  P: NEGATIVE for changes in mood or affect    Problem list, Medication list, Allergies, and Medical/Social/Surgical histories reviewed in [facility] and updated as appropriate.  OBJECTIVE:     LMP 06/30/2016  EXAM:  GENERAL: healthy, alert and no distress  EYES: Eyes grossly normal to inspection, PERRL and conjunctivae and sclerae normal  HENT: ear canals and TM's normal, nose and mouth without ulcers or lesions  NECK: no adenopathy, no asymmetry, masses, or scars and thyroid normal to palpation  RESP: lungs clear to auscultation - no rales, rhonchi or wheezes  BREAST: normal without masses, tenderness or nipple discharge and no palpable axillary masses or adenopathy  CV: regular rate and rhythm, normal S1 S2, no S3 or S4, no murmur, click or rub, no peripheral edema and peripheral pulses strong  ABDOMEN: soft, nontender, no hepatosplenomegaly, no masses and bowel sounds normal   (female): normal female external genitalia, normal urethral meatus, vaginal mucosa pink, moist, well rugated, and normal cervix/adnexa/uterus without masses or discharge  MS: no gross musculoskeletal defects noted, no edema  SKIN: no suspicious lesions or rashes  NEURO: Normal strength and tone, mentation intact and speech normal  PSYCH: mentation appears normal, affect normal/bright    ASSESSMENT/PLAN:         ICD-10-CM    1. Encounter for routine adult health examination with abnormal findings Z00.01    2. Uncontrolled type 2 diabetes mellitus with complication, with long-term current use of insulin (H) E11.8 BASIC METABOLIC PANEL    E11.65 Lipid panel reflex to direct LDL    Z79.4    3. Screening for malignant neoplasm of cervix Z12.4 HPV High Risk Types DNA Cervical   4. Screening for diabetic retinopathy Z13.5 OPHTHALMOLOGY ADULT REFERRAL     CANCELED: Microalbumin quantitative, random urine   5. Screening  "for diabetic peripheral neuropathy Z13.89 FOOT EXAM  NO CHARGE [09659.114]   6. Hyperlipidemia LDL goal <100 E78.5    7. Hypothyroidism, unspecified type E03.9 TSH   8. Major depressive disorder, recurrent episode, moderate (H) F33.1    9. Intermittent asthma, with acute exacerbation J45.21    10. Cervical polyp N84.1    11. Chronic nonintractable headache, unspecified headache type R51 HYDROcodone-acetaminophen (NORCO) 5-325 MG per tablet   12. Sciatica, unspecified laterality M54.30 HYDROcodone-acetaminophen (NORCO) 5-325 MG per tablet   13. Papanicolaou smear of cervix with atypical squamous cells of undetermined significance (ASC-US) R87.610 Pap imaged thin layer diagnostic with HPV (select HPV order below)   14. Screen for STD (sexually transmitted disease) Z11.3 Neisseria gonorrhoeae PCR     Chlamydia trachomatis PCR     DIABETES MELLITUS-following endocrine  History of abnormal pap-pap today  Cervical polyp, history of vaginal bleeding -stopped now, advised gyn referral, declined ultrasound, previously ordered.   History of headache-stable, prn sparingly  Asthma-stable, cont inhaler  COUNSELING:  Reviewed preventive health counseling, as reflected in patient instructions       Regular exercise       Healthy diet/nutrition       Vision screening         reports that she has never smoked. She has never used smokeless tobacco.    Estimated body mass index is 40.22 kg/(m^2) as calculated from the following:    Height as of 9/30/16: 5' 6.5\" (1.689 m).    Weight as of 2/2/17: 253 lb (114.8 kg).   Weight management plan: Discussed healthy diet and exercise guidelines and patient will follow up in 3 months in clinic to re-evaluate.       Counseling Resources:  ATP IV Guidelines  Pooled Cohorts Equation Calculator  Breast Cancer Risk Calculator  FRAX Risk Assessment  ICSI Preventive Guidelines  Dietary Guidelines for Americans, 2010  USDA's MyPlate  ASA Prophylaxis  Lung CA Screening    Graciela Gates, " Future    Encounter for screening mammogram for breast cancer    Orders:    BI mammo bilateral screening tomosynthesis; Future    Screening for hyperlipidemia    Orders:    Lipid Panel; Future    Vitamin D deficiency    Orders:    Vitamin D 25-Hydroxy,Total (for eval of Vitamin D levels); Future    Wellness examination              Amanda was seen today for medicare annual wellness visit subsequent.  Diagnoses and all orders for this visit:  Routine general medical examination at health care facility  -     1 Year Follow Up In Primary Care - Wellness Exam; Future  -     Comprehensive Metabolic Panel; Future  -     Comprehensive Metabolic Panel  Urinary incontinence, unspecified type  -     tolterodine LA (Detrol LA) 4 mg 24 hr capsule; Take 1 capsule (4 mg) by mouth once daily.  Encounter for screening mammogram for breast cancer  -     BI mammo bilateral screening tomosynthesis; Future  Screening for hyperlipidemia  -     Lipid Panel; Future  -     Lipid Panel  Vitamin D deficiency  -     Vitamin D 25-Hydroxy,Total (for eval of Vitamin D levels); Future  -     Vitamin D 25-Hydroxy,Total (for eval of Vitamin D levels)  Wellness examination     DO  [facility]

## 2025-04-23 NOTE — NURSING NOTE
"Chief Complaint   Patient presents with     Diabetes     Headache     Pain     arm pain       Initial /87 (BP Location: Right arm, Patient Position: Chair, Cuff Size: Adult Large)  Pulse 83  Temp 97.7  F (36.5  C) (Oral)  Wt 254 lb (115.2 kg)  LMP 06/30/2016  SpO2 95%  Breastfeeding? No  BMI 40.08 kg/m2 Estimated body mass index is 40.08 kg/(m^2) as calculated from the following:    Height as of 7/21/17: 5' 6.75\" (1.695 m).    Weight as of this encounter: 254 lb (115.2 kg).  Medication Reconciliation: complete.  ANITRA Terry MA        "
7

## 2025-06-26 ENCOUNTER — MEDICAL CORRESPONDENCE (OUTPATIENT)
Dept: HEALTH INFORMATION MANAGEMENT | Facility: CLINIC | Age: 55
End: 2025-06-26

## 2025-07-01 ENCOUNTER — TRANSCRIBE ORDERS (OUTPATIENT)
Dept: OTHER | Age: 55
End: 2025-07-01

## 2025-07-01 DIAGNOSIS — R91.1 SOLITARY PULMONARY NODULE: Primary | ICD-10-CM

## 2025-07-03 ENCOUNTER — PATIENT OUTREACH (OUTPATIENT)
Dept: ONCOLOGY | Facility: CLINIC | Age: 55
End: 2025-07-03
Payer: COMMERCIAL

## 2025-07-03 ENCOUNTER — TRANSCRIBE ORDERS (OUTPATIENT)
Dept: OTHER | Age: 55
End: 2025-07-03

## 2025-07-03 DIAGNOSIS — R91.8 PULMONARY NODULES: Primary | ICD-10-CM

## 2025-07-03 DIAGNOSIS — R91.1 SOLITARY PULMONARY NODULE: Primary | ICD-10-CM

## 2025-07-03 NOTE — PROGRESS NOTES
New Patient Thoracic Surgery Nurse Navigator Note     Referring provider: CHIDI External Fax Details Provider: TOBI FU Affiliated with: [facility] Clinic Location: [address] [facility] [address] Phone number: [phone] records received with referral? Yes, Records sent to HIM with referral    Referred to (specialty): Thoracic Surgery    Requested provider (if applicable): n/a    Date Referral Received: 7/3/2025    Evaluation for:  lung nodule  ~noted in office notes.  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  Needs deep sedation and resection of nodule~    Clinical History (per Nurse review of records provided):    **BOOK MARKED**    [facility]  CT CHEST ROUTINE WITHOUT IV CONTRAST     INDICATION:   Lung nodule follow up,Solitary pulmonary nodule     TECHNIQUE:   Sequential axial images are obtained from the thoracic inlet through the   adrenal glands without contrast. Coronal reconstructions obtained.         While obtaining CT images, dose reduction techniques were utilized including:     Automated exposure control, adjustment of mA and/or kV according to patient   size, and/or use of iterative reconstruction technique     RADIATION DOSE:   54 DLP (mGy cm)     COMPARISON:   January 13, 2025     FINDINGS:   Heart and Pericardium:  Cardiomegaly.  Three-vessel coronary artery   calcifications.  Trace pericardial effusion.     Mediastinum and Nancy:  No masses or adenopathy.     Lungs and Large Airways:  Nodule in the right lower lobe measures 1.5 x 1.4 cm   (series 6/194), previously 1.2 x 1.1 cm.  6 mm nodule in the right middle lobe   (series 6/142) is unchanged as well as few other scattered micronodules.  No   new nodules visualized.  Focal consolidation.     Pleura:  No pleural effusion or thickening.     Chest Wall and Lower Neck:  No mass or adenopathy.     Vessels:  Thoracic aorta and arch vessels are normal in size. Pulmonary vessels   appear normal.      Bones:  No aggressive osseous lesions.     Upper Abdomen: Diffusely decreased hepatic attenuation consistent with   steatosis.  Colonic diverticulosis.     IMPRESSION:   1. Progressive increase in size of right lower lobe pulmonary nodule.  This   remains suspicious for malignancy.   2. Multiple other scattered pulmonary micronodules are unchanged.   3.  Hepatic steatosis. Correlate clinically as can predispose patient to   steatohepatitis, eventual cirrhosis and hepatocellular carcinoma.  Exam End: 06/23/25 10:16 AM      Records Location: [facility]     RECORDS NEEDED:  Last FIVE years CHEST imaging pushed to PACS from [facility]--thank you!!    Additional testing needed prior to consult: PFT's

## 2025-07-05 ENCOUNTER — HEALTH MAINTENANCE LETTER (OUTPATIENT)
Age: 55
End: 2025-07-05

## 2025-07-09 NOTE — PROGRESS NOTES
THORACIC SURGERY - NEW PATIENT OFFICE VISIT      Dear Dr. Tj Murphy,    I saw Coty Myles at Dr. Murphy s request in consultation for the evaluation and treatment of an enlarging pulmonary nodule.     HISTORY OF PRESENT ILLNESS  Coty Myles is a 55 year old female with a history of diabetes mellitus and mild intermittent asthma who has been following with Dr. Tj Murphy in pulmonary clinic for a known RLL pulmonary nodule, which had initially been identified on CT abdomen/pelvis done on 3/10/2022 for lower abdominal pain and nausea; at the time, it was 8mm in the basilar RLL. On a coronary artery CT done 6/21/2024, this nodule had been unchanged in size and configuration. Most recently, Coty underwent a CT chest PE for shortness of breath on 12/26/2024, and it was found that this RLL nodule had increased in size to 1.2x1.1cm. She was subsequently urgently referred to pulmonary due to concern for malignancy.  Coty established care with Dr. Tj Murphy in pulmonary clinic 1/23/2025. A CT needle biopsy was initially recommended, but Coty had deep reservations about having it done because of severe anxiety associated with thinking about the procedure, and she is unable to lay prone for prolonged periods. At that visit, with shared decisionmaking, the plan was made that the patient would undergo a PET scan for further risk stratification. If the RLL nodule was PET avid, then Coty would see us in thoracic surgery clinic for consideration of curative resection. If it was not PET avid, then she would have elected to pursue watchful waiting. She underwent PET on 2/6/2025, which showed only low-level FDG avidity. Unfortunately, short-interval CT chest done 4 months later on 6/24/2025 showed that the RLL pulmonary nodule had grown even further to 1.5 x 1.4cm, and so Coty was referred to thoracic surgery to discuss operative options.   Coty has been working full time and in her usual state of health; while she  does carry a diagnosis of mild intermittent asthma, she typically does just fine in terms of her breathing. She's able to walk >0.5 miles and is limited by hip pain, not shortness of breath. She's able to walk up and down the stairs multiple times a day, as her bedroom is on the second floor of her home.  PREVISIT TESTS  1/13/2025 CT Chest PE  EXAM:   CT CHEST PULMONARY NODULE FU WITHOUT CONTRAST     INDICATION:   Shortness of breath.     TECHNIQUE:   Multiple CT images of the chest were performed without intravenous contrast   using standard protocol.         While obtaining CT images, dose reduction techniques were utilized including:     Automated exposure control, adjustment of mA and/or kV according to patient   size, and/or use of iterative reconstruction technique     RADIATION DOSE:   54 DLP (mGy cm)     COMPARISON:   CT 12/17/2024, abdomen CT 03/10/2022     FINDINGS:   Heart and vasculature: Cardiomegaly and coronary artery calcifications are   unchanged.  No pericardial effusion.  Normal aortic size.     Lungs: Right lower lobe nodule measuring 1.2 x 1.1 cm on series 3, image 100   increased in size since 2022 that measured 8 mm in maximum diameter.  This   nodule unchanged size and configuration from recent cardiology chest CT   performed on 12/17/2024.  Increasing size of concerning for malignancy until   proven otherwise.  Pulmonary consultation recommended for further workup   purposes.     No consolidation.  No pulmonary edema.     Airway: Unremarkable.     Pleura: No pleural effusions.     Lymph nodes: No lymphadenopathy.     Thyroid and esophagus: Negative thyroid.Negative esophagus.     Upper abdomen: Lack of IV contrast limits evaluation of solid organs.  Sigmoid   diverticulosis partially imaged, unchanged.     Osseous structures: No suspicious osseous lesions.     CODE 3:  This report contains unexpected but non-urgent abnormality that   requires clinical follow-up or additional imaging.      CODE 44: Indeterminate Pulmonary Nodule/Mass Identified.     IMPRESSION:   1. Increased size right lower lobe nodule when compared to 2022, malignancy of   concern until proven otherwise given the interval change.  Nodule is unchanged   in size and appearance from recent cardiology chest CT.  CODE 55:  Pulmonary   consultation recommended to assess tissue diagnosis options, any additional   workup and management options for the abnormal lung finding(s).    2/6/2025 PET Scan  EXAM: PET/CT EYES TO THIGHS, CANCER DIAGNOSIS     CLINICAL INFORMATION:  Solitary pulmonary nodule     TECHNICAL INFORMATION:  Helical acquisition of data was obtained from the orbits   to the upper thighs with reconstruction of 3.75 mm thick images at 3.75 mm   intervals.  The CT data was used for attenuation correction.  PET scanning was   performed through the same anatomic range 60 minutes following administration of   10 mCi of 18-FDG delivered intravenously.  The patient's glucose at the time of   the injection was 100 mg/dL.  PET, CT and PET/CT fusion images are interpreted   using a computer viewing workstation.  PET, CT and PET/CT fusion images were   archived and saved in the patient's permanent medical record.     COMPARISON:  CT chest 1/13/2025     INTERPRETATION:     Mediastinal blood pool activity: 2.9   Background liver parenchymal uptake: 3.23.     Head and Neck:      Diffuse bilateral uptake in the thyroid gland with an SUV max of 6.72.  No   discrete nodule is visualized.     Focal uptake which appears to be in the region of the left masseter with an SUV   max of 6.23.  No discrete CT correlate lesion.     There is physiologic uptake in the intracranial soft tissues.     Chest:      Redemonstrated nodule in the right lower lobe which appears similar in size to   the prior exam measuring up to 1.1 cm (series 4 image 92). There is minimal FDG   avidity which is less than the mediastinal blood pool activity and background    liver with an SUV max of 2.86.     A few additional other stable lung nodules.  These nodules are mostly too small   to be adequately characterized on this exam but do not demonstrate significant   FDG avidity.  A few representative nodules are described below:   A 6 mm in the right middle lobe (series 4 image 78).   A 4 mm in the left lower lobe (series 4 image 68).   A 4 mm in the left lower lobe (series 4 image 72).   A 5 mm in the left lower lobe (series 4 image 76).     A focus of uptake in the mid esophagus with an SUV max of 6.04.  No discrete CT   correlate lesion.     No hypermetabolic lymphadenopathy.     Cardiomegaly.  Coronary artery atherosclerosis.  Atherosclerosis of the thoracic   aorta.       Abdomen and Pelvis:      There are no abnormal hypermetabolic foci within the abdomen or pelvis.  There   is physiologic excretion of radiotracer in the urine and bowel.     Diffuse hypoattenuation of the liver.  Aortoiliac atherosclerosis.  Colonic   diverticulosis.  Mild to moderate stool burden.  Normal appendix.     Skeleton, Musculature, and Integument:  No abnormal hypermetabolic foci within   the skeleton.  No madeline osteoblastic or osteolytic disease.     CONCLUSION:     1. A 1.1 cm nodule in the right lower lobe with minimal low-level FDG avidity.   The nodule appears similar in size to the recent comparison CT chest but has   reportedly increased in size from more remote exams and remains indeterminate.   Recommend short interval follow-up CT chest in 3-6 months.   2. Several additional scattered lung nodules measuring up to 6 mm which are too   small to resolve on PET/CT.  Attention on follow-up imaging.   3. Focal mild uptake in the region of the mid esophagus.  No discrete CT   correlate lesion is visualized.  Endoscopy could be considered to exclude   underlying neoplasm.  If not performed, attention on follow-up CT chest with IV   contrast.   4. Diffuse uptake in the thyroid gland without  discrete nodule.  Correlate with   thyroid function tests.   5. A focus of uptake in the left facial bone region which appears to be   associated with the left masseter.  There is no discrete CT correlate lesion.   Given the asymmetry and degree of uptake, consider CT of the neck with IV   contrast to exclude an underlying lesion.   Read by: Doc Feliciano D.O   Reviewed and Electronically Signed by: Doc Feliciano D.O    6/24/2025 CT Chest w/o Contrast    FINDINGS:   Heart and Pericardium:  Cardiomegaly.  Three-vessel coronary artery   calcifications.  Trace pericardial effusion.     Mediastinum and Nancy:  No masses or adenopathy.     Lungs and Large Airways:  Nodule in the right lower lobe measures 1.5 x 1.4 cm (series 6/194), previously 1.2 x 1.1 cm.  6 mm nodule in the right middle lobe (series 6/142) is unchanged as well as few other scattered micronodules.  No new nodules visualized.  Focal consolidation.     Pleura:  No pleural effusion or thickening.    Chest Wall and Lower Neck:  No mass or adenopathy.     Vessels:  Thoracic aorta and arch vessels are normal in size. Pulmonary vessels   appear normal.     Bones:  No aggressive osseous lesions.     Upper Abdomen: Diffusely decreased hepatic attenuation consistent with   steatosis.  Colonic diverticulosis.   IMPRESSION:   1. Progressive increase in size of right lower lobe pulmonary nodule.  This   remains suspicious for malignancy.   2. Multiple other scattered pulmonary micronodules are unchanged.   3.  Hepatic steatosis. Correlate clinically as can predispose patient to   steatohepatitis, eventual cirrhosis and hepatocellular carcinoma.    7/10/2025 Pulmonary Function Tests       PAST MEDICAL HISTORY  Reviewed, as below    Past Medical History:   Diagnosis Date    Alexia and dyslexia     Allergic rhinitis due to other allergen     ASCUS on Pap smear 9/20/13    neg HPV. refused colp. repeat pap/HPV in 2014 with annual exam    ASCUS with  "positive high risk HPV 8/13/13,11/27/15    9/11/12 colp- WNL    Attention deficit disorder without mention of hyperactivity     Calculus of kidney     Cervical high risk HPV (human papillomavirus) test positive 02/27/2017    Depressive disorder     Depressive disorder, not elsewhere classified     Diabetes mellitus with complication (H) 3/22/2010    Essential hypertension, benign     Headache     Headache     High risk HPV infection 11/7/14    normal pap    Migraine headaches 5/11/2011    Mild intermittent asthma     Moderate major depression (H) 11/22/2010    Other and unspecified hyperlipidemia     Sciatica     Thyroid disease     Type II or unspecified type diabetes mellitus without mention of complication, not stated as uncontrolled         PAST SURGICAL HISTORY  Reviewed, as below    Past Surgical History:   Procedure Laterality Date    BIOPSY      ENT SURGERY      ORTHOPEDIC SURGERY      SURGICAL HISTORY OF -   1990    right knee arthroscopic    SURGICAL HISTORY OF -   2000    lump on neck removed      - No history of currently/past tobacco use. Denies vaping or ecigarette use, denies marijuana use. No alcohol. Does report some secondhand smoke exposure as a child, not recently.  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   - Denies occupational lung exposures. No significant asbestos exposure-- a home she lived in in the past did have asbestos pipes, but this was insulated and up to code.   - Has three cats, one dog. No bird exposure.    Physical examination  BMI 40.08    /86 (BP Location: Left arm, Patient Position: Sitting, Cuff Size: Adult Large)   Pulse 98   Temp 97.7  F (36.5  C) (Oral)   Resp 18   Ht 1.682 m (5' 6.22\")   Wt 113.4 kg (250 lb)   LMP 06/30/2016   SpO2 93%   BMI 40.08 kg/m      GENERAL: The patient is awake and alert, in no apparent distress, appropriate, pleasant and cooperative. No dysarthria is noted. No " discomfort on presentation is noted.  HEAD: Atraumatic, normocephalic.   LUNGS: Clear to auscultation bilaterally. No rales, rhonchi or wheezes are appreciated. Good air movement is auscultated in all 4 lung fields.  SKIN: No rashes. No jaundice. Pink and warm with good turgor.   PSYCHIATRIC: The patient is oriented x4. Mood and affect are appropriate.     From a personal perspective, she is here with her  and her sister, Deisy.    Code Status: Full Code    Health Care Proxy:  and sister    IMPRESSION:    This person is a 55 year old female with an enlarging solid RLL pulmonary nodule.     PLAN  I spent 45 min on the date of the encounter in chart review, patient visit, review of tests, documentation and/or discussion with other providers about the issues documented above. I reviewed the plan as follows:    We discussed that given the growth, a resection was reasonable. However, it does not seem to be an aggressive lesion.  She prefers to have it out rather than a needle biopsy or observation.    Procedure planned: Procedure planned: Right robotitc wedge resection, possible lobectomy , with possible  thoracotomy.  I reviewed the indications, risks, and benefits of the procedure with Ms. Coty Myles. We discussed the intraoperative risks of bleeding and injury to vital organs, potential postoperative complications including, but not limited to, major respiratory events, arrhythmia, bleeding, infection, reoperation, and death. I explained the anticipated hospital course (+/- 1-4 days) and postoperative recovery including pain control, chest drain management, and variable degrees of dyspnea (or need for supplemental oxygen) and fatigue that tend to get better with time.    She has significant issues with anxiety. This is why she refused a needle biopsy. SHe is willing for IVs prior to surgery. She will discuss with her PCP and PAC about meds to help with this.     Necessary Preop Tests & Appointments:  Preoperative assessment clinic    Regional Anesthesia Plan: Intraoperative intercostal nerve block    Anticoagulation Plan: Prophylactic Lovenox        I appreciate the opportunity to participate in the care of your patient and will keep you updated.      Sincerely,    Tina Ferro MD

## 2025-07-09 NOTE — TELEPHONE ENCOUNTER
RECORDS STATUS - ALL OTHER DIAGNOSIS      Imaging Received  July 10, 2025 8:55 AM    Action: Images from [facility] received and resolved to PACS.     [facility]: [facility] [place]   NOTES STATUS DETAILS   OFFICE NOTE from referring provider [facility]- [facility] 1/23/25: Dr. Tj Murphy   MEDICATION LIST [facility]- [facility]    LABS     ANYTHING RELATED TO DIAGNOSIS [facility]- [facility] Most recent 3/8/25   IMAGING (NEED IMAGES & REPORT)     CT SCANS (Img req) [facility]:  6/23/25, 1/13/25: CT Chest    [place]:  12/17/24: CTA Chest   XRAYS (Img req) [place]:  9/28/24, 5/15/21: XR Chest    [facility]:  217/20: XR Chest   PET (Img req) [place]:  2/4/25: PET Eyes to Thighs

## 2025-07-10 ENCOUNTER — TELEPHONE (OUTPATIENT)
Dept: SURGERY | Facility: CLINIC | Age: 55
End: 2025-07-10

## 2025-07-10 ENCOUNTER — PATIENT OUTREACH (OUTPATIENT)
Dept: SURGERY | Facility: CLINIC | Age: 55
End: 2025-07-10

## 2025-07-10 ENCOUNTER — ONCOLOGY VISIT (OUTPATIENT)
Dept: SURGERY | Facility: CLINIC | Age: 55
End: 2025-07-10
Attending: INTERNAL MEDICINE
Payer: COMMERCIAL

## 2025-07-10 ENCOUNTER — PRE VISIT (OUTPATIENT)
Dept: SURGERY | Facility: CLINIC | Age: 55
End: 2025-07-10

## 2025-07-10 VITALS
SYSTOLIC BLOOD PRESSURE: 135 MMHG | RESPIRATION RATE: 18 BRPM | HEIGHT: 66 IN | DIASTOLIC BLOOD PRESSURE: 86 MMHG | TEMPERATURE: 97.7 F | WEIGHT: 250 LBS | BODY MASS INDEX: 40.18 KG/M2 | HEART RATE: 98 BPM | OXYGEN SATURATION: 93 %

## 2025-07-10 DIAGNOSIS — R91.1 LUNG NODULE: Primary | ICD-10-CM

## 2025-07-10 DIAGNOSIS — R91.8 PULMONARY NODULES: ICD-10-CM

## 2025-07-10 LAB
DLCOCOR-%PRED-PRE: 82 %
DLCOCOR-PRE: 17.83 ML/MIN/MMHG
DLCOUNC-%PRED-PRE: 84 %
DLCOUNC-PRE: 18.13 ML/MIN/MMHG
DLCOUNC-PRED: 21.52 ML/MIN/MMHG
ERV-%PRED-PRE: 33 %
ERV-PRE: 0.33 L
ERV-PRED: 1.01 L
EXPTIME-PRE: 5.83 SEC
FEF2575-%PRED-POST: 71 %
FEF2575-%PRED-PRE: 32 %
FEF2575-POST: 1.74 L/SEC
FEF2575-PRE: 0.77 L/SEC
FEF2575-PRED: 2.42 L/SEC
FEFMAX-%PRED-PRE: 78 %
FEFMAX-PRE: 5.45 L/SEC
FEFMAX-PRED: 6.9 L/SEC
FEV1-%PRED-PRE: 57 %
FEV1-PRE: 1.57 L
FEV1FEV6-PRE: 70 %
FEV1FEV6-PRED: 81 %
FEV1FVC-PRE: 66 %
FEV1FVC-PRED: 80 %
FEV1SVC-PRE: 68 L
FEV1SVC-PRED: 71 L
FIFMAX-PRE: 3.26 L/SEC
FRCPLETH-PRED: 3.04 L
FVC-%PRED-PRE: 69 %
FVC-PRE: 2.38 L
FVC-PRED: 3.44 L
GAW-PRED: 1.03 L/S/CMH2O
IC-%PRED-PRE: 72 %
IC-PRE: 1.98 L
IC-PRED: 2.75 L
Lab: 83 %
RVPLETH-PRED: 1.85 L
SGAW-PRED: 0.2 1/CMH2O*S
SRAW-PRED: < 4.76 CMH2O*S
TLCPLETH-PRED: 5.69 L
VA-%PRED-PRE: 63 %
VA-PRE: 3.34 L
VC-%PRED-PRE: 60 %
VC-PRE: 2.31 L
VC-PRED: 3.85 L

## 2025-07-10 PROCEDURE — 99213 OFFICE O/P EST LOW 20 MIN: CPT | Performed by: STUDENT IN AN ORGANIZED HEALTH CARE EDUCATION/TRAINING PROGRAM

## 2025-07-10 RX ORDER — ASPIRIN 81 MG/1
81 TABLET, CHEWABLE ORAL DAILY
COMMUNITY

## 2025-07-10 RX ORDER — FOLIC ACID 1 MG/1
2 TABLET ORAL
COMMUNITY
Start: 2024-10-17 | End: 2025-10-17

## 2025-07-10 RX ORDER — MINOXIDIL 2.5 MG/1
2.5 TABLET ORAL
COMMUNITY
Start: 2025-04-14 | End: 2026-04-14

## 2025-07-10 RX ORDER — ATENOLOL 50 MG/1
50 TABLET ORAL EVERY MORNING
COMMUNITY
Start: 2024-08-26 | End: 2025-08-26

## 2025-07-10 RX ORDER — LOSARTAN POTASSIUM AND HYDROCHLOROTHIAZIDE 25; 100 MG/1; MG/1
1 TABLET ORAL EVERY MORNING
COMMUNITY
Start: 2024-08-26 | End: 2025-08-21

## 2025-07-10 RX ORDER — INSULIN LISPRO 100 [IU]/ML
INJECTION, SOLUTION SUBCUTANEOUS
COMMUNITY

## 2025-07-10 RX ORDER — SEMAGLUTIDE 2.68 MG/ML
INJECTION, SOLUTION SUBCUTANEOUS
COMMUNITY

## 2025-07-10 RX ORDER — DULOXETIN HYDROCHLORIDE 60 MG/1
60 CAPSULE, DELAYED RELEASE ORAL DAILY
COMMUNITY

## 2025-07-10 RX ORDER — EZETIMIBE 10 MG/1
10 TABLET ORAL EVERY MORNING
COMMUNITY
Start: 2024-11-27

## 2025-07-10 ASSESSMENT — PAIN SCALES - GENERAL: PAINLEVEL_OUTOF10: NO PAIN (0)

## 2025-07-10 NOTE — LETTER
7/10/2025      Coty Myles  [address] [place] [address]      Dear Colleague,    Thank you for referring your patient, Coty Myles, to the [facility]. Please see a copy of my visit note below.    THORACIC SURGERY - NEW PATIENT OFFICE VISIT      Dear Dr. Tj Murphy,    I saw Coty Myles at Dr. Murphy s request in consultation for the evaluation and treatment of an enlarging pulmonary nodule.     HISTORY OF PRESENT ILLNESS  Coty Myles is a 55 year old female with a history of diabetes mellitus and mild intermittent asthma who has been following with Dr. Tj Murphy in pulmonary clinic for a known RLL pulmonary nodule, which had initially been identified on CT abdomen/pelvis done on 3/10/2022 for lower abdominal pain and nausea; at the time, it was 8mm in the basilar RLL. On a coronary artery CT done 6/21/2024, this nodule had been unchanged in size and configuration. Most recently, Coty underwent a CT chest PE for shortness of breath on 12/26/2024, and it was found that this RLL nodule had increased in size to 1.2x1.1cm. She was subsequently urgently referred to pulmonary due to concern for malignancy.  Coty established care with Dr. Tj Murphy in pulmonary clinic 1/23/2025. A CT needle biopsy was initially recommended, but Coty had deep reservations about having it done because of severe anxiety associated with thinking about the procedure, and she is unable to lay prone for prolonged periods. At that visit, with shared decisionmaking, the plan was made that the patient would undergo a PET scan for further risk stratification. If the RLL nodule was PET avid, then Coty would see us in thoracic surgery clinic for consideration of curative resection. If it was not PET avid, then she would have elected to pursue watchful waiting. She underwent PET on 2/6/2025, which showed only low-level FDG avidity. Unfortunately, short-interval CT chest done 4 months  later on 6/24/2025 showed that the RLL pulmonary nodule had grown even further to 1.5 x 1.4cm, and so Coty was referred to thoracic surgery to discuss operative options.   Coty has been working full time and in her usual state of health; while she does carry a diagnosis of mild intermittent asthma, she typically does just fine in terms of her breathing. She's able to walk >0.5 miles and is limited by hip pain, not shortness of breath. She's able to walk up and down the stairs multiple times a day, as her bedroom is on the second floor of her home.  PREVISIT TESTS  1/13/2025 CT Chest PE  EXAM:   CT CHEST PULMONARY NODULE FU WITHOUT CONTRAST     INDICATION:   Shortness of breath.     TECHNIQUE:   Multiple CT images of the chest were performed without intravenous contrast   using standard protocol.         While obtaining CT images, dose reduction techniques were utilized including:     Automated exposure control, adjustment of mA and/or kV according to patient   size, and/or use of iterative reconstruction technique     RADIATION DOSE:   54 DLP (mGy cm)     COMPARISON:   CT 12/17/2024, abdomen CT 03/10/2022     FINDINGS:   Heart and vasculature: Cardiomegaly and coronary artery calcifications are   unchanged.  No pericardial effusion.  Normal aortic size.     Lungs: Right lower lobe nodule measuring 1.2 x 1.1 cm on series 3, image 100   increased in size since 2022 that measured 8 mm in maximum diameter.  This   nodule unchanged size and configuration from recent cardiology chest CT   performed on 12/17/2024.  Increasing size of concerning for malignancy until   proven otherwise.  Pulmonary consultation recommended for further workup   purposes.     No consolidation.  No pulmonary edema.     Airway: Unremarkable.     Pleura: No pleural effusions.     Lymph nodes: No lymphadenopathy.     Thyroid and esophagus: Negative thyroid.Negative esophagus.     Upper abdomen: Lack of IV contrast limits evaluation of solid  organs.  Sigmoid   diverticulosis partially imaged, unchanged.     Osseous structures: No suspicious osseous lesions.     CODE 3:  This report contains unexpected but non-urgent abnormality that   requires clinical follow-up or additional imaging.     CODE 44: Indeterminate Pulmonary Nodule/Mass Identified.     IMPRESSION:   1. Increased size right lower lobe nodule when compared to 2022, malignancy of   concern until proven otherwise given the interval change.  Nodule is unchanged   in size and appearance from recent cardiology chest CT.  CODE 55:  Pulmonary   consultation recommended to assess tissue diagnosis options, any additional   workup and management options for the abnormal lung finding(s).    2/6/2025 PET Scan  EXAM: PET/CT EYES TO THIGHS, CANCER DIAGNOSIS     CLINICAL INFORMATION:  Solitary pulmonary nodule     TECHNICAL INFORMATION:  Helical acquisition of data was obtained from the orbits   to the upper thighs with reconstruction of 3.75 mm thick images at 3.75 mm   intervals.  The CT data was used for attenuation correction.  PET scanning was   performed through the same anatomic range 60 minutes following administration of   10 mCi of 18-FDG delivered intravenously.  The patient's glucose at the time of   the injection was 100 mg/dL.  PET, CT and PET/CT fusion images are interpreted   using a computer viewing workstation.  PET, CT and PET/CT fusion images were   archived and saved in the patient's permanent medical record.     COMPARISON:  CT chest 1/13/2025     INTERPRETATION:     Mediastinal blood pool activity: 2.9   Background liver parenchymal uptake: 3.23.     Head and Neck:      Diffuse bilateral uptake in the thyroid gland with an SUV max of 6.72.  No   discrete nodule is visualized.     Focal uptake which appears to be in the region of the left masseter with an SUV   max of 6.23.  No discrete CT correlate lesion.     There is physiologic uptake in the intracranial soft tissues.     Chest:       Redemonstrated nodule in the right lower lobe which appears similar in size to   the prior exam measuring up to 1.1 cm (series 4 image 92). There is minimal FDG   avidity which is less than the mediastinal blood pool activity and background   liver with an SUV max of 2.86.     A few additional other stable lung nodules.  These nodules are mostly too small   to be adequately characterized on this exam but do not demonstrate significant   FDG avidity.  A few representative nodules are described below:   A 6 mm in the right middle lobe (series 4 image 78).   A 4 mm in the left lower lobe (series 4 image 68).   A 4 mm in the left lower lobe (series 4 image 72).   A 5 mm in the left lower lobe (series 4 image 76).     A focus of uptake in the mid esophagus with an SUV max of 6.04.  No discrete CT   correlate lesion.     No hypermetabolic lymphadenopathy.     Cardiomegaly.  Coronary artery atherosclerosis.  Atherosclerosis of the thoracic   aorta.       Abdomen and Pelvis:      There are no abnormal hypermetabolic foci within the abdomen or pelvis.  There   is physiologic excretion of radiotracer in the urine and bowel.     Diffuse hypoattenuation of the liver.  Aortoiliac atherosclerosis.  Colonic   diverticulosis.  Mild to moderate stool burden.  Normal appendix.     Skeleton, Musculature, and Integument:  No abnormal hypermetabolic foci within   the skeleton.  No madeline osteoblastic or osteolytic disease.     CONCLUSION:     1. A 1.1 cm nodule in the right lower lobe with minimal low-level FDG avidity.   The nodule appears similar in size to the recent comparison CT chest but has   reportedly increased in size from more remote exams and remains indeterminate.   Recommend short interval follow-up CT chest in 3-6 months.   2. Several additional scattered lung nodules measuring up to 6 mm which are too   small to resolve on PET/CT.  Attention on follow-up imaging.   3. Focal mild uptake in the region of the mid  esophagus.  No discrete CT   correlate lesion is visualized.  Endoscopy could be considered to exclude   underlying neoplasm.  If not performed, attention on follow-up CT chest with IV   contrast.   4. Diffuse uptake in the thyroid gland without discrete nodule.  Correlate with   thyroid function tests.   5. A focus of uptake in the left facial bone region which appears to be   associated with the left masseter.  There is no discrete CT correlate lesion.   Given the asymmetry and degree of uptake, consider CT of the neck with IV   contrast to exclude an underlying lesion.   Read by: Doc Feliciano D.O   Reviewed and Electronically Signed by: Doc Feliciano D.O    6/24/2025 CT Chest w/o Contrast    FINDINGS:   Heart and Pericardium:  Cardiomegaly.  Three-vessel coronary artery   calcifications.  Trace pericardial effusion.     Mediastinum and Nancy:  No masses or adenopathy.     Lungs and Large Airways:  Nodule in the right lower lobe measures 1.5 x 1.4 cm (series 6/194), previously 1.2 x 1.1 cm.  6 mm nodule in the right middle lobe (series 6/142) is unchanged as well as few other scattered micronodules.  No new nodules visualized.  Focal consolidation.     Pleura:  No pleural effusion or thickening.    Chest Wall and Lower Neck:  No mass or adenopathy.     Vessels:  Thoracic aorta and arch vessels are normal in size. Pulmonary vessels   appear normal.     Bones:  No aggressive osseous lesions.     Upper Abdomen: Diffusely decreased hepatic attenuation consistent with   steatosis.  Colonic diverticulosis.   IMPRESSION:   1. Progressive increase in size of right lower lobe pulmonary nodule.  This   remains suspicious for malignancy.   2. Multiple other scattered pulmonary micronodules are unchanged.   3.  Hepatic steatosis. Correlate clinically as can predispose patient to   steatohepatitis, eventual cirrhosis and hepatocellular carcinoma.    7/10/2025 Pulmonary Function Tests       PAST MEDICAL  HISTORY  Reviewed, as below    Past Medical History:   Diagnosis Date     Alexia and dyslexia      Allergic rhinitis due to other allergen      ASCUS on Pap smear 9/20/13    neg HPV. refused colp. repeat pap/HPV in 2014 with annual exam     ASCUS with positive high risk HPV 8/13/13,11/27/15    9/11/12 colp- WNL     Attention deficit disorder without mention of hyperactivity      Calculus of kidney      Cervical high risk HPV (human papillomavirus) test positive 02/27/2017     Depressive disorder      Depressive disorder, not elsewhere classified      Diabetes mellitus with complication (H) 3/22/2010     Essential hypertension, benign      Headache      Headache      High risk HPV infection 11/7/14    normal pap     Migraine headaches 5/11/2011     Mild intermittent asthma      Moderate major depression (H) 11/22/2010     Other and unspecified hyperlipidemia      Sciatica      Thyroid disease      Type II or unspecified type diabetes mellitus without mention of complication, not stated as uncontrolled         PAST SURGICAL HISTORY  Reviewed, as below    Past Surgical History:   Procedure Laterality Date     BIOPSY       ENT SURGERY       ORTHOPEDIC SURGERY       SURGICAL HISTORY OF -   1990    right knee arthroscopic     SURGICAL HISTORY OF -   2000    lump on neck removed      - No history of currently/past tobacco use. Denies vaping or ecigarette use, denies marijuana use. No alcohol. Does report some secondhand smoke exposure as a child, not recently.  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   - Denies occupational lung exposures. No significant asbestos exposure-- a home she lived in in the past did have asbestos pipes, but this was insulated and up to code.   - Has three cats, one dog. No bird exposure.    Physical examination  BMI 40.08    /86 (BP Location: Left arm, Patient Position: Sitting, Cuff Size: Adult Large)   Pulse 98    "Temp 97.7  F (36.5  C) (Oral)   Resp 18   Ht 1.682 m (5' 6.22\")   Wt 113.4 kg (250 lb)   LMP 06/30/2016   SpO2 93%   BMI 40.08 kg/m      GENERAL: The patient is awake and alert, in no apparent distress, appropriate, pleasant and cooperative. No dysarthria is noted. No discomfort on presentation is noted.  HEAD: Atraumatic, normocephalic.   LUNGS: Clear to auscultation bilaterally. No rales, rhonchi or wheezes are appreciated. Good air movement is auscultated in all 4 lung fields.  SKIN: No rashes. No jaundice. Pink and warm with good turgor.   PSYCHIATRIC: The patient is oriented x4. Mood and affect are appropriate.     From a personal perspective, she is here with her  and her sister, Deisy.    Code Status: Full Code    Health Care Proxy:  and sister    IMPRESSION:    This person is a 55 year old female with an enlarging solid RLL pulmonary nodule.     PLAN  I spent 45 min on the date of the encounter in chart review, patient visit, review of tests, documentation and/or discussion with other providers about the issues documented above. I reviewed the plan as follows:    We discussed that given the growth, a resection was reasonable. However, it does not seem to be an aggressive lesion.  She prefers to have it out rather than a needle biopsy or observation.    Procedure planned: Procedure planned: Right robotitc wedge resection, possible lobectomy , with possible  thoracotomy.  I reviewed the indications, risks, and benefits of the procedure with Ms. Coty Myles. We discussed the intraoperative risks of bleeding and injury to vital organs, potential postoperative complications including, but not limited to, major respiratory events, arrhythmia, bleeding, infection, reoperation, and death. I explained the anticipated hospital course (+/- 1-4 days) and postoperative recovery including pain control, chest drain management, and variable degrees of dyspnea (or need for supplemental oxygen) and " fatigue that tend to get better with time.        Necessary Preop Tests & Appointments: Preoperative assessment clinic    Regional Anesthesia Plan: Intraoperative intercostal nerve block    Anticoagulation Plan: Prophylactic Lovenox        I appreciate the opportunity to participate in the care of your patient and will keep you updated.      Sincerely,    Tina Ferro MD               Again, thank you for allowing me to participate in the care of your patient.        Sincerely,        Tina Ferro MD    Electronically signed

## 2025-07-10 NOTE — NURSING NOTE
"Oncology Rooming Note    July 10, 2025 8:59 AM   Coty Myles is a 55 year old female who presents for:    Chief Complaint   Patient presents with    Oncology Clinic Visit     Solitary pulmonary nodule     Initial Vitals: /86 (BP Location: Left arm, Patient Position: Sitting, Cuff Size: Adult Large)   Pulse 98   Temp 97.7  F (36.5  C) (Oral)   Resp 18   Ht 1.682 m (5' 6.22\")   Wt 113.4 kg (250 lb)   LMP 06/30/2016   SpO2 93%   BMI 40.08 kg/m   Estimated body mass index is 40.08 kg/m  as calculated from the following:    Height as of this encounter: 1.682 m (5' 6.22\").    Weight as of this encounter: 113.4 kg (250 lb). Body surface area is 2.3 meters squared.  No Pain (0) Comment: Data Unavailable   Patient's last menstrual period was 06/30/2016.  Allergies reviewed: Yes  Medications reviewed: Yes    Medications: Medication refills not needed today.  Pharmacy name entered into EPIC:    [id number] - [place], [place] - [address] [place] [address] [facility] - [place], [place] - [facility] [address]    Frailty Screening:   Is the patient here for a new oncology consult visit in cancer care? 1. Yes. Over the past month, have you experienced difficulty or required a caregiver to assist with:   1. Balance, walking or general mobility (including any falls)? YES, patient states her vision has been foggy which makes walking difficult.  2. Completion of self-care tasks such as bathing, dressing, toileting, grooming/hygiene?  NO  3. Concentration or memory that affects your daily life?  YES, patient has ADHD.    PHQ9:  Did this patient require a PHQ9?: No      Clinical concerns: none.       Linda Mcneil"

## 2025-07-10 NOTE — TELEPHONE ENCOUNTER
Called pt to offer 7/16 surgery date with Dr. Ferro and got no answer. Left voicemail message with direct call back number [phone].    Doris Angulo on 7/10/2025 at 1:53 PM

## 2025-07-10 NOTE — PROGRESS NOTES
Met with pt in clinic following visit with Dr. Ferro. Introduced self and role of RNCC.     Handouts given: Thoracic surgery direct contact information and lobectomy education booklet    Discussed plan of care including: Right lower lobe wedge/possible lobectomy, to be scheduled    RNCC will outreach again: PRN    Pt is aware of need for PAC visit within 30 days prior to surgery (must be done at [facility] per Dr. Ferro, not with PCP) and to avoid alcohol for 2 weeks prior to surgery.     Pt in agreement and had no further questions or concerns.    Victoria Bell RN BSN  Thoracic Surgery RN Care Coordinator  [phone]

## 2025-07-21 NOTE — TELEPHONE ENCOUNTER
FUTURE VISIT INFORMATION      SURGERY INFORMATION:  Date: 25   Location: [facility] OR  Surgeon:  Tina Ferro MD   Anesthesia Type:  General   Procedure: LOBECTOMY, LUNG, ROBOT-ASSISTED, possible segmentectomy, possible wedge resection   Consult: 7/10/25    RECORDS REQUESTED FROM:       Primary Care Provider: Sherrie Danielle MD    Pertinent Medical History: Allergies: Trazodone, Meperidine And Related, Percocet [Oxycodone-acetaminophen], Statins [Statins], Prednisone   Type II or unspecified type diabetes mellitus without mention of complication, not stated as uncontrolled, Essential hypertension, benign, Mild intermittent asthma, Other and unspecified hyperlipidemia, Thyroid disease, Morbid obesity    Most recent EKG+ Tracin24 @ [facility]    Most recent ECHO: 24    Most recent Coronary Angiogram: 7/3/25     Most recent PFT's: 7/10/25    Records Requested   2025 9:09 AM   [id number]   Facility  [facility]   Outcome 9:11 am Sent request for EKG tracing. BILLY

## 2025-07-22 DIAGNOSIS — R91.1 LUNG NODULE: Primary | ICD-10-CM

## 2025-07-23 DIAGNOSIS — R91.1 LUNG NODULE: Primary | ICD-10-CM

## 2025-07-28 LAB
ABO + RH BLD: NORMAL
BLD GP AB SCN SERPL QL: NEGATIVE
SPECIMEN EXP DATE BLD: NORMAL

## 2025-07-29 ENCOUNTER — OFFICE VISIT (OUTPATIENT)
Dept: SURGERY | Facility: CLINIC | Age: 55
End: 2025-07-29
Payer: COMMERCIAL

## 2025-07-29 ENCOUNTER — HOSPITAL ENCOUNTER (OUTPATIENT)
Dept: CT IMAGING | Facility: CLINIC | Age: 55
Discharge: HOME | End: 2025-07-29
Attending: CLINICAL NURSE SPECIALIST
Payer: COMMERCIAL

## 2025-07-29 ENCOUNTER — ANESTHESIA EVENT (OUTPATIENT)
Dept: SURGERY | Facility: CLINIC | Age: 55
End: 2025-07-29
Payer: COMMERCIAL

## 2025-07-29 ENCOUNTER — LAB (OUTPATIENT)
Dept: LAB | Facility: CLINIC | Age: 55
End: 2025-07-29
Payer: COMMERCIAL

## 2025-07-29 VITALS
OXYGEN SATURATION: 95 % | RESPIRATION RATE: 16 BRPM | TEMPERATURE: 97.5 F | SYSTOLIC BLOOD PRESSURE: 126 MMHG | BODY MASS INDEX: 40.39 KG/M2 | WEIGHT: 251.3 LBS | HEART RATE: 116 BPM | DIASTOLIC BLOOD PRESSURE: 88 MMHG | HEIGHT: 66 IN

## 2025-07-29 DIAGNOSIS — R91.1 LUNG NODULE: ICD-10-CM

## 2025-07-29 DIAGNOSIS — Z01.818 PRE-OP EVALUATION: ICD-10-CM

## 2025-07-29 DIAGNOSIS — Z01.818 PRE-OP EVALUATION: Primary | ICD-10-CM

## 2025-07-29 LAB
ALBUMIN SERPL BCG-MCNC: 4.2 G/DL (ref 3.5–5.2)
ALP SERPL-CCNC: 76 U/L (ref 40–150)
ALT SERPL W P-5'-P-CCNC: 47 U/L (ref 0–50)
ANION GAP SERPL CALCULATED.3IONS-SCNC: 15 MMOL/L (ref 7–15)
AST SERPL W P-5'-P-CCNC: 43 U/L (ref 0–45)
BILIRUB SERPL-MCNC: 0.4 MG/DL
BUN SERPL-MCNC: 17.6 MG/DL (ref 6–20)
CALCIUM SERPL-MCNC: 9.6 MG/DL (ref 8.8–10.4)
CHLORIDE SERPL-SCNC: 99 MMOL/L (ref 98–107)
CREAT SERPL-MCNC: 1.06 MG/DL (ref 0.51–0.95)
EGFRCR SERPLBLD CKD-EPI 2021: 61 ML/MIN/1.73M2
ERYTHROCYTE [DISTWIDTH] IN BLOOD BY AUTOMATED COUNT: 13.6 % (ref 10–15)
EST. AVERAGE GLUCOSE BLD GHB EST-MCNC: 194 MG/DL
GLUCOSE SERPL-MCNC: 138 MG/DL (ref 70–99)
HBA1C MFR BLD: 8.4 %
HCO3 SERPL-SCNC: 23 MMOL/L (ref 22–29)
HCT VFR BLD AUTO: 41.9 % (ref 35–47)
HGB BLD-MCNC: 13.8 G/DL (ref 11.7–15.7)
MCH RBC QN AUTO: 28.9 PG (ref 26.5–33)
MCHC RBC AUTO-ENTMCNC: 32.9 G/DL (ref 31.5–36.5)
MCV RBC AUTO: 88 FL (ref 78–100)
PLATELET # BLD AUTO: 305 10E3/UL (ref 150–450)
POTASSIUM SERPL-SCNC: 4 MMOL/L (ref 3.4–5.3)
PROT SERPL-MCNC: 7.4 G/DL (ref 6.4–8.3)
RBC # BLD AUTO: 4.77 10E6/UL (ref 3.8–5.2)
SODIUM SERPL-SCNC: 137 MMOL/L (ref 135–145)
WBC # BLD AUTO: 10.8 10E3/UL (ref 4–11)

## 2025-07-29 PROCEDURE — 71260 CT THORAX DX C+: CPT | Mod: 26 | Performed by: RADIOLOGY

## 2025-07-29 PROCEDURE — 85027 COMPLETE CBC AUTOMATED: CPT | Performed by: PATHOLOGY

## 2025-07-29 PROCEDURE — 36415 COLL VENOUS BLD VENIPUNCTURE: CPT | Performed by: PATHOLOGY

## 2025-07-29 PROCEDURE — 83036 HEMOGLOBIN GLYCOSYLATED A1C: CPT | Performed by: NURSE PRACTITIONER

## 2025-07-29 PROCEDURE — 86850 RBC ANTIBODY SCREEN: CPT

## 2025-07-29 PROCEDURE — 99000 SPECIMEN HANDLING OFFICE-LAB: CPT | Performed by: PATHOLOGY

## 2025-07-29 PROCEDURE — 71260 CT THORAX DX C+: CPT

## 2025-07-29 PROCEDURE — 250N000011 HC RX IP 250 OP 636: Performed by: CLINICAL NURSE SPECIALIST

## 2025-07-29 PROCEDURE — 99204 OFFICE O/P NEW MOD 45 MIN: CPT | Performed by: NURSE PRACTITIONER

## 2025-07-29 PROCEDURE — 80053 COMPREHEN METABOLIC PANEL: CPT | Performed by: PATHOLOGY

## 2025-07-29 RX ORDER — INSULIN HUMAN 100 [IU]/ML
INJECTION, SUSPENSION SUBCUTANEOUS 2 TIMES DAILY WITH MEALS
COMMUNITY
Start: 2025-03-18 | End: 2026-03-18

## 2025-07-29 RX ORDER — DULOXETIN HYDROCHLORIDE 30 MG/1
30 CAPSULE, DELAYED RELEASE ORAL EVERY MORNING
COMMUNITY

## 2025-07-29 RX ORDER — IOPAMIDOL 755 MG/ML
100 INJECTION, SOLUTION INTRAVASCULAR ONCE
Status: COMPLETED | OUTPATIENT
Start: 2025-07-29 | End: 2025-07-29

## 2025-07-29 RX ORDER — LEVOTHYROXINE SODIUM 137 UG/1
137 TABLET ORAL EVERY MORNING
COMMUNITY
Start: 2025-07-17

## 2025-07-29 RX ADMIN — IOPAMIDOL 100 ML: 755 INJECTION, SOLUTION INTRAVENOUS at 09:01

## 2025-07-29 ASSESSMENT — ENCOUNTER SYMPTOMS: ORTHOPNEA: 0

## 2025-07-29 ASSESSMENT — LIFESTYLE VARIABLES: TOBACCO_USE: 0

## 2025-07-29 ASSESSMENT — PAIN SCALES - GENERAL: PAINLEVEL_OUTOF10: NO PAIN (0)

## 2025-07-29 NOTE — PATIENT INSTRUCTIONS
Preparing for Your Surgery      Name:  Coty Myles   MRN:  [id number]   :  1970   Today's Date:  2025     The [place] [employer] [address] Construction Project                                Timeline 2025 -2025    This project will affect travel to the [facility] and [facility], as well as the [facility].      Please check the [facility] [address] project website for the most up to date information and give yourself additional time to reach your destination.        Arriving for surgery:  Surgery date:  25  Arrival time:  5:30 am  Surgery time: 7:30 am    Please come to:     Please come to:       [facility]    [address] [place]  [address]     The [facility] [place] Patient/Visitor Ramp is at [address]. Patients and visitors who self-park will receive the reduced hospital parking rate. If the Patient /Visitor Ramp is full, please follow the signs to the [employer] car park located at the [facility] entrance.       parking is available (24 hours/ 7 days a week)      Discounted parking pass options are available for patients and visitors. They can be purchased at the [employer] desk at the [facility] entrance.     -    Stop at the security desk and they will direct surgery patients to the Surgery Check in and Family Lounge. [phone]        - If you need directions, a wheelchair or an escort please stop at the Information/security desk in the lobby.       Enhanced Recovery After Surgery - start 1&2 today, start 3 the day before surgery     This is a team effort, including you, to get you back on your feet, eating and drinking normally and out of the hospital as quickly as possible.  The goals are: 1) NO INFECTIONS and   2) RETURN TO NORMAL DIET    How can we achieve these goals?  1) STAY ACTIVE: Walk  every day before your surgery; try to increase the amount every day.  Walk after surgery as much as you can-the nurses will help you.  Walking speeds healing and gets you home quicker, you heal better at home and have less risk of infection.     2) INCENTIVE SPIROMETER: Practice your incentive spirometer 4 times per day with 5 repetitions each time.  Using the incentive spirometer can strengthen your muscles between your ribs and help you have a strong cough after surgery.  A more effective cough can help prevent problems with your lungs.    3) STAY HYDRATED: Drink clear liquids up until 2 hours prior to arrival. We would like you to purchase a drink such as Gatorade or Ensure Clear (not the milkshake type).  Drink this the evening before surgery; drink between 8-10 ounces or until you feel hydrated.  Keeping well hydrated leads to your veins being plump, you wake up faster, and you are less likely to be nauseated. Start drinking water as soon as you can after surgery and advance to clear liquids and food as tolerated.  IV fluids contain salt, drinking fluids will minimize the amount of IV fluids you need and decrease the amount of salt you get.    The most common reason for the patient to be readmitted is dehydration. Staying hydrated after you go home from the hospital is very important.  Ensure or Ensure Clear are good options to keep you hydrated.     4) PAIN MANAGEMENT: If we minimize the amount of opioids and narcotics, and use regional blocks (which numb the area where your surgery is) along with oral pain medications; you will have less side effects of nausea and constipation. Narcotics can slow down your bowels and cause you to stay in the hospital longer.     Our goal is to keep you comfortable; eating and drinking normally and back home safely.       What can I eat or drink?  -  You may eat and drink normally up to 8 hours prior to arrival time. (Until 9:30 pm on 8/12/25)  -  You may have clear liquids  until 2 hours prior to arrival time. (Until 3:30 am on 8/13/25)    Examples of clear liquids:  Water  Clear broth  Juices (apple, white grape, white cranberry  and cider) without pulp  Noncarbonated, powder based beverages  (lemonade and Robe-Aid)  Sodas (Sprite, 7-Up, ginger ale and seltzer)  Coffee or tea (without milk or cream)  Gatorade    -  No Alcohol or cannabis products for at least 24 hours before surgery.     Which medicines can I take?    Hold Aspirin for 7 days before surgery.   Hold Multivitamins for 7 days before surgery.  Hold Supplements for 7 days before surgery.  Hold Ibuprofen (Advil, Motrin) for 1 day(s) before surgery--unless otherwise directed by surgeon.  Hold Naproxen (Aleve) for 4 days before surgery.  Hold Ozempic for at least 7 days before surgery. Do not take your dose on 8/6/25.    -  DO NOT take these medications the day of surgery:  Ezetimibe (Zetia)  Folic acid  Humulin 70/30 insulin  Losartan-Hydrochlorothiazide (Hyzaar)  Metformin (Glucophage)  Minoxidil (Loniten)  Vitamin D  Insulin Aspart (Novolog 70/30)  Insulin Lispro (Humalog)  Lisinopril-Hydrochlorothiazide (Zestoretic)    -  PLEASE CONTINUE TO TAKE these medications per your usual routine:  Acetaminophen (Tylenol) if needed  Atenolol (Tenormin)  Duloxetine (Cymbalta)  Levothyroxine (Synthroid)  Lorazepam (Ativan) if needed for anxiety  Lunesta if needed the night before surgery for sleep  Patanol eye drops and you may bring this to the hospital  Escitalopram (Lexapro)  Metoprolol (Lopressor)  Pravastatin (Crestor)      How do I prepare myself?  - Please take 2 showers (one the night prior to surgery and one the morning of surgery) using Scrubcare or Hibiclens soap.    Use this soap only from the neck to your toes. Avoid genital area      Leave the soap on your skin for one minute--then rinse thoroughly.      You may use your own shampoo and conditioner. No other hair products.   - Please remove all jewelry and body  piercings.  - No lotions, deodorants or fragrance.  - No makeup or fingernail polish.   - Bring your ID and insurance card.    -For patients being admitted to the [facility]  Family members are to take the patient belongings with them and place them in the lockers provided in the Family Lounge.  Please limit the items you bring to 1 bag as the lockers are small.      -If you use a CPAP machine, please bring the CPAP machine, tubing, and mask to hospital.    -If you have a Deep Brain Stimulator, Spinal Cord Stimulator, or any Neuro Stimulator device---you must bring the remote control to the hospital.      ALL PATIENTS GOING HOME THE SAME DAY OF SURGERY ARE REQUIRED TO HAVE A RESPONSIBLE ADULT TO DRIVE AND BE IN ATTENDANCE WITH THEM FOR 24 HOURS FOLLOWING SURGERY.    Covid testing policy as of 12/06/2022  Your surgeon will notify and schedule you for a COVID test if one is needed before surgery--please direct any questions or COVID symptoms to your surgeon      Questions or Concerns:    - For any questions regarding the day of surgery or your hospital stay, please contact the Pre Admission Nursing Office at [phone].       - If you have health changes between today and your surgery, please call your surgeon.       - For questions after surgery, please call your surgeons office.           Current Visitor Guidelines    2 adult visitors for adult patients in the pre op area    If additional visitors come (beyond a patient care attendant or a group home caregiver), the additional visitors will be asked to wait in the main lobby of the hospital    Visiting hours: 8 a.m. to 8:30 p.m.    Patients confirmed or suspected to have symptoms of COVID 19 or flu:     No visitors allowed for adult patients.   Children (under age 18) can have 1 named visitor.     People who are sick or showing symptoms of COVID 19 or flu:    Are not allowed to visit patients--we can only make exceptions in special situations.       Please  follow these guidelines for your visit:          Please maintain social distance          Masking is optional--however at times you may be asked to wear a mask for the safety of yourself and others     Clean your hands with alcohol hand . Do this when you arrive at and leave the building and patient room,    And again after you touch your mask or anything in the room.     Go directly to and from the room you are visiting.     Stay in the patient s room during your visit. Limit going to other places in the hospital as much as possible     Leave bags and jackets at home or in the car.     For everyone s health, please don t come and go during your visit. That includes for smoking   during your visit.
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
## 2025-07-30 ENCOUNTER — PRE VISIT (OUTPATIENT)
Dept: SURGERY | Facility: CLINIC | Age: 55
End: 2025-07-30

## 2025-07-31 DIAGNOSIS — R91.1 LUNG NODULE: Primary | ICD-10-CM

## 2025-08-04 DIAGNOSIS — R91.1 LUNG NODULE: Primary | ICD-10-CM

## 2025-08-05 ENCOUNTER — MYC MEDICAL ADVICE (OUTPATIENT)
Dept: SURGERY | Facility: CLINIC | Age: 55
End: 2025-08-05
Payer: COMMERCIAL

## 2025-08-12 ASSESSMENT — ENCOUNTER SYMPTOMS: ORTHOPNEA: 0

## 2025-08-12 ASSESSMENT — LIFESTYLE VARIABLES: TOBACCO_USE: 0

## 2025-08-13 ENCOUNTER — ANESTHESIA (OUTPATIENT)
Dept: SURGERY | Facility: CLINIC | Age: 55
End: 2025-08-13
Payer: COMMERCIAL

## 2025-08-13 ENCOUNTER — APPOINTMENT (OUTPATIENT)
Dept: GENERAL RADIOLOGY | Facility: CLINIC | Age: 55
DRG: 164 | End: 2025-08-13
Attending: STUDENT IN AN ORGANIZED HEALTH CARE EDUCATION/TRAINING PROGRAM
Payer: COMMERCIAL

## 2025-08-13 ENCOUNTER — HOSPITAL ENCOUNTER (INPATIENT)
Facility: CLINIC | Age: 55
DRG: 164 | End: 2025-08-13
Attending: STUDENT IN AN ORGANIZED HEALTH CARE EDUCATION/TRAINING PROGRAM | Admitting: STUDENT IN AN ORGANIZED HEALTH CARE EDUCATION/TRAINING PROGRAM
Payer: COMMERCIAL

## 2025-08-13 DIAGNOSIS — D3A.090 CARCINOID TUMOR OF RIGHT LUNG (H): Primary | ICD-10-CM

## 2025-08-13 LAB
BASE EXCESS BLDA CALC-SCNC: -1 MMOL/L (ref -3–3)
BASE EXCESS BLDA CALC-SCNC: 0.2 MMOL/L (ref -3–3)
CA-I BLD-MCNC: 4.5 MG/DL (ref 4.4–5.2)
CA-I BLD-MCNC: 4.5 MG/DL (ref 4.4–5.2)
GLUCOSE BLD-MCNC: 147 MG/DL (ref 70–99)
GLUCOSE BLD-MCNC: 192 MG/DL (ref 70–99)
GLUCOSE BLDC GLUCOMTR-MCNC: 171 MG/DL (ref 70–99)
GLUCOSE BLDC GLUCOMTR-MCNC: 193 MG/DL (ref 70–99)
GLUCOSE BLDC GLUCOMTR-MCNC: 224 MG/DL (ref 70–99)
GLUCOSE BLDC GLUCOMTR-MCNC: 228 MG/DL (ref 70–99)
HCO3 BLDA-SCNC: 25 MMOL/L (ref 21–28)
HCO3 BLDA-SCNC: 26 MMOL/L (ref 21–28)
HGB BLD-MCNC: 12.2 G/DL (ref 11.7–15.7)
HGB BLD-MCNC: 12.3 G/DL (ref 11.7–15.7)
LACTATE BLD-SCNC: 1.5 MMOL/L (ref 0.7–2)
LACTATE BLD-SCNC: 1.8 MMOL/L (ref 0.7–2)
O2/TOTAL GAS SETTING VFR VENT: 94 %
O2/TOTAL GAS SETTING VFR VENT: 96 %
OXYHGB MFR BLDA: 93 % (ref 92–100)
OXYHGB MFR BLDA: 95 % (ref 92–100)
PATH REPORT.COMMENTS IMP SPEC: NORMAL
PATH REPORT.INTRAOP OBS SPEC DOC: NORMAL
PCO2 BLDA: 42 MM HG (ref 35–45)
PCO2 BLDA: 50 MM HG (ref 35–45)
PH BLDA: 7.32 [PH] (ref 7.35–7.45)
PH BLDA: 7.39 [PH] (ref 7.35–7.45)
PO2 BLDA: 78 MM HG (ref 80–105)
PO2 BLDA: 93 MM HG (ref 80–105)
POTASSIUM BLD-SCNC: 3.5 MMOL/L (ref 3.4–5.3)
POTASSIUM BLD-SCNC: 3.7 MMOL/L (ref 3.4–5.3)
SAO2 % BLDA: 95 % (ref 96–97)
SAO2 % BLDA: 97 % (ref 96–97)
SODIUM BLD-SCNC: 139 MMOL/L (ref 135–145)
SODIUM BLD-SCNC: 142 MMOL/L (ref 135–145)

## 2025-08-13 PROCEDURE — 258N000003 HC RX IP 258 OP 636

## 2025-08-13 PROCEDURE — 999N000157 HC STATISTIC RCP TIME EA 10 MIN

## 2025-08-13 PROCEDURE — 94640 AIRWAY INHALATION TREATMENT: CPT

## 2025-08-13 PROCEDURE — 250N000011 HC RX IP 250 OP 636: Performed by: STUDENT IN AN ORGANIZED HEALTH CARE EDUCATION/TRAINING PROGRAM

## 2025-08-13 PROCEDURE — 32666 THORACOSCOPY W/WEDGE RESECT: CPT | Mod: RT | Performed by: STUDENT IN AN ORGANIZED HEALTH CARE EDUCATION/TRAINING PROGRAM

## 2025-08-13 PROCEDURE — 88305 TISSUE EXAM BY PATHOLOGIST: CPT | Mod: 26 | Performed by: PATHOLOGY

## 2025-08-13 PROCEDURE — 94660 CPAP INITIATION&MGMT: CPT

## 2025-08-13 PROCEDURE — 370N000017 HC ANESTHESIA TECHNICAL FEE, PER MIN: Performed by: STUDENT IN AN ORGANIZED HEALTH CARE EDUCATION/TRAINING PROGRAM

## 2025-08-13 PROCEDURE — 250N000013 HC RX MED GY IP 250 OP 250 PS 637

## 2025-08-13 PROCEDURE — 88332 PATH CONSLTJ SURG EA ADD BLK: CPT | Mod: 26 | Performed by: STUDENT IN AN ORGANIZED HEALTH CARE EDUCATION/TRAINING PROGRAM

## 2025-08-13 PROCEDURE — 272N000001 HC OR GENERAL SUPPLY STERILE: Performed by: STUDENT IN AN ORGANIZED HEALTH CARE EDUCATION/TRAINING PROGRAM

## 2025-08-13 PROCEDURE — 250N000009 HC RX 250: Performed by: STUDENT IN AN ORGANIZED HEALTH CARE EDUCATION/TRAINING PROGRAM

## 2025-08-13 PROCEDURE — S2900 ROBOTIC SURGICAL SYSTEM: HCPCS | Mod: GC | Performed by: STUDENT IN AN ORGANIZED HEALTH CARE EDUCATION/TRAINING PROGRAM

## 2025-08-13 PROCEDURE — 88331 PATH CONSLTJ SURG 1 BLK 1SPC: CPT | Mod: 26 | Performed by: STUDENT IN AN ORGANIZED HEALTH CARE EDUCATION/TRAINING PROGRAM

## 2025-08-13 PROCEDURE — 250N000012 HC RX MED GY IP 250 OP 636 PS 637: Performed by: STUDENT IN AN ORGANIZED HEALTH CARE EDUCATION/TRAINING PROGRAM

## 2025-08-13 PROCEDURE — 710N000010 HC RECOVERY PHASE 1, LEVEL 2, PER MIN: Performed by: STUDENT IN AN ORGANIZED HEALTH CARE EDUCATION/TRAINING PROGRAM

## 2025-08-13 PROCEDURE — 88341 IMHCHEM/IMCYTCHM EA ADD ANTB: CPT | Mod: 26 | Performed by: PATHOLOGY

## 2025-08-13 PROCEDURE — 84295 ASSAY OF SERUM SODIUM: CPT

## 2025-08-13 PROCEDURE — 360N000080 HC SURGERY LEVEL 7, PER MIN: Performed by: STUDENT IN AN ORGANIZED HEALTH CARE EDUCATION/TRAINING PROGRAM

## 2025-08-13 PROCEDURE — 250N000009 HC RX 250

## 2025-08-13 PROCEDURE — 88360 TUMOR IMMUNOHISTOCHEM/MANUAL: CPT | Mod: 26 | Performed by: PATHOLOGY

## 2025-08-13 PROCEDURE — 32674 THORACOSCOPY LYMPH NODE EXC: CPT | Mod: GC | Performed by: STUDENT IN AN ORGANIZED HEALTH CARE EDUCATION/TRAINING PROGRAM

## 2025-08-13 PROCEDURE — 250N000025 HC SEVOFLURANE, PER MIN: Performed by: STUDENT IN AN ORGANIZED HEALTH CARE EDUCATION/TRAINING PROGRAM

## 2025-08-13 PROCEDURE — 250N000013 HC RX MED GY IP 250 OP 250 PS 637: Performed by: STUDENT IN AN ORGANIZED HEALTH CARE EDUCATION/TRAINING PROGRAM

## 2025-08-13 PROCEDURE — 120N000002 HC R&B MED SURG/OB UMMC

## 2025-08-13 PROCEDURE — 999N000065 XR CHEST PORT 1 VIEW

## 2025-08-13 PROCEDURE — 250N000011 HC RX IP 250 OP 636

## 2025-08-13 PROCEDURE — 71045 X-RAY EXAM CHEST 1 VIEW: CPT | Mod: 26 | Performed by: RADIOLOGY

## 2025-08-13 PROCEDURE — 88331 PATH CONSLTJ SURG 1 BLK 1SPC: CPT | Mod: TC | Performed by: STUDENT IN AN ORGANIZED HEALTH CARE EDUCATION/TRAINING PROGRAM

## 2025-08-13 PROCEDURE — 88307 TISSUE EXAM BY PATHOLOGIST: CPT | Mod: 26 | Performed by: PATHOLOGY

## 2025-08-13 PROCEDURE — 250N000012 HC RX MED GY IP 250 OP 636 PS 637

## 2025-08-13 PROCEDURE — 88342 IMHCHEM/IMCYTCHM 1ST ANTB: CPT | Mod: 26 | Performed by: PATHOLOGY

## 2025-08-13 PROCEDURE — 999N000141 HC STATISTIC PRE-PROCEDURE NURSING ASSESSMENT: Performed by: STUDENT IN AN ORGANIZED HEALTH CARE EDUCATION/TRAINING PROGRAM

## 2025-08-13 RX ORDER — NALOXONE HYDROCHLORIDE 0.4 MG/ML
0.4 INJECTION, SOLUTION INTRAMUSCULAR; INTRAVENOUS; SUBCUTANEOUS
Status: DISCONTINUED | OUTPATIENT
Start: 2025-08-13 | End: 2025-08-15 | Stop reason: HOSPADM

## 2025-08-13 RX ORDER — AMOXICILLIN 250 MG
1 CAPSULE ORAL 2 TIMES DAILY
Status: DISCONTINUED | OUTPATIENT
Start: 2025-08-13 | End: 2025-08-15 | Stop reason: HOSPADM

## 2025-08-13 RX ORDER — ONDANSETRON 4 MG/1
4 TABLET, ORALLY DISINTEGRATING ORAL EVERY 30 MIN PRN
Status: DISCONTINUED | OUTPATIENT
Start: 2025-08-13 | End: 2025-08-13 | Stop reason: HOSPADM

## 2025-08-13 RX ORDER — FOLIC ACID 1 MG/1
2 TABLET ORAL DAILY
Status: DISCONTINUED | OUTPATIENT
Start: 2025-08-13 | End: 2025-08-15 | Stop reason: HOSPADM

## 2025-08-13 RX ORDER — ATENOLOL 50 MG/1
50 TABLET ORAL EVERY MORNING
Status: DISCONTINUED | OUTPATIENT
Start: 2025-08-14 | End: 2025-08-15 | Stop reason: HOSPADM

## 2025-08-13 RX ORDER — NALOXONE HYDROCHLORIDE 0.4 MG/ML
0.2 INJECTION, SOLUTION INTRAMUSCULAR; INTRAVENOUS; SUBCUTANEOUS
Status: DISCONTINUED | OUTPATIENT
Start: 2025-08-13 | End: 2025-08-15 | Stop reason: HOSPADM

## 2025-08-13 RX ORDER — SODIUM CHLORIDE, SODIUM LACTATE, POTASSIUM CHLORIDE, CALCIUM CHLORIDE 600; 310; 30; 20 MG/100ML; MG/100ML; MG/100ML; MG/100ML
INJECTION, SOLUTION INTRAVENOUS CONTINUOUS PRN
Status: DISCONTINUED | OUTPATIENT
Start: 2025-08-13 | End: 2025-08-13

## 2025-08-13 RX ORDER — FENTANYL CITRATE 50 UG/ML
INJECTION, SOLUTION INTRAMUSCULAR; INTRAVENOUS PRN
Status: DISCONTINUED | OUTPATIENT
Start: 2025-08-13 | End: 2025-08-13

## 2025-08-13 RX ORDER — LIDOCAINE 40 MG/G
CREAM TOPICAL
Status: DISCONTINUED | OUTPATIENT
Start: 2025-08-13 | End: 2025-08-15 | Stop reason: HOSPADM

## 2025-08-13 RX ORDER — FLUTICASONE PROPIONATE 110 UG/1
2 AEROSOL, METERED RESPIRATORY (INHALATION) 2 TIMES DAILY
Status: CANCELLED | OUTPATIENT
Start: 2025-08-13

## 2025-08-13 RX ORDER — DULOXETIN HYDROCHLORIDE 60 MG/1
60 CAPSULE, DELAYED RELEASE ORAL EVERY MORNING
Status: DISCONTINUED | OUTPATIENT
Start: 2025-08-14 | End: 2025-08-13

## 2025-08-13 RX ORDER — FENTANYL CITRATE 50 UG/ML
50 INJECTION, SOLUTION INTRAMUSCULAR; INTRAVENOUS EVERY 5 MIN PRN
Status: DISCONTINUED | OUTPATIENT
Start: 2025-08-13 | End: 2025-08-13 | Stop reason: HOSPADM

## 2025-08-13 RX ORDER — BUPIVACAINE HCL/EPINEPHRINE 0.25-.0005
VIAL (ML) INJECTION PRN
Status: DISCONTINUED | OUTPATIENT
Start: 2025-08-13 | End: 2025-08-13 | Stop reason: HOSPADM

## 2025-08-13 RX ORDER — BISACODYL 10 MG
10 SUPPOSITORY, RECTAL RECTAL DAILY PRN
Status: DISCONTINUED | OUTPATIENT
Start: 2025-08-16 | End: 2025-08-15 | Stop reason: HOSPADM

## 2025-08-13 RX ORDER — CEFAZOLIN SODIUM/WATER 2 G/20 ML
2 SYRINGE (ML) INTRAVENOUS SEE ADMIN INSTRUCTIONS
Status: DISCONTINUED | OUTPATIENT
Start: 2025-08-13 | End: 2025-08-15

## 2025-08-13 RX ORDER — HYDROMORPHONE HCL IN WATER/PF 6 MG/30 ML
0.4 PATIENT CONTROLLED ANALGESIA SYRINGE INTRAVENOUS
Status: DISCONTINUED | OUTPATIENT
Start: 2025-08-13 | End: 2025-08-15 | Stop reason: HOSPADM

## 2025-08-13 RX ORDER — KETOROLAC TROMETHAMINE 30 MG/ML
INJECTION, SOLUTION INTRAMUSCULAR; INTRAVENOUS PRN
Status: DISCONTINUED | OUTPATIENT
Start: 2025-08-13 | End: 2025-08-13

## 2025-08-13 RX ORDER — ASPIRIN 81 MG/1
81 TABLET, CHEWABLE ORAL AT BEDTIME
Status: DISCONTINUED | OUTPATIENT
Start: 2025-08-13 | End: 2025-08-15 | Stop reason: HOSPADM

## 2025-08-13 RX ORDER — OLOPATADINE HYDROCHLORIDE 1 MG/ML
1 SOLUTION OPHTHALMIC DAILY
COMMUNITY

## 2025-08-13 RX ORDER — NALOXONE HYDROCHLORIDE 0.4 MG/ML
0.1 INJECTION, SOLUTION INTRAMUSCULAR; INTRAVENOUS; SUBCUTANEOUS
Status: DISCONTINUED | OUTPATIENT
Start: 2025-08-13 | End: 2025-08-13 | Stop reason: HOSPADM

## 2025-08-13 RX ORDER — HYDROMORPHONE HCL IN WATER/PF 6 MG/30 ML
0.2 PATIENT CONTROLLED ANALGESIA SYRINGE INTRAVENOUS EVERY 5 MIN PRN
Status: DISCONTINUED | OUTPATIENT
Start: 2025-08-13 | End: 2025-08-13 | Stop reason: HOSPADM

## 2025-08-13 RX ORDER — ENOXAPARIN SODIUM 100 MG/ML
40 INJECTION SUBCUTANEOUS
Status: COMPLETED | OUTPATIENT
Start: 2025-08-13 | End: 2025-08-13

## 2025-08-13 RX ORDER — DEXAMETHASONE SODIUM PHOSPHATE 4 MG/ML
4 INJECTION, SOLUTION INTRA-ARTICULAR; INTRALESIONAL; INTRAMUSCULAR; INTRAVENOUS; SOFT TISSUE
Status: DISCONTINUED | OUTPATIENT
Start: 2025-08-13 | End: 2025-08-13 | Stop reason: HOSPADM

## 2025-08-13 RX ORDER — HYDROMORPHONE HCL IN WATER/PF 6 MG/30 ML
0.4 PATIENT CONTROLLED ANALGESIA SYRINGE INTRAVENOUS EVERY 5 MIN PRN
Status: DISCONTINUED | OUTPATIENT
Start: 2025-08-13 | End: 2025-08-13 | Stop reason: HOSPADM

## 2025-08-13 RX ORDER — SODIUM CHLORIDE, SODIUM GLUCONATE, SODIUM ACETATE, POTASSIUM CHLORIDE AND MAGNESIUM CHLORIDE 526; 502; 368; 37; 30 MG/100ML; MG/100ML; MG/100ML; MG/100ML; MG/100ML
INJECTION, SOLUTION INTRAVENOUS CONTINUOUS PRN
Status: DISCONTINUED | OUTPATIENT
Start: 2025-08-13 | End: 2025-08-13

## 2025-08-13 RX ORDER — PHENYLEPHRINE HCL IN 0.9% NACL 50MG/250ML
PLASTIC BAG, INJECTION (ML) INTRAVENOUS CONTINUOUS PRN
Status: DISCONTINUED | OUTPATIENT
Start: 2025-08-13 | End: 2025-08-13

## 2025-08-13 RX ORDER — POLYETHYLENE GLYCOL 3350 17 G/17G
17 POWDER, FOR SOLUTION ORAL DAILY
Status: DISCONTINUED | OUTPATIENT
Start: 2025-08-14 | End: 2025-08-15 | Stop reason: HOSPADM

## 2025-08-13 RX ORDER — ONDANSETRON 2 MG/ML
4 INJECTION INTRAMUSCULAR; INTRAVENOUS EVERY 30 MIN PRN
Status: DISCONTINUED | OUTPATIENT
Start: 2025-08-13 | End: 2025-08-13 | Stop reason: HOSPADM

## 2025-08-13 RX ORDER — ENOXAPARIN SODIUM 100 MG/ML
40 INJECTION SUBCUTANEOUS EVERY 12 HOURS
Status: DISCONTINUED | OUTPATIENT
Start: 2025-08-14 | End: 2025-08-15 | Stop reason: HOSPADM

## 2025-08-13 RX ORDER — HYDROMORPHONE HYDROCHLORIDE 2 MG/1
2 TABLET ORAL EVERY 4 HOURS PRN
Status: DISCONTINUED | OUTPATIENT
Start: 2025-08-13 | End: 2025-08-15 | Stop reason: HOSPADM

## 2025-08-13 RX ORDER — FENTANYL CITRATE 50 UG/ML
25 INJECTION, SOLUTION INTRAMUSCULAR; INTRAVENOUS EVERY 5 MIN PRN
Status: DISCONTINUED | OUTPATIENT
Start: 2025-08-13 | End: 2025-08-13 | Stop reason: HOSPADM

## 2025-08-13 RX ORDER — DULOXETIN HYDROCHLORIDE 30 MG/1
30 CAPSULE, DELAYED RELEASE ORAL EVERY MORNING
Status: DISCONTINUED | OUTPATIENT
Start: 2025-08-14 | End: 2025-08-13

## 2025-08-13 RX ORDER — ONDANSETRON 2 MG/ML
INJECTION INTRAMUSCULAR; INTRAVENOUS PRN
Status: DISCONTINUED | OUTPATIENT
Start: 2025-08-13 | End: 2025-08-13

## 2025-08-13 RX ORDER — NICOTINE POLACRILEX 4 MG
15-30 LOZENGE BUCCAL
Status: DISCONTINUED | OUTPATIENT
Start: 2025-08-13 | End: 2025-08-15 | Stop reason: HOSPADM

## 2025-08-13 RX ORDER — ACETAMINOPHEN 500 MG
1000 TABLET ORAL EVERY 6 HOURS
Status: DISCONTINUED | OUTPATIENT
Start: 2025-08-13 | End: 2025-08-15 | Stop reason: HOSPADM

## 2025-08-13 RX ORDER — LIDOCAINE HYDROCHLORIDE 20 MG/ML
INJECTION, SOLUTION INFILTRATION; PERINEURAL PRN
Status: DISCONTINUED | OUTPATIENT
Start: 2025-08-13 | End: 2025-08-13

## 2025-08-13 RX ORDER — ESZOPICLONE 1 MG/1
3 TABLET, FILM COATED ORAL AT BEDTIME
Status: DISCONTINUED | OUTPATIENT
Start: 2025-08-13 | End: 2025-08-15 | Stop reason: HOSPADM

## 2025-08-13 RX ORDER — ACETAMINOPHEN 325 MG/1
975 TABLET ORAL ONCE
Status: COMPLETED | OUTPATIENT
Start: 2025-08-13 | End: 2025-08-13

## 2025-08-13 RX ORDER — HYDROMORPHONE HYDROCHLORIDE 4 MG/1
4 TABLET ORAL EVERY 4 HOURS PRN
Status: DISCONTINUED | OUTPATIENT
Start: 2025-08-13 | End: 2025-08-15 | Stop reason: HOSPADM

## 2025-08-13 RX ORDER — EZETIMIBE 10 MG/1
10 TABLET ORAL EVERY MORNING
Status: DISCONTINUED | OUTPATIENT
Start: 2025-08-14 | End: 2025-08-15 | Stop reason: HOSPADM

## 2025-08-13 RX ORDER — DEXAMETHASONE SODIUM PHOSPHATE 4 MG/ML
INJECTION, SOLUTION INTRA-ARTICULAR; INTRALESIONAL; INTRAMUSCULAR; INTRAVENOUS; SOFT TISSUE PRN
Status: DISCONTINUED | OUTPATIENT
Start: 2025-08-13 | End: 2025-08-13

## 2025-08-13 RX ORDER — ONDANSETRON 4 MG/1
4 TABLET, ORALLY DISINTEGRATING ORAL EVERY 6 HOURS PRN
Status: CANCELLED | OUTPATIENT
Start: 2025-08-13

## 2025-08-13 RX ORDER — HYDROMORPHONE HYDROCHLORIDE 4 MG/ML
INJECTION, SOLUTION INTRAMUSCULAR; INTRAVENOUS; SUBCUTANEOUS PRN
Status: DISCONTINUED | OUTPATIENT
Start: 2025-08-13 | End: 2025-08-13

## 2025-08-13 RX ORDER — ALBUTEROL SULFATE 0.83 MG/ML
2.5 SOLUTION RESPIRATORY (INHALATION) 4 TIMES DAILY
Status: DISCONTINUED | OUTPATIENT
Start: 2025-08-13 | End: 2025-08-15

## 2025-08-13 RX ORDER — PROPOFOL 10 MG/ML
INJECTION, EMULSION INTRAVENOUS PRN
Status: DISCONTINUED | OUTPATIENT
Start: 2025-08-13 | End: 2025-08-13

## 2025-08-13 RX ORDER — FLUTICASONE PROPIONATE 50 MCG
1-2 SPRAY, SUSPENSION (ML) NASAL DAILY
Status: CANCELLED | OUTPATIENT
Start: 2025-08-13

## 2025-08-13 RX ORDER — ONDANSETRON 2 MG/ML
4 INJECTION INTRAMUSCULAR; INTRAVENOUS EVERY 6 HOURS PRN
Status: CANCELLED | OUTPATIENT
Start: 2025-08-13

## 2025-08-13 RX ORDER — HYDROMORPHONE HCL IN WATER/PF 6 MG/30 ML
0.2 PATIENT CONTROLLED ANALGESIA SYRINGE INTRAVENOUS
Status: DISCONTINUED | OUTPATIENT
Start: 2025-08-13 | End: 2025-08-15 | Stop reason: HOSPADM

## 2025-08-13 RX ORDER — SODIUM CHLORIDE, SODIUM LACTATE, POTASSIUM CHLORIDE, CALCIUM CHLORIDE 600; 310; 30; 20 MG/100ML; MG/100ML; MG/100ML; MG/100ML
INJECTION, SOLUTION INTRAVENOUS CONTINUOUS
Status: DISCONTINUED | OUTPATIENT
Start: 2025-08-13 | End: 2025-08-13 | Stop reason: HOSPADM

## 2025-08-13 RX ORDER — PROCHLORPERAZINE MALEATE 10 MG
10 TABLET ORAL EVERY 6 HOURS PRN
Status: CANCELLED | OUTPATIENT
Start: 2025-08-13

## 2025-08-13 RX ORDER — CEFAZOLIN SODIUM/WATER 2 G/20 ML
2 SYRINGE (ML) INTRAVENOUS
Status: COMPLETED | OUTPATIENT
Start: 2025-08-13 | End: 2025-08-13

## 2025-08-13 RX ORDER — ESZOPICLONE 3 MG/1
3 TABLET, FILM COATED ORAL AT BEDTIME
COMMUNITY

## 2025-08-13 RX ORDER — DEXTROSE MONOHYDRATE 25 G/50ML
25-50 INJECTION, SOLUTION INTRAVENOUS
Status: DISCONTINUED | OUTPATIENT
Start: 2025-08-13 | End: 2025-08-15 | Stop reason: HOSPADM

## 2025-08-13 RX ADMIN — PROPOFOL 150 MG: 10 INJECTION, EMULSION INTRAVENOUS at 07:44

## 2025-08-13 RX ADMIN — ACETAMINOPHEN 975 MG: 325 TABLET ORAL at 06:59

## 2025-08-13 RX ADMIN — PROPOFOL 50 MCG/KG/MIN: 10 INJECTION, EMULSION INTRAVENOUS at 08:39

## 2025-08-13 RX ADMIN — FENTANYL CITRATE 50 MCG: 50 INJECTION INTRAMUSCULAR; INTRAVENOUS at 08:56

## 2025-08-13 RX ADMIN — PHENYLEPHRINE HYDROCHLORIDE 100 MCG: 10 INJECTION INTRAVENOUS at 08:44

## 2025-08-13 RX ADMIN — ASPIRIN 81 MG CHEWABLE TABLET 81 MG: 81 TABLET CHEWABLE at 22:28

## 2025-08-13 RX ADMIN — IBUPROFEN 800 MG: 600 TABLET, FILM COATED ORAL at 20:39

## 2025-08-13 RX ADMIN — PHENYLEPHRINE HYDROCHLORIDE 100 MCG: 10 INJECTION INTRAVENOUS at 10:54

## 2025-08-13 RX ADMIN — DEXMEDETOMIDINE HYDROCHLORIDE 16 MCG: 100 INJECTION, SOLUTION INTRAVENOUS at 07:40

## 2025-08-13 RX ADMIN — ESZOPICLONE 3 MG: 1 TABLET, FILM COATED ORAL at 22:57

## 2025-08-13 RX ADMIN — SENNOSIDES, DOCUSATE SODIUM 1 TABLET: 50; 8.6 TABLET, FILM COATED ORAL at 20:40

## 2025-08-13 RX ADMIN — Medication 20 MG: at 09:42

## 2025-08-13 RX ADMIN — ACETAMINOPHEN 1000 MG: 500 TABLET ORAL at 16:27

## 2025-08-13 RX ADMIN — LIDOCAINE HYDROCHLORIDE 100 MG: 20 INJECTION, SOLUTION INFILTRATION; PERINEURAL at 07:43

## 2025-08-13 RX ADMIN — IBUPROFEN 800 MG: 600 TABLET, FILM COATED ORAL at 16:28

## 2025-08-13 RX ADMIN — HYDROMORPHONE HYDROCHLORIDE 0.5 MG: 4 INJECTION, SOLUTION INTRAMUSCULAR; INTRAVENOUS; SUBCUTANEOUS at 10:32

## 2025-08-13 RX ADMIN — FENTANYL CITRATE 100 MCG: 50 INJECTION INTRAMUSCULAR; INTRAVENOUS at 07:44

## 2025-08-13 RX ADMIN — Medication 0.3 MCG/KG/MIN: at 08:39

## 2025-08-13 RX ADMIN — FENTANYL CITRATE 100 MCG: 50 INJECTION INTRAMUSCULAR; INTRAVENOUS at 08:18

## 2025-08-13 RX ADMIN — ONDANSETRON 4 MG: 2 INJECTION INTRAMUSCULAR; INTRAVENOUS at 10:32

## 2025-08-13 RX ADMIN — Medication 100 MG: at 07:44

## 2025-08-13 RX ADMIN — ALBUTEROL SULFATE 2.5 MG: 2.5 SOLUTION RESPIRATORY (INHALATION) at 20:15

## 2025-08-13 RX ADMIN — SODIUM CHLORIDE, SODIUM GLUCONATE, SODIUM ACETATE, POTASSIUM CHLORIDE AND MAGNESIUM CHLORIDE: 526; 502; 368; 37; 30 INJECTION, SOLUTION INTRAVENOUS at 07:35

## 2025-08-13 RX ADMIN — INSULIN HUMAN 3 UNITS/HR: 1 INJECTION, SOLUTION INTRAVENOUS at 08:39

## 2025-08-13 RX ADMIN — PHENYLEPHRINE HYDROCHLORIDE 50 MCG: 10 INJECTION INTRAVENOUS at 10:18

## 2025-08-13 RX ADMIN — PHENYLEPHRINE HYDROCHLORIDE 100 MCG: 10 INJECTION INTRAVENOUS at 11:10

## 2025-08-13 RX ADMIN — SODIUM CHLORIDE, SODIUM LACTATE, POTASSIUM CHLORIDE, AND CALCIUM CHLORIDE: .6; .31; .03; .02 INJECTION, SOLUTION INTRAVENOUS at 07:44

## 2025-08-13 RX ADMIN — FENTANYL CITRATE 50 MCG: 50 INJECTION INTRAMUSCULAR; INTRAVENOUS at 09:05

## 2025-08-13 RX ADMIN — MIDAZOLAM 1 MG: 1 INJECTION INTRAMUSCULAR; INTRAVENOUS at 07:36

## 2025-08-13 RX ADMIN — Medication 10 MG: at 10:04

## 2025-08-13 RX ADMIN — Medication 20 MG: at 09:08

## 2025-08-13 RX ADMIN — PHENYLEPHRINE HYDROCHLORIDE 100 MCG: 10 INJECTION INTRAVENOUS at 08:33

## 2025-08-13 RX ADMIN — Medication 100 MG: at 10:45

## 2025-08-13 RX ADMIN — PROPOFOL 50 MG: 10 INJECTION, EMULSION INTRAVENOUS at 07:46

## 2025-08-13 RX ADMIN — FENTANYL CITRATE 50 MCG: 50 INJECTION INTRAMUSCULAR; INTRAVENOUS at 10:04

## 2025-08-13 RX ADMIN — MIDAZOLAM 1 MG: 1 INJECTION INTRAMUSCULAR; INTRAVENOUS at 07:30

## 2025-08-13 RX ADMIN — ENOXAPARIN SODIUM 40 MG: 40 INJECTION SUBCUTANEOUS at 06:59

## 2025-08-13 RX ADMIN — PHENYLEPHRINE HYDROCHLORIDE 100 MCG: 10 INJECTION INTRAVENOUS at 08:31

## 2025-08-13 RX ADMIN — PHENYLEPHRINE HYDROCHLORIDE 100 MCG: 10 INJECTION INTRAVENOUS at 08:39

## 2025-08-13 RX ADMIN — KETOROLAC TROMETHAMINE 30 MG: 30 INJECTION, SOLUTION INTRAMUSCULAR at 10:32

## 2025-08-13 RX ADMIN — FOLIC ACID 2 MG: 1 TABLET ORAL at 16:28

## 2025-08-13 RX ADMIN — ACETAMINOPHEN 1000 MG: 500 TABLET ORAL at 20:40

## 2025-08-13 RX ADMIN — DEXAMETHASONE SODIUM PHOSPHATE 4 MG: 4 INJECTION, SOLUTION INTRAMUSCULAR; INTRAVENOUS at 09:25

## 2025-08-13 RX ADMIN — INSULIN ASPART 2 UNITS: 100 INJECTION, SOLUTION INTRAVENOUS; SUBCUTANEOUS at 17:31

## 2025-08-13 RX ADMIN — Medication 2 G: at 08:15

## 2025-08-13 RX ADMIN — PHENYLEPHRINE HYDROCHLORIDE 100 MCG: 10 INJECTION INTRAVENOUS at 10:12

## 2025-08-13 RX ADMIN — INSULIN HUMAN 3 UNITS: 100 INJECTION, SOLUTION PARENTERAL at 10:02

## 2025-08-13 RX ADMIN — FENTANYL CITRATE 50 MCG: 50 INJECTION INTRAMUSCULAR; INTRAVENOUS at 08:49

## 2025-08-13 ASSESSMENT — ACTIVITIES OF DAILY LIVING (ADL)
ADLS_ACUITY_SCORE: 35
ADLS_ACUITY_SCORE: 18
DIFFICULTY_EATING/SWALLOWING: NO
CHANGE_IN_FUNCTIONAL_STATUS_SINCE_ONSET_OF_CURRENT_ILLNESS/INJURY: NO
ADLS_ACUITY_SCORE: 35
HEARING_DIFFICULTY_OR_DEAF: NO
TOILETING_ISSUES: NO
ADLS_ACUITY_SCORE: 35
ADLS_ACUITY_SCORE: 35
ADLS_ACUITY_SCORE: 21
WALKING_OR_CLIMBING_STAIRS_DIFFICULTY: NO
FALL_HISTORY_WITHIN_LAST_SIX_MONTHS: NO
ADLS_ACUITY_SCORE: 21
ADLS_ACUITY_SCORE: 35
DIFFICULTY_COMMUNICATING: NO
ADLS_ACUITY_SCORE: 21
DOING_ERRANDS_INDEPENDENTLY_DIFFICULTY: NO
ADLS_ACUITY_SCORE: 35
ADLS_ACUITY_SCORE: 35
ADLS_ACUITY_SCORE: 21
ADLS_ACUITY_SCORE: 35
ADLS_ACUITY_SCORE: 35
ADLS_ACUITY_SCORE: 21
ADLS_ACUITY_SCORE: 21
CONCENTRATING,_REMEMBERING_OR_MAKING_DECISIONS_DIFFICULTY: NO
ADLS_ACUITY_SCORE: 35
DRESSING/BATHING_DIFFICULTY: NO
WEAR_GLASSES_OR_BLIND: YES
EQUIPMENT_CURRENTLY_USED_AT_HOME: GLUCOMETER
ADLS_ACUITY_SCORE: 35

## 2025-08-14 ENCOUNTER — APPOINTMENT (OUTPATIENT)
Dept: PHYSICAL THERAPY | Facility: CLINIC | Age: 55
DRG: 164 | End: 2025-08-14
Attending: STUDENT IN AN ORGANIZED HEALTH CARE EDUCATION/TRAINING PROGRAM
Payer: COMMERCIAL

## 2025-08-14 ENCOUNTER — APPOINTMENT (OUTPATIENT)
Dept: GENERAL RADIOLOGY | Facility: CLINIC | Age: 55
DRG: 164 | End: 2025-08-14
Payer: COMMERCIAL

## 2025-08-14 ENCOUNTER — APPOINTMENT (OUTPATIENT)
Dept: GENERAL RADIOLOGY | Facility: CLINIC | Age: 55
DRG: 164 | End: 2025-08-14
Attending: STUDENT IN AN ORGANIZED HEALTH CARE EDUCATION/TRAINING PROGRAM
Payer: COMMERCIAL

## 2025-08-14 VITALS
HEIGHT: 66 IN | TEMPERATURE: 98.6 F | DIASTOLIC BLOOD PRESSURE: 76 MMHG | RESPIRATION RATE: 20 BRPM | SYSTOLIC BLOOD PRESSURE: 132 MMHG | BODY MASS INDEX: 40.32 KG/M2 | WEIGHT: 250.9 LBS | HEART RATE: 93 BPM | OXYGEN SATURATION: 96 %

## 2025-08-14 LAB
ANION GAP SERPL CALCULATED.3IONS-SCNC: 11 MMOL/L (ref 7–15)
BUN SERPL-MCNC: 21.3 MG/DL (ref 6–20)
CALCIUM SERPL-MCNC: 9.2 MG/DL (ref 8.8–10.4)
CHLORIDE SERPL-SCNC: 102 MMOL/L (ref 98–107)
CREAT SERPL-MCNC: 1.05 MG/DL (ref 0.51–0.95)
EGFRCR SERPLBLD CKD-EPI 2021: 62 ML/MIN/1.73M2
ERYTHROCYTE [DISTWIDTH] IN BLOOD BY AUTOMATED COUNT: 13.8 % (ref 10–15)
GLUCOSE BLDC GLUCOMTR-MCNC: 199 MG/DL (ref 70–99)
GLUCOSE BLDC GLUCOMTR-MCNC: 205 MG/DL (ref 70–99)
GLUCOSE BLDC GLUCOMTR-MCNC: 255 MG/DL (ref 70–99)
GLUCOSE BLDC GLUCOMTR-MCNC: 255 MG/DL (ref 70–99)
GLUCOSE BLDC GLUCOMTR-MCNC: 281 MG/DL (ref 70–99)
GLUCOSE SERPL-MCNC: 202 MG/DL (ref 70–99)
HCO3 SERPL-SCNC: 24 MMOL/L (ref 22–29)
HCT VFR BLD AUTO: 37.5 % (ref 35–47)
HGB BLD-MCNC: 12.5 G/DL (ref 11.7–15.7)
MCH RBC QN AUTO: 29.7 PG (ref 26.5–33)
MCHC RBC AUTO-ENTMCNC: 33.3 G/DL (ref 31.5–36.5)
MCV RBC AUTO: 89.1 FL (ref 78–100)
PLATELET # BLD AUTO: 270 10E3/UL (ref 150–450)
POTASSIUM SERPL-SCNC: 4.1 MMOL/L (ref 3.4–5.3)
RBC # BLD AUTO: 4.21 10E6/UL (ref 3.8–5.2)
SODIUM SERPL-SCNC: 137 MMOL/L (ref 135–145)
WBC # BLD AUTO: 11.79 10E3/UL (ref 4–11)

## 2025-08-14 PROCEDURE — 999N000157 HC STATISTIC RCP TIME EA 10 MIN

## 2025-08-14 PROCEDURE — 94640 AIRWAY INHALATION TREATMENT: CPT | Mod: 76

## 2025-08-14 PROCEDURE — 97530 THERAPEUTIC ACTIVITIES: CPT | Mod: GP

## 2025-08-14 PROCEDURE — 71045 X-RAY EXAM CHEST 1 VIEW: CPT | Mod: 26 | Performed by: RADIOLOGY

## 2025-08-14 PROCEDURE — 85027 COMPLETE CBC AUTOMATED: CPT

## 2025-08-14 PROCEDURE — 97161 PT EVAL LOW COMPLEX 20 MIN: CPT | Mod: GP

## 2025-08-14 PROCEDURE — 36415 COLL VENOUS BLD VENIPUNCTURE: CPT

## 2025-08-14 PROCEDURE — 250N000011 HC RX IP 250 OP 636: Performed by: STUDENT IN AN ORGANIZED HEALTH CARE EDUCATION/TRAINING PROGRAM

## 2025-08-14 PROCEDURE — 80048 BASIC METABOLIC PNL TOTAL CA: CPT

## 2025-08-14 PROCEDURE — 97116 GAIT TRAINING THERAPY: CPT | Mod: GP

## 2025-08-14 PROCEDURE — 250N000013 HC RX MED GY IP 250 OP 250 PS 637: Performed by: STUDENT IN AN ORGANIZED HEALTH CARE EDUCATION/TRAINING PROGRAM

## 2025-08-14 PROCEDURE — 250N000013 HC RX MED GY IP 250 OP 250 PS 637

## 2025-08-14 PROCEDURE — 71045 X-RAY EXAM CHEST 1 VIEW: CPT

## 2025-08-14 PROCEDURE — 120N000002 HC R&B MED SURG/OB UMMC

## 2025-08-14 PROCEDURE — 999N000111 HC STATISTIC OT IP EVAL DEFER: Performed by: OCCUPATIONAL THERAPIST

## 2025-08-14 PROCEDURE — 94640 AIRWAY INHALATION TREATMENT: CPT

## 2025-08-14 PROCEDURE — 250N000009 HC RX 250: Performed by: STUDENT IN AN ORGANIZED HEALTH CARE EDUCATION/TRAINING PROGRAM

## 2025-08-14 RX ADMIN — ACETAMINOPHEN 1000 MG: 500 TABLET ORAL at 20:48

## 2025-08-14 RX ADMIN — FOLIC ACID 2 MG: 1 TABLET ORAL at 08:34

## 2025-08-14 RX ADMIN — LEVOTHYROXINE SODIUM 137 MCG: 0.11 TABLET ORAL at 08:33

## 2025-08-14 RX ADMIN — IPRATROPIUM BROMIDE 0.5 MG: 0.5 SOLUTION RESPIRATORY (INHALATION) at 08:58

## 2025-08-14 RX ADMIN — ASPIRIN 81 MG CHEWABLE TABLET 81 MG: 81 TABLET CHEWABLE at 22:01

## 2025-08-14 RX ADMIN — HYDROMORPHONE HYDROCHLORIDE 2 MG: 2 TABLET ORAL at 07:00

## 2025-08-14 RX ADMIN — ALBUTEROL SULFATE 2.5 MG: 2.5 SOLUTION RESPIRATORY (INHALATION) at 20:37

## 2025-08-14 RX ADMIN — EZETIMIBE 10 MG: 10 TABLET ORAL at 08:32

## 2025-08-14 RX ADMIN — IPRATROPIUM BROMIDE 0.5 MG: 0.5 SOLUTION RESPIRATORY (INHALATION) at 20:37

## 2025-08-14 RX ADMIN — INSULIN ASPART 2 UNITS: 100 INJECTION, SOLUTION INTRAVENOUS; SUBCUTANEOUS at 12:20

## 2025-08-14 RX ADMIN — IBUPROFEN 800 MG: 600 TABLET, FILM COATED ORAL at 08:39

## 2025-08-14 RX ADMIN — SENNOSIDES, DOCUSATE SODIUM 1 TABLET: 50; 8.6 TABLET, FILM COATED ORAL at 08:33

## 2025-08-14 RX ADMIN — ACETAMINOPHEN 1000 MG: 500 TABLET ORAL at 08:33

## 2025-08-14 RX ADMIN — INSULIN ASPART 2 UNITS: 100 INJECTION, SOLUTION INTRAVENOUS; SUBCUTANEOUS at 08:29

## 2025-08-14 RX ADMIN — ESZOPICLONE 3 MG: 1 TABLET, FILM COATED ORAL at 22:02

## 2025-08-14 RX ADMIN — ENOXAPARIN SODIUM 40 MG: 40 INJECTION SUBCUTANEOUS at 12:52

## 2025-08-14 RX ADMIN — ALBUTEROL SULFATE 2.5 MG: 2.5 SOLUTION RESPIRATORY (INHALATION) at 11:50

## 2025-08-14 RX ADMIN — IBUPROFEN 800 MG: 600 TABLET, FILM COATED ORAL at 20:47

## 2025-08-14 RX ADMIN — HYDROMORPHONE HYDROCHLORIDE 2 MG: 2 TABLET ORAL at 17:30

## 2025-08-14 RX ADMIN — ACETAMINOPHEN 1000 MG: 500 TABLET ORAL at 13:17

## 2025-08-14 RX ADMIN — ATENOLOL 50 MG: 50 TABLET ORAL at 08:33

## 2025-08-14 RX ADMIN — IBUPROFEN 800 MG: 600 TABLET, FILM COATED ORAL at 13:17

## 2025-08-14 RX ADMIN — SENNOSIDES, DOCUSATE SODIUM 1 TABLET: 50; 8.6 TABLET, FILM COATED ORAL at 20:47

## 2025-08-14 RX ADMIN — DULOXETINE HYDROCHLORIDE 90 MG: 60 CAPSULE, DELAYED RELEASE PELLETS ORAL at 08:34

## 2025-08-14 RX ADMIN — INSULIN ASPART 3 UNITS: 100 INJECTION, SOLUTION INTRAVENOUS; SUBCUTANEOUS at 17:14

## 2025-08-14 RX ADMIN — ALBUTEROL SULFATE 2.5 MG: 2.5 SOLUTION RESPIRATORY (INHALATION) at 08:58

## 2025-08-14 RX ADMIN — IPRATROPIUM BROMIDE 0.5 MG: 0.5 SOLUTION RESPIRATORY (INHALATION) at 11:50

## 2025-08-14 ASSESSMENT — ACTIVITIES OF DAILY LIVING (ADL)
ADLS_ACUITY_SCORE: 24
ADLS_ACUITY_SCORE: 21
ADLS_ACUITY_SCORE: 21
ADLS_ACUITY_SCORE: 22
ADLS_ACUITY_SCORE: 22
ADLS_ACUITY_SCORE: 21
ADLS_ACUITY_SCORE: 22
ADLS_ACUITY_SCORE: 24
ADLS_ACUITY_SCORE: 21
ADLS_ACUITY_SCORE: 24
ADLS_ACUITY_SCORE: 24
ADLS_ACUITY_SCORE: 21
ADLS_ACUITY_SCORE: 24

## 2025-08-15 ENCOUNTER — APPOINTMENT (OUTPATIENT)
Dept: GENERAL RADIOLOGY | Facility: CLINIC | Age: 55
DRG: 164 | End: 2025-08-15
Attending: STUDENT IN AN ORGANIZED HEALTH CARE EDUCATION/TRAINING PROGRAM
Payer: COMMERCIAL

## 2025-08-15 VITALS
DIASTOLIC BLOOD PRESSURE: 59 MMHG | HEIGHT: 66 IN | RESPIRATION RATE: 18 BRPM | SYSTOLIC BLOOD PRESSURE: 114 MMHG | BODY MASS INDEX: 40.32 KG/M2 | TEMPERATURE: 98.7 F | OXYGEN SATURATION: 94 % | HEART RATE: 99 BPM | WEIGHT: 250.9 LBS

## 2025-08-15 LAB
ERYTHROCYTE [DISTWIDTH] IN BLOOD BY AUTOMATED COUNT: 14 % (ref 10–15)
GLUCOSE BLDC GLUCOMTR-MCNC: 231 MG/DL (ref 70–99)
GLUCOSE BLDC GLUCOMTR-MCNC: 234 MG/DL (ref 70–99)
GLUCOSE BLDC GLUCOMTR-MCNC: 243 MG/DL (ref 70–99)
GLUCOSE BLDC GLUCOMTR-MCNC: 329 MG/DL (ref 70–99)
HCT VFR BLD AUTO: 37.7 % (ref 35–47)
HGB BLD-MCNC: 12.3 G/DL (ref 11.7–15.7)
HOLD SPECIMEN: NORMAL
MCH RBC QN AUTO: 29.4 PG (ref 26.5–33)
MCHC RBC AUTO-ENTMCNC: 32.6 G/DL (ref 31.5–36.5)
MCV RBC AUTO: 90 FL (ref 78–100)
PLATELET # BLD AUTO: 255 10E3/UL (ref 150–450)
RBC # BLD AUTO: 4.19 10E6/UL (ref 3.8–5.2)
WBC # BLD AUTO: 11.53 10E3/UL (ref 4–11)

## 2025-08-15 PROCEDURE — 250N000011 HC RX IP 250 OP 636: Performed by: STUDENT IN AN ORGANIZED HEALTH CARE EDUCATION/TRAINING PROGRAM

## 2025-08-15 PROCEDURE — 94640 AIRWAY INHALATION TREATMENT: CPT | Mod: 76

## 2025-08-15 PROCEDURE — 94640 AIRWAY INHALATION TREATMENT: CPT

## 2025-08-15 PROCEDURE — 71045 X-RAY EXAM CHEST 1 VIEW: CPT

## 2025-08-15 PROCEDURE — 250N000009 HC RX 250: Performed by: STUDENT IN AN ORGANIZED HEALTH CARE EDUCATION/TRAINING PROGRAM

## 2025-08-15 PROCEDURE — 85027 COMPLETE CBC AUTOMATED: CPT

## 2025-08-15 PROCEDURE — 250N000013 HC RX MED GY IP 250 OP 250 PS 637: Performed by: STUDENT IN AN ORGANIZED HEALTH CARE EDUCATION/TRAINING PROGRAM

## 2025-08-15 PROCEDURE — 8E0W4CZ ROBOTIC ASSISTED PROCEDURE OF TRUNK REGION, PERCUTANEOUS ENDOSCOPIC APPROACH: ICD-10-PCS | Performed by: STUDENT IN AN ORGANIZED HEALTH CARE EDUCATION/TRAINING PROGRAM

## 2025-08-15 PROCEDURE — 36415 COLL VENOUS BLD VENIPUNCTURE: CPT

## 2025-08-15 PROCEDURE — 71045 X-RAY EXAM CHEST 1 VIEW: CPT | Mod: 26 | Performed by: RADIOLOGY

## 2025-08-15 PROCEDURE — 0BBF4ZZ EXCISION OF RIGHT LOWER LUNG LOBE, PERCUTANEOUS ENDOSCOPIC APPROACH: ICD-10-PCS | Performed by: STUDENT IN AN ORGANIZED HEALTH CARE EDUCATION/TRAINING PROGRAM

## 2025-08-15 PROCEDURE — 250N000013 HC RX MED GY IP 250 OP 250 PS 637: Performed by: PHYSICIAN ASSISTANT

## 2025-08-15 PROCEDURE — 07B74ZZ EXCISION OF THORAX LYMPHATIC, PERCUTANEOUS ENDOSCOPIC APPROACH: ICD-10-PCS | Performed by: STUDENT IN AN ORGANIZED HEALTH CARE EDUCATION/TRAINING PROGRAM

## 2025-08-15 PROCEDURE — 999N000128 HC STATISTIC PERIPHERAL IV START W/O US GUIDANCE

## 2025-08-15 PROCEDURE — 999N000157 HC STATISTIC RCP TIME EA 10 MIN

## 2025-08-15 RX ORDER — ENOXAPARIN SODIUM 100 MG/ML
40 INJECTION SUBCUTANEOUS EVERY 12 HOURS
Qty: 4.8 ML | Refills: 0 | Status: ACTIVE | OUTPATIENT
Start: 2025-08-15 | End: 2025-08-21

## 2025-08-15 RX ORDER — POLYETHYLENE GLYCOL 3350 17 G/17G
17 POWDER, FOR SOLUTION ORAL DAILY
Qty: 510 G | Refills: 0 | Status: SHIPPED | OUTPATIENT
Start: 2025-08-15

## 2025-08-15 RX ORDER — IPRATROPIUM BROMIDE AND ALBUTEROL SULFATE 2.5; .5 MG/3ML; MG/3ML
3 SOLUTION RESPIRATORY (INHALATION) EVERY 4 HOURS PRN
Status: DISCONTINUED | OUTPATIENT
Start: 2025-08-15 | End: 2025-08-15 | Stop reason: HOSPADM

## 2025-08-15 RX ORDER — FUROSEMIDE 20 MG/1
20 TABLET ORAL DAILY
Qty: 3 TABLET | Refills: 0 | Status: SHIPPED | OUTPATIENT
Start: 2025-08-15 | End: 2025-08-18

## 2025-08-15 RX ORDER — ACETAMINOPHEN 500 MG
1000 TABLET ORAL EVERY 6 HOURS
COMMUNITY
Start: 2025-08-15

## 2025-08-15 RX ORDER — IBUPROFEN 800 MG/1
800 TABLET, FILM COATED ORAL 3 TIMES DAILY
COMMUNITY
Start: 2025-08-15

## 2025-08-15 RX ORDER — FUROSEMIDE 10 MG/ML
10 INJECTION INTRAMUSCULAR; INTRAVENOUS ONCE
Status: DISCONTINUED | OUTPATIENT
Start: 2025-08-15 | End: 2025-08-15

## 2025-08-15 RX ORDER — AMOXICILLIN 250 MG
1 CAPSULE ORAL 2 TIMES DAILY
Qty: 14 TABLET | Refills: 0 | Status: SHIPPED | OUTPATIENT
Start: 2025-08-15

## 2025-08-15 RX ORDER — IPRATROPIUM BROMIDE AND ALBUTEROL SULFATE 2.5; .5 MG/3ML; MG/3ML
3 SOLUTION RESPIRATORY (INHALATION)
Status: DISCONTINUED | OUTPATIENT
Start: 2025-08-15 | End: 2025-08-15 | Stop reason: HOSPADM

## 2025-08-15 RX ORDER — HYDROMORPHONE HYDROCHLORIDE 2 MG/1
2 TABLET ORAL EVERY 4 HOURS PRN
Qty: 40 TABLET | Refills: 0 | Status: SHIPPED | OUTPATIENT
Start: 2025-08-15

## 2025-08-15 RX ADMIN — FOLIC ACID 2 MG: 1 TABLET ORAL at 08:18

## 2025-08-15 RX ADMIN — HYDROMORPHONE HYDROCHLORIDE 2 MG: 2 TABLET ORAL at 07:10

## 2025-08-15 RX ADMIN — ACETAMINOPHEN 1000 MG: 500 TABLET ORAL at 08:18

## 2025-08-15 RX ADMIN — IPRATROPIUM BROMIDE 0.5 MG: 0.5 SOLUTION RESPIRATORY (INHALATION) at 12:57

## 2025-08-15 RX ADMIN — ALBUTEROL SULFATE 2.5 MG: 2.5 SOLUTION RESPIRATORY (INHALATION) at 09:51

## 2025-08-15 RX ADMIN — INSULIN ASPART 4 UNITS: 100 INJECTION, SOLUTION INTRAVENOUS; SUBCUTANEOUS at 12:38

## 2025-08-15 RX ADMIN — IPRATROPIUM BROMIDE 0.5 MG: 0.5 SOLUTION RESPIRATORY (INHALATION) at 09:54

## 2025-08-15 RX ADMIN — ACETAMINOPHEN 1000 MG: 500 TABLET ORAL at 14:31

## 2025-08-15 RX ADMIN — DULOXETINE HYDROCHLORIDE 90 MG: 60 CAPSULE, DELAYED RELEASE PELLETS ORAL at 08:18

## 2025-08-15 RX ADMIN — IBUPROFEN 800 MG: 600 TABLET, FILM COATED ORAL at 14:32

## 2025-08-15 RX ADMIN — METFORMIN HYDROCHLORIDE 1000 MG: 500 TABLET ORAL at 18:01

## 2025-08-15 RX ADMIN — HYDROMORPHONE HYDROCHLORIDE 2 MG: 2 TABLET ORAL at 12:47

## 2025-08-15 RX ADMIN — ALBUTEROL SULFATE 2.5 MG: 2.5 SOLUTION RESPIRATORY (INHALATION) at 12:57

## 2025-08-15 RX ADMIN — EZETIMIBE 10 MG: 10 TABLET ORAL at 08:18

## 2025-08-15 RX ADMIN — ENOXAPARIN SODIUM 40 MG: 40 INJECTION SUBCUTANEOUS at 12:38

## 2025-08-15 RX ADMIN — SENNOSIDES, DOCUSATE SODIUM 1 TABLET: 50; 8.6 TABLET, FILM COATED ORAL at 08:18

## 2025-08-15 RX ADMIN — ATENOLOL 50 MG: 50 TABLET ORAL at 08:18

## 2025-08-15 RX ADMIN — INSULIN ASPART 2 UNITS: 100 INJECTION, SOLUTION INTRAVENOUS; SUBCUTANEOUS at 18:01

## 2025-08-15 RX ADMIN — LEVOTHYROXINE SODIUM 137 MCG: 0.11 TABLET ORAL at 08:18

## 2025-08-15 RX ADMIN — IBUPROFEN 800 MG: 600 TABLET, FILM COATED ORAL at 08:18

## 2025-08-15 RX ADMIN — INSULIN ASPART 3 UNITS: 100 INJECTION, SOLUTION INTRAVENOUS; SUBCUTANEOUS at 08:19

## 2025-08-15 ASSESSMENT — ACTIVITIES OF DAILY LIVING (ADL)
ADLS_ACUITY_SCORE: 23
ADLS_ACUITY_SCORE: 24
ADLS_ACUITY_SCORE: 23
ADLS_ACUITY_SCORE: 24
ADLS_ACUITY_SCORE: 23

## 2025-08-21 LAB
PATH REPORT.COMMENTS IMP SPEC: ABNORMAL
PATH REPORT.COMMENTS IMP SPEC: ABNORMAL
PATH REPORT.COMMENTS IMP SPEC: YES
PATH REPORT.FINAL DX SPEC: ABNORMAL
PATH REPORT.GROSS SPEC: ABNORMAL
PATH REPORT.INTRAOP OBS SPEC DOC: ABNORMAL
PATH REPORT.MICROSCOPIC SPEC OTHER STN: ABNORMAL
PATH REPORT.RELEVANT HX SPEC: ABNORMAL
PATHOLOGY SYNOPTIC REPORT: ABNORMAL
PHOTO IMAGE: ABNORMAL

## 2025-08-26 ENCOUNTER — TEAM CONFERENCE (OUTPATIENT)
Dept: SURGERY | Facility: CLINIC | Age: 55
End: 2025-08-26

## 2025-08-26 ENCOUNTER — TUMOR CONFERENCE (OUTPATIENT)
Dept: ONCOLOGY | Facility: CLINIC | Age: 55
End: 2025-08-26
Payer: COMMERCIAL

## 2025-08-26 DIAGNOSIS — R91.1 LUNG NODULE: Primary | ICD-10-CM

## 2025-08-26 DIAGNOSIS — D3A.090 CARCINOID TUMOR OF RIGHT LUNG (H): Primary | ICD-10-CM

## (undated) DEVICE — PREP CHLORAPREP 26ML TINTED HI-LITE ORANGE 930815

## (undated) DEVICE — LUBRICANT INST KIT ENDO-LUBE 220-90

## (undated) DEVICE — KIT ENDO FIRST STEP DISINFECTANT 200ML W/POUCH EP-4

## (undated) DEVICE — SU VICRYL+ 0 27 UR6 VLT VCP603H

## (undated) DEVICE — DRAPE MAYO STAND 23X54 8337

## (undated) DEVICE — DRAPE IOBAN INCISE 10X20CM 6635

## (undated) DEVICE — TUBING FILTER TRI-LUMEN AIRSEAL ASC-EVAC1

## (undated) DEVICE — GLOVE PROTEXIS MICRO 6.0 LT BLUE 2D73PM60

## (undated) DEVICE — SOL WATER IRRIG 1000ML BOTTLE 2F7114

## (undated) DEVICE — SYR 30ML LL W/O NDL 302832

## (undated) DEVICE — LINEN TOWEL PACK X6 WHITE 5487

## (undated) DEVICE — SU SILK 0 SH 30" K834H

## (undated) DEVICE — DRSG TEGADERM 4X4 3/4" 1626W

## (undated) DEVICE — DRAIN CHEST TUBE 28FR STR 8028

## (undated) DEVICE — DAVINCI XI DRAPE COLUMN 470341

## (undated) DEVICE — DAVINCI XI DRAPE ARM 470015

## (undated) DEVICE — DRSG MEPILEX BORDER SACRUM 6.3X7.9" 282055

## (undated) DEVICE — Device

## (undated) DEVICE — DAVINCI XI GRASPER FENESTRATED TIP UP 8MM 470347

## (undated) DEVICE — ADH LIQUID MASTISOL TOPICAL VIAL 2-3ML 0523-48

## (undated) DEVICE — ATTACHMENT DEVICE DRAIN/TUBE 9781

## (undated) DEVICE — SOL NACL 0.9% IRRIG 1000ML BOTTLE 2F7124

## (undated) DEVICE — SURGICEL ABSORBABLE HEMOSTAT SNOW 4"X4" 2083

## (undated) DEVICE — LINEN TOWEL PACK X5 5464

## (undated) DEVICE — ENDO DISSECTOR KITTNER CIGARETTE ROLL4"X4" 15505/25

## (undated) DEVICE — STPL DAVINCI SUREFORM 45MM STR TIPRELOAD 12FIRE 480445

## (undated) DEVICE — DAVINCI XI FCP CADIERE 8MM ENDOWRIST 471049

## (undated) DEVICE — ESU GROUND PAD ADULT REM W/15' CORD E7507DB

## (undated) DEVICE — LUBRICANT INST ELECTROLUBE EL101

## (undated) DEVICE — STPL ENDO HANDLE GIA ULTRA UNIVERSAL STD EGIAUSTND

## (undated) DEVICE — POUCH TISSUE RETRIEVAL ROBOTIC 12MM 5.5" INTRO TRS-ROBO-12

## (undated) DEVICE — DRSG TELFA 3X8" 1238

## (undated) DEVICE — SU SILK 0 TIE 6X30" A306H

## (undated) DEVICE — SYR 30ML SLIP TIP W/O NDL 302833

## (undated) DEVICE — ENDO TROCAR CONMED AIRSEAL BLADELESS 12X120MM IAS12-120LP

## (undated) DEVICE — DAVINCI XI REDUCER 8-12MM 470381

## (undated) DEVICE — TIES BANDING T50R

## (undated) DEVICE — DRSG PRIMAPORE 02X3" 7133

## (undated) DEVICE — SYR 10ML LL W/O NDL 302995

## (undated) DEVICE — SUCTION DRY CHEST DRAIN OASIS 3600-100

## (undated) DEVICE — ESU ELEC BLADE 2.75" COATED/INSULATED E1455

## (undated) DEVICE — DRAPE IOBAN INCISE 23X17" 6650EZ

## (undated) DEVICE — SU VICRYL 2-0 SH 27" UND J417H

## (undated) DEVICE — TUBING SUCTION MEDI-VAC SOFT 3/16"X20' N520A

## (undated) DEVICE — SUCTION MANIFOLD NEPTUNE 2 SYS 4 PORT 0702-020-000

## (undated) DEVICE — STPL DAVINCI SUREFORM 45MM RELOAD BLUE 48345B

## (undated) DEVICE — ESU PENCIL W/ROCKER SWITCH BLADE HOLSTER E2350HDB

## (undated) DEVICE — DAVINCI XI ESU BIPOLAR LONG 78DEG ANG ENDOWRIST EXT 471400

## (undated) DEVICE — DRSG STERI STRIP 1/2X4" R1547

## (undated) DEVICE — DAVINCI XI SEAL UNIVERSAL 5-12MM 470500

## (undated) DEVICE — DRSG MEPILEX BORDER 6"X6" 595600

## (undated) DEVICE — ANTIFOG SOLUTION SEE SHARP 150M TROCAR SWABS 30978 (COI)

## (undated) RX ORDER — FENTANYL CITRATE 50 UG/ML
INJECTION, SOLUTION INTRAMUSCULAR; INTRAVENOUS
Status: DISPENSED
Start: 2025-08-13

## (undated) RX ORDER — HYDROMORPHONE HYDROCHLORIDE 1 MG/ML
INJECTION, SOLUTION INTRAMUSCULAR; INTRAVENOUS; SUBCUTANEOUS
Status: DISPENSED
Start: 2025-08-13

## (undated) RX ORDER — ACETAMINOPHEN 325 MG/1
TABLET ORAL
Status: DISPENSED
Start: 2025-08-13

## (undated) RX ORDER — ENOXAPARIN SODIUM 100 MG/ML
INJECTION SUBCUTANEOUS
Status: DISPENSED
Start: 2025-08-13

## (undated) RX ORDER — ALBUTEROL SULFATE 90 UG/1
INHALANT RESPIRATORY (INHALATION)
Status: DISPENSED
Start: 2025-08-13

## (undated) RX ORDER — CEFAZOLIN SODIUM/WATER 2 G/20 ML
SYRINGE (ML) INTRAVENOUS
Status: DISPENSED
Start: 2025-08-13